# Patient Record
Sex: FEMALE | Race: WHITE | NOT HISPANIC OR LATINO | Employment: UNEMPLOYED | URBAN - NONMETROPOLITAN AREA
[De-identification: names, ages, dates, MRNs, and addresses within clinical notes are randomized per-mention and may not be internally consistent; named-entity substitution may affect disease eponyms.]

---
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
## 2019-07-19 ENCOUNTER — APPOINTMENT (OUTPATIENT)
Dept: CT IMAGING | Facility: HOSPITAL | Age: 19
End: 2019-07-19

## 2019-07-19 ENCOUNTER — HOSPITAL ENCOUNTER (EMERGENCY)
Facility: HOSPITAL | Age: 19
Discharge: HOME | End: 2019-07-19
Admitting: EMERGENCY MEDICINE

## 2019-07-19 VITALS
HEART RATE: 88 BPM | BODY MASS INDEX: 29.44 KG/M2 | OXYGEN SATURATION: 98 % | RESPIRATION RATE: 16 BRPM | SYSTOLIC BLOOD PRESSURE: 106 MMHG | WEIGHT: 160 LBS | DIASTOLIC BLOOD PRESSURE: 64 MMHG | HEIGHT: 62 IN | TEMPERATURE: 99 F

## 2019-07-19 DIAGNOSIS — R10.30 LOWER ABDOMINAL PAIN: Primary | ICD-10-CM

## 2019-07-19 DIAGNOSIS — N30.00 ACUTE CYSTITIS WITHOUT HEMATURIA: ICD-10-CM

## 2019-07-19 LAB
ALBUMIN SERPL-MCNC: 4 G/DL (ref 3.5–5)
ALBUMIN/GLOB SERPL: 1.3 G/DL (ref 1.1–2.5)
ALP SERPL-CCNC: 111 U/L (ref 24–120)
ALT SERPL W P-5'-P-CCNC: 26 U/L (ref 0–54)
ANION GAP SERPL CALCULATED.3IONS-SCNC: 6 MMOL/L (ref 4–13)
AST SERPL-CCNC: 27 U/L (ref 7–45)
B-HCG UR QL: NEGATIVE
BACTERIA UR QL AUTO: ABNORMAL /HPF
BASOPHILS # BLD AUTO: 0.04 10*3/MM3 (ref 0–0.2)
BASOPHILS NFR BLD AUTO: 0.4 % (ref 0–2)
BILIRUB SERPL-MCNC: 0.2 MG/DL (ref 0.1–1)
BILIRUB UR QL STRIP: NEGATIVE
BUN BLD-MCNC: 12 MG/DL (ref 5–21)
BUN/CREAT SERPL: 16.4 (ref 7–25)
CALCIUM SPEC-SCNC: 9.4 MG/DL (ref 8.4–10.4)
CHLORIDE SERPL-SCNC: 106 MMOL/L (ref 98–110)
CLARITY UR: ABNORMAL
CO2 SERPL-SCNC: 27 MMOL/L (ref 24–31)
COLOR UR: YELLOW
CREAT BLD-MCNC: 0.73 MG/DL (ref 0.5–1.4)
DEPRECATED RDW RBC AUTO: 43.4 FL (ref 40–54)
EOSINOPHIL # BLD AUTO: 0.04 10*3/MM3 (ref 0–0.7)
EOSINOPHIL NFR BLD AUTO: 0.4 % (ref 0–4)
ERYTHROCYTE [DISTWIDTH] IN BLOOD BY AUTOMATED COUNT: 14 % (ref 12–15)
GFR SERPL CREATININE-BSD FRML MDRD: 102 ML/MIN/1.73
GLOBULIN UR ELPH-MCNC: 3.1 GM/DL
GLUCOSE BLD-MCNC: 97 MG/DL (ref 70–100)
GLUCOSE UR STRIP-MCNC: ABNORMAL MG/DL
HCT VFR BLD AUTO: 38.6 % (ref 37–47)
HGB BLD-MCNC: 12.5 G/DL (ref 12–16)
HGB UR QL STRIP.AUTO: NEGATIVE
HYALINE CASTS UR QL AUTO: ABNORMAL /LPF
IMM GRANULOCYTES # BLD AUTO: 0.03 10*3/MM3 (ref 0–0.05)
IMM GRANULOCYTES NFR BLD AUTO: 0.3 % (ref 0–5)
INTERNAL NEGATIVE CONTROL: NEGATIVE
INTERNAL POSITIVE CONTROL: POSITIVE
KETONES UR QL STRIP: NEGATIVE
LEUKOCYTE ESTERASE UR QL STRIP.AUTO: ABNORMAL
LYMPHOCYTES # BLD AUTO: 2.73 10*3/MM3 (ref 0.72–4.86)
LYMPHOCYTES NFR BLD AUTO: 24.4 % (ref 15–45)
Lab: NORMAL
MCH RBC QN AUTO: 27.4 PG (ref 28–32)
MCHC RBC AUTO-ENTMCNC: 32.4 G/DL (ref 33–36)
MCV RBC AUTO: 84.6 FL (ref 82–98)
MONOCYTES # BLD AUTO: 0.59 10*3/MM3 (ref 0.19–1.3)
MONOCYTES NFR BLD AUTO: 5.3 % (ref 4–12)
NEUTROPHILS # BLD AUTO: 7.74 10*3/MM3 (ref 1.87–8.4)
NEUTROPHILS NFR BLD AUTO: 69.2 % (ref 39–78)
NITRITE UR QL STRIP: NEGATIVE
NRBC BLD AUTO-RTO: 0 /100 WBC (ref 0–0.2)
PH UR STRIP.AUTO: <=5 [PH] (ref 5–8)
PLATELET # BLD AUTO: 307 10*3/MM3 (ref 130–400)
PMV BLD AUTO: 11.6 FL (ref 6–12)
POTASSIUM BLD-SCNC: 4.3 MMOL/L (ref 3.5–5.3)
PROT SERPL-MCNC: 7.1 G/DL (ref 6.3–8.7)
PROT UR QL STRIP: NEGATIVE
RBC # BLD AUTO: 4.56 10*6/MM3 (ref 4.2–5.4)
RBC # UR: ABNORMAL /HPF
REF LAB TEST METHOD: ABNORMAL
SODIUM BLD-SCNC: 139 MMOL/L (ref 135–145)
SP GR UR STRIP: >1.03 (ref 1–1.03)
SQUAMOUS #/AREA URNS HPF: ABNORMAL /HPF
UROBILINOGEN UR QL STRIP: ABNORMAL
WBC NRBC COR # BLD: 11.17 10*3/MM3 (ref 4.8–10.8)
WBC UR QL AUTO: ABNORMAL /HPF

## 2019-07-19 PROCEDURE — 85025 COMPLETE CBC W/AUTO DIFF WBC: CPT | Performed by: NURSE PRACTITIONER

## 2019-07-19 PROCEDURE — 80053 COMPREHEN METABOLIC PANEL: CPT | Performed by: NURSE PRACTITIONER

## 2019-07-19 PROCEDURE — 81001 URINALYSIS AUTO W/SCOPE: CPT | Performed by: NURSE PRACTITIONER

## 2019-07-19 PROCEDURE — 81025 URINE PREGNANCY TEST: CPT | Performed by: NURSE PRACTITIONER

## 2019-07-19 PROCEDURE — 74177 CT ABD & PELVIS W/CONTRAST: CPT

## 2019-07-19 PROCEDURE — 25010000002 ONDANSETRON PER 1 MG: Performed by: NURSE PRACTITIONER

## 2019-07-19 PROCEDURE — 87086 URINE CULTURE/COLONY COUNT: CPT | Performed by: NURSE PRACTITIONER

## 2019-07-19 PROCEDURE — 99283 EMERGENCY DEPT VISIT LOW MDM: CPT

## 2019-07-19 PROCEDURE — 96374 THER/PROPH/DIAG INJ IV PUSH: CPT

## 2019-07-19 PROCEDURE — 25010000002 IOPAMIDOL 61 % SOLUTION: Performed by: NURSE PRACTITIONER

## 2019-07-19 RX ORDER — NITROFURANTOIN 25; 75 MG/1; MG/1
100 CAPSULE ORAL 2 TIMES DAILY
Qty: 10 CAPSULE | Refills: 0 | Status: SHIPPED | OUTPATIENT
Start: 2019-07-19 | End: 2019-07-24

## 2019-07-19 RX ORDER — ONDANSETRON 2 MG/ML
4 INJECTION INTRAMUSCULAR; INTRAVENOUS ONCE
Status: COMPLETED | OUTPATIENT
Start: 2019-07-19 | End: 2019-07-19

## 2019-07-19 RX ADMIN — IOPAMIDOL 92 ML: 612 INJECTION, SOLUTION INTRAVENOUS at 19:05

## 2019-07-19 RX ADMIN — ONDANSETRON HYDROCHLORIDE 4 MG: 2 SOLUTION INTRAMUSCULAR; INTRAVENOUS at 17:40

## 2019-07-19 RX ADMIN — SODIUM CHLORIDE 1000 ML: 9 INJECTION, SOLUTION INTRAVENOUS at 17:15

## 2019-07-21 LAB — BACTERIA SPEC AEROBE CULT: ABNORMAL

## 2020-08-30 ENCOUNTER — APPOINTMENT (OUTPATIENT)
Dept: CT IMAGING | Facility: HOSPITAL | Age: 20
End: 2020-08-30

## 2020-08-30 ENCOUNTER — HOSPITAL ENCOUNTER (EMERGENCY)
Facility: HOSPITAL | Age: 20
Discharge: HOME | End: 2020-08-30
Admitting: EMERGENCY MEDICINE

## 2020-08-30 VITALS
RESPIRATION RATE: 15 BRPM | DIASTOLIC BLOOD PRESSURE: 66 MMHG | WEIGHT: 172 LBS | HEIGHT: 64 IN | OXYGEN SATURATION: 97 % | TEMPERATURE: 98.9 F | HEART RATE: 95 BPM | SYSTOLIC BLOOD PRESSURE: 109 MMHG | BODY MASS INDEX: 29.37 KG/M2

## 2020-08-30 DIAGNOSIS — J02.9 PHARYNGITIS, UNSPECIFIED ETIOLOGY: Primary | ICD-10-CM

## 2020-08-30 LAB
ALBUMIN SERPL-MCNC: 4 G/DL (ref 3.5–5.2)
ALBUMIN/GLOB SERPL: 1.2 G/DL
ALP SERPL-CCNC: 89 U/L (ref 39–117)
ALT SERPL W P-5'-P-CCNC: 18 U/L (ref 1–33)
ANION GAP SERPL CALCULATED.3IONS-SCNC: 13 MMOL/L (ref 5–15)
AST SERPL-CCNC: 15 U/L (ref 1–32)
B-HCG UR QL: NEGATIVE
BACTERIA UR QL AUTO: ABNORMAL /HPF
BASOPHILS # BLD AUTO: 0.03 10*3/MM3 (ref 0–0.2)
BASOPHILS NFR BLD AUTO: 0.3 % (ref 0–1.5)
BILIRUB SERPL-MCNC: 0.2 MG/DL (ref 0–1.2)
BILIRUB UR QL STRIP: NEGATIVE
BUN SERPL-MCNC: 11 MG/DL (ref 6–20)
BUN/CREAT SERPL: 16.4 (ref 7–25)
CALCIUM SPEC-SCNC: 9.3 MG/DL (ref 8.6–10.5)
CHLORIDE SERPL-SCNC: 101 MMOL/L (ref 98–107)
CLARITY UR: CLEAR
CO2 SERPL-SCNC: 23 MMOL/L (ref 22–29)
COLOR UR: YELLOW
CREAT SERPL-MCNC: 0.67 MG/DL (ref 0.57–1)
D-LACTATE SERPL-SCNC: 1.1 MMOL/L (ref 0.5–2)
DEPRECATED RDW RBC AUTO: 43.9 FL (ref 37–54)
EOSINOPHIL # BLD AUTO: 0 10*3/MM3 (ref 0–0.4)
EOSINOPHIL NFR BLD AUTO: 0 % (ref 0.3–6.2)
ERYTHROCYTE [DISTWIDTH] IN BLOOD BY AUTOMATED COUNT: 14.2 % (ref 12.3–15.4)
GFR SERPL CREATININE-BSD FRML MDRD: 111 ML/MIN/1.73
GLOBULIN UR ELPH-MCNC: 3.3 GM/DL
GLUCOSE SERPL-MCNC: 96 MG/DL (ref 65–99)
GLUCOSE UR STRIP-MCNC: NEGATIVE MG/DL
HCT VFR BLD AUTO: 40.6 % (ref 34–46.6)
HGB BLD-MCNC: 13.1 G/DL (ref 12–15.9)
HGB UR QL STRIP.AUTO: NEGATIVE
HOLD SPECIMEN: NORMAL
IMM GRANULOCYTES # BLD AUTO: 0.02 10*3/MM3 (ref 0–0.05)
IMM GRANULOCYTES NFR BLD AUTO: 0.2 % (ref 0–0.5)
INTERNAL NEGATIVE CONTROL: NEGATIVE
INTERNAL POSITIVE CONTROL: POSITIVE
KETONES UR QL STRIP: NEGATIVE
LEUKOCYTE ESTERASE UR QL STRIP.AUTO: ABNORMAL
LYMPHOCYTES # BLD AUTO: 1.73 10*3/MM3 (ref 0.7–3.1)
LYMPHOCYTES NFR BLD AUTO: 19.6 % (ref 19.6–45.3)
Lab: NORMAL
MCH RBC QN AUTO: 27.3 PG (ref 26.6–33)
MCHC RBC AUTO-ENTMCNC: 32.3 G/DL (ref 31.5–35.7)
MCV RBC AUTO: 84.6 FL (ref 79–97)
MONOCYTES # BLD AUTO: 0.53 10*3/MM3 (ref 0.1–0.9)
MONOCYTES NFR BLD AUTO: 6 % (ref 5–12)
NEUTROPHILS NFR BLD AUTO: 6.51 10*3/MM3 (ref 1.7–7)
NEUTROPHILS NFR BLD AUTO: 73.9 % (ref 42.7–76)
NITRITE UR QL STRIP: NEGATIVE
NRBC BLD AUTO-RTO: 0 /100 WBC (ref 0–0.2)
PH UR STRIP.AUTO: 6.5 [PH] (ref 5–8)
PLATELET # BLD AUTO: 298 10*3/MM3 (ref 140–450)
PMV BLD AUTO: 11.5 FL (ref 6–12)
POTASSIUM SERPL-SCNC: 4.4 MMOL/L (ref 3.5–5.2)
PROT SERPL-MCNC: 7.3 G/DL (ref 6–8.5)
PROT UR QL STRIP: NEGATIVE
RBC # BLD AUTO: 4.8 10*6/MM3 (ref 3.77–5.28)
RBC # UR: ABNORMAL /HPF
REF LAB TEST METHOD: ABNORMAL
S PYO AG THROAT QL: NEGATIVE
SODIUM SERPL-SCNC: 137 MMOL/L (ref 136–145)
SP GR UR STRIP: 1.02 (ref 1–1.03)
SQUAMOUS #/AREA URNS HPF: ABNORMAL /HPF
UROBILINOGEN UR QL STRIP: ABNORMAL
WBC # BLD AUTO: 8.82 10*3/MM3 (ref 3.4–10.8)
WBC UR QL AUTO: ABNORMAL /HPF
YEAST URNS QL MICRO: ABNORMAL /HPF

## 2020-08-30 PROCEDURE — 81001 URINALYSIS AUTO W/SCOPE: CPT | Performed by: NURSE PRACTITIONER

## 2020-08-30 PROCEDURE — 99284 EMERGENCY DEPT VISIT MOD MDM: CPT

## 2020-08-30 PROCEDURE — 87081 CULTURE SCREEN ONLY: CPT | Performed by: NURSE PRACTITIONER

## 2020-08-30 PROCEDURE — 87040 BLOOD CULTURE FOR BACTERIA: CPT | Performed by: NURSE PRACTITIONER

## 2020-08-30 PROCEDURE — 99283 EMERGENCY DEPT VISIT LOW MDM: CPT

## 2020-08-30 PROCEDURE — 81025 URINE PREGNANCY TEST: CPT | Performed by: NURSE PRACTITIONER

## 2020-08-30 PROCEDURE — 83605 ASSAY OF LACTIC ACID: CPT | Performed by: NURSE PRACTITIONER

## 2020-08-30 PROCEDURE — 70491 CT SOFT TISSUE NECK W/DYE: CPT

## 2020-08-30 PROCEDURE — 87880 STREP A ASSAY W/OPTIC: CPT | Performed by: NURSE PRACTITIONER

## 2020-08-30 PROCEDURE — 25010000002 DEXAMETHASONE PER 1 MG: Performed by: NURSE PRACTITIONER

## 2020-08-30 PROCEDURE — 25010000002 IOPAMIDOL 61 % SOLUTION: Performed by: NURSE PRACTITIONER

## 2020-08-30 PROCEDURE — 96365 THER/PROPH/DIAG IV INF INIT: CPT

## 2020-08-30 PROCEDURE — 87086 URINE CULTURE/COLONY COUNT: CPT | Performed by: NURSE PRACTITIONER

## 2020-08-30 PROCEDURE — 85025 COMPLETE CBC W/AUTO DIFF WBC: CPT | Performed by: NURSE PRACTITIONER

## 2020-08-30 PROCEDURE — 80053 COMPREHEN METABOLIC PANEL: CPT | Performed by: NURSE PRACTITIONER

## 2020-08-30 PROCEDURE — 96375 TX/PRO/DX INJ NEW DRUG ADDON: CPT

## 2020-08-30 RX ORDER — CLINDAMYCIN HYDROCHLORIDE 300 MG/1
300 CAPSULE ORAL 3 TIMES DAILY
Qty: 30 CAPSULE | Refills: 0 | Status: SHIPPED | OUTPATIENT
Start: 2020-08-30 | End: 2020-09-09

## 2020-08-30 RX ORDER — SODIUM CHLORIDE 0.9 % (FLUSH) 0.9 %
10 SYRINGE (ML) INJECTION AS NEEDED
Status: DISCONTINUED | OUTPATIENT
Start: 2020-08-30 | End: 2020-08-30 | Stop reason: HOSPADM

## 2020-08-30 RX ORDER — METHYLPREDNISOLONE 4 MG/1
TABLET ORAL
Qty: 1 EACH | Refills: 0 | Status: SHIPPED | OUTPATIENT
Start: 2020-08-30

## 2020-08-30 RX ORDER — CLINDAMYCIN PHOSPHATE 900 MG/50ML
900 INJECTION INTRAVENOUS ONCE
Status: COMPLETED | OUTPATIENT
Start: 2020-08-30 | End: 2020-08-30

## 2020-08-30 RX ORDER — DEXAMETHASONE SODIUM PHOSPHATE 10 MG/ML
10 INJECTION INTRAMUSCULAR; INTRAVENOUS ONCE
Status: COMPLETED | OUTPATIENT
Start: 2020-08-30 | End: 2020-08-30

## 2020-08-30 RX ORDER — ONDANSETRON 4 MG/1
4 TABLET, ORALLY DISINTEGRATING ORAL EVERY 6 HOURS PRN
Qty: 8 TABLET | Refills: 0 | Status: SHIPPED | OUTPATIENT
Start: 2020-08-30 | End: 2020-09-01

## 2020-08-30 RX ADMIN — SODIUM CHLORIDE 1000 ML: 9 INJECTION, SOLUTION INTRAVENOUS at 12:03

## 2020-08-30 RX ADMIN — CLINDAMYCIN IN 5 PERCENT DEXTROSE 900 MG: 18 INJECTION, SOLUTION INTRAVENOUS at 12:03

## 2020-08-30 RX ADMIN — DEXAMETHASONE SODIUM PHOSPHATE 10 MG: 10 INJECTION INTRAMUSCULAR; INTRAVENOUS at 12:03

## 2020-08-30 RX ADMIN — IOPAMIDOL 100 ML: 612 INJECTION, SOLUTION INTRAVENOUS at 13:24

## 2020-08-31 LAB — BACTERIA SPEC AEROBE CULT: NORMAL

## 2020-09-01 LAB — BACTERIA SPEC AEROBE CULT: NORMAL

## 2020-09-04 LAB
BACTERIA SPEC AEROBE CULT: NORMAL
BACTERIA SPEC AEROBE CULT: NORMAL

## 2020-09-23 ENCOUNTER — OFFICE VISIT (OUTPATIENT)
Dept: OTOLARYNGOLOGY | Facility: CLINIC | Age: 20
End: 2020-09-23

## 2020-09-23 VITALS
HEART RATE: 100 BPM | BODY MASS INDEX: 30.04 KG/M2 | WEIGHT: 175 LBS | SYSTOLIC BLOOD PRESSURE: 114 MMHG | TEMPERATURE: 97.9 F | DIASTOLIC BLOOD PRESSURE: 68 MMHG

## 2020-09-23 DIAGNOSIS — Z78.9 ANTIBIOTIC DRUG INTOLERANCE: ICD-10-CM

## 2020-09-23 DIAGNOSIS — J35.01 CHRONIC TONSILLITIS: Primary | ICD-10-CM

## 2020-09-23 DIAGNOSIS — J35.1 TONSILLAR HYPERTROPHY: ICD-10-CM

## 2020-09-23 PROCEDURE — 99203 OFFICE O/P NEW LOW 30 MIN: CPT | Performed by: NURSE PRACTITIONER

## 2020-09-23 NOTE — PROGRESS NOTES
PRIMARY CARE PROVIDER: Juanita Dejesus APRN  REFERRING PROVIDER: No ref. provider found    Chief Complaint   Patient presents with   • Sore Throat     frequent tonsillitis       Subjective   History of Present Illness:  Sisi Gale is a  19 y.o. female who complains of sore throats and frequent tonsillitis. The symptoms are localized to the throat. The patient has had severe symptoms. The symptoms have been recurrent in nature, occurring 4 times a year for the last 4-5 years. There have been no identified factors that aggravate the symptoms.  The patient reports the episodes of tonsillitis are significantly worsening.  Her last episode lasted for approximately 3 to 4 weeks.  This resulted in 2 visits to the ER.  She was treated with IV antibiotics and oral antibiotics.  She does report difficulty tolerating antibiotics due to GI upset and yeast infections.    Review of Systems:  Review of Systems   Constitutional: Negative for chills and fever.   HENT: Negative for congestion, ear discharge, ear pain, rhinorrhea and sore throat.    Respiratory: Negative for cough and shortness of breath.    Cardiovascular: Negative for chest pain.   Gastrointestinal: Negative for diarrhea, nausea and vomiting.       Past History:  History reviewed. No pertinent past medical history.  Past Surgical History:   Procedure Laterality Date   • WISDOM TOOTH EXTRACTION       Family History   Problem Relation Age of Onset   • No Known Problems Father    • No Known Problems Mother      Social History     Tobacco Use   • Smoking status: Never Smoker   • Smokeless tobacco: Never Used   Substance Use Topics   • Alcohol use: No   • Drug use: Not on file     Allergies:  Bactrim [sulfamethoxazole-trimethoprim], Biaxin [clarithromycin], Cephalosporins, and Sulfa antibiotics    Current Outpatient Medications:   •  APRI 0.15-30 MG-MCG per tablet, TAKE 1 TABLET BY MOUTH EVERY DAY, Disp: , Rfl: 5  •  sertraline (ZOLOFT) 50 MG tablet, TAKE 1 TABLET  EVERY DAY, Disp: , Rfl: 3  •  HYDROcodone-acetaminophen (NORCO)  MG per tablet, TAKE 1 TABLET BY MOUTH 3 TIMES A DAY AS NEEDED FOR PAIN, Disp: , Rfl: 0  •  methylPREDNISolone (MEDROL) 4 MG dose pack, Take as directed on package instructions., Disp: 1 each, Rfl: 0  •  tamsulosin (FLOMAX) 0.4 MG capsule 24 hr capsule, Take 1 capsule by mouth Every Night., Disp: 14 capsule, Rfl: 0      Objective     Vital Signs:  Temp:  [97.9 °F (36.6 °C)] 97.9 °F (36.6 °C)  Heart Rate:  [100] 100  BP: (114)/(68) 114/68    Physical Exam:  Physical Exam  CONSTITUTIONAL: well nourished, well-developed, alert, oriented, in no acute distress   COMMUNICATION AND VOICE: able to communicate normally, normal voice quality  HEAD: normocephalic, no lesions, atraumatic, no tenderness, no masses   FACE: appearance normal, no lesions, no tenderness, no deformities, facial motion symmetric  SALIVARY GLANDS: parotid glands with no tenderness, no swelling, no masses, submandibular glands with normal size, nontender  EYES: ocular motility normal, eyelids normal, orbits normal, no proptosis, conjunctiva normal , pupils equal, round   EARS:  Hearing: response to conversational voice normal bilaterally   External Ears: auricles without lesions  Otoscopic: tympanic membrane appearance normal, no lesions, no perforation, normal mobility, no fluid  NOSE:  External Nose: structure normal, no tenderness on palpation, no nasal discharge, no lesions, no evidence of trauma, nostrils patent   Intranasal Exam: nasal mucosa normal, vestibule within normal limits, inferior turbinate normal, nasal septum midline   ORAL:  Lips: upper and lower lips without lesion   Teeth: dentition within normal limits for age   Gums: gingivae healthy   Oral Mucosa: oral mucosa normal, no mucosal lesions   Floor of Mouth: Warthin’s duct patent, mucosa normal  Tongue: lingual mucosa normal without lesions, normal tongue mobility   Palate: soft and hard palates with normal mucosa  and structure  Oropharynx: oropharyngeal mucosa normal, tonsils 2+ in size bilaterally and cryptic in appearance  NECK: neck appearance normal, no masses or tenderness  LYMPH NODES: no lymphadenopathy  CHEST/RESPIRATORY: respiratory effort normal, normal breath sounds   CARDIOVASCULAR: rate and rhythm normal, extremities without cyanosis or edema    NEUROLOGIC/PSYCHIATRIC: oriented to time, place and person, mood normal, affect appropriate, CN II-XII intact grossly      Assessment   Assessment:  1. Chronic tonsillitis    2. Tonsillar hypertrophy    3. Antibiotic drug intolerance        Plan   Plan:     Medical and surgical options were discussed including observation versus surgical management. Risks, benefits and alternatives were discussed and questions were answered. A tonsillectomy and adenoidectomy was felt to be indicated due to the patient's history of a history of adenotonsillitis with complicating antibiotic resistance or complications and chronic tonsillitis. After considering the options, it was decided that tonsillectomy and adenoidectomy was the best option.    INFORMED CONSENT DISCUSSION:  TONSILLECTOMY AND ADENOIDECTOMY: A tonsillectomy and adenoidectomy were recommended. The risks and benefits were explained including but not limited to early and late bleeding, infection, risks of the general anesthesia, dysphagia and poor PO intake, and voice change/VPI.  Alternatives were discussed. Understanding of the risks was demonstrated. Questions were asked appropriately answered.      PREOPERATIVE WORKUP:   Per anesthesia    Return for follow up postoperatively.     Yamile Rodriguez, APRN  09/23/20  16:52 CDT

## 2020-09-24 PROBLEM — J35.01 CHRONIC TONSILLITIS: Status: ACTIVE | Noted: 2020-09-24

## 2020-09-24 PROBLEM — Z78.9 ANTIBIOTIC DRUG INTOLERANCE: Status: ACTIVE | Noted: 2020-09-24

## 2020-11-23 ENCOUNTER — TELEPHONE (OUTPATIENT)
Dept: OTOLARYNGOLOGY | Facility: CLINIC | Age: 20
End: 2020-11-23

## 2020-11-24 ENCOUNTER — TRANSCRIBE ORDERS (OUTPATIENT)
Dept: ADMINISTRATIVE | Facility: HOSPITAL | Age: 20
End: 2020-11-24

## 2020-11-24 DIAGNOSIS — Z11.59 SCREENING FOR VIRAL DISEASE: Primary | ICD-10-CM

## 2021-12-13 ENCOUNTER — OFFICE VISIT (OUTPATIENT)
Dept: FAMILY MEDICINE CLINIC | Age: 21
End: 2021-12-13
Payer: COMMERCIAL

## 2021-12-13 ENCOUNTER — HOSPITAL ENCOUNTER (OUTPATIENT)
Dept: GENERAL RADIOLOGY | Age: 21
Discharge: HOME | End: 2021-12-13
Payer: COMMERCIAL

## 2021-12-13 VITALS
TEMPERATURE: 97.3 F | OXYGEN SATURATION: 97 % | SYSTOLIC BLOOD PRESSURE: 92 MMHG | WEIGHT: 173.6 LBS | RESPIRATION RATE: 16 BRPM | HEART RATE: 99 BPM | HEIGHT: 63 IN | BODY MASS INDEX: 30.76 KG/M2 | DIASTOLIC BLOOD PRESSURE: 60 MMHG

## 2021-12-13 DIAGNOSIS — M54.2 NECK PAIN: ICD-10-CM

## 2021-12-13 DIAGNOSIS — G44.039 NONINTRACTABLE PAROXYSMAL HEMICRANIA, UNSPECIFIED CHRONICITY PATTERN: ICD-10-CM

## 2021-12-13 DIAGNOSIS — K21.9 GASTROESOPHAGEAL REFLUX DISEASE WITHOUT ESOPHAGITIS: ICD-10-CM

## 2021-12-13 DIAGNOSIS — R35.0 URINARY FREQUENCY: ICD-10-CM

## 2021-12-13 LAB — URINE TYPE: NORMAL

## 2021-12-13 PROCEDURE — 99204 OFFICE O/P NEW MOD 45 MIN: CPT | Performed by: FAMILY MEDICINE

## 2021-12-13 PROCEDURE — 72040 X-RAY EXAM NECK SPINE 2-3 VW: CPT

## 2021-12-13 RX ORDER — SERTRALINE HYDROCHLORIDE 100 MG/1
100 TABLET, FILM COATED ORAL DAILY
COMMUNITY
End: 2022-01-04 | Stop reason: SDUPTHER

## 2021-12-13 RX ORDER — BUSPIRONE HYDROCHLORIDE 10 MG/1
10 TABLET ORAL 2 TIMES DAILY
COMMUNITY
End: 2022-10-18

## 2021-12-13 SDOH — ECONOMIC STABILITY: FOOD INSECURITY: WITHIN THE PAST 12 MONTHS, THE FOOD YOU BOUGHT JUST DIDN'T LAST AND YOU DIDN'T HAVE MONEY TO GET MORE.: NEVER TRUE

## 2021-12-13 SDOH — ECONOMIC STABILITY: FOOD INSECURITY: WITHIN THE PAST 12 MONTHS, YOU WORRIED THAT YOUR FOOD WOULD RUN OUT BEFORE YOU GOT MONEY TO BUY MORE.: NEVER TRUE

## 2021-12-13 ASSESSMENT — PATIENT HEALTH QUESTIONNAIRE - PHQ9
SUM OF ALL RESPONSES TO PHQ QUESTIONS 1-9: 0
2. FEELING DOWN, DEPRESSED OR HOPELESS: 0
SUM OF ALL RESPONSES TO PHQ9 QUESTIONS 1 & 2: 0
1. LITTLE INTEREST OR PLEASURE IN DOING THINGS: 0
SUM OF ALL RESPONSES TO PHQ QUESTIONS 1-9: 0
SUM OF ALL RESPONSES TO PHQ QUESTIONS 1-9: 0

## 2021-12-13 ASSESSMENT — ENCOUNTER SYMPTOMS
BLOOD IN STOOL: 0
SHORTNESS OF BREATH: 0
VOMITING: 0
EYES NEGATIVE: 1
NAUSEA: 0
WHEEZING: 0
CONSTIPATION: 0
COUGH: 0
DIARRHEA: 0
CHEST TIGHTNESS: 0

## 2021-12-13 ASSESSMENT — SOCIAL DETERMINANTS OF HEALTH (SDOH): HOW HARD IS IT FOR YOU TO PAY FOR THE VERY BASICS LIKE FOOD, HOUSING, MEDICAL CARE, AND HEATING?: NOT HARD AT ALL

## 2021-12-13 NOTE — PROGRESS NOTES
Subjective:      Patient ID: Tania Luo is a 19 y.o. female. HPI  This patient is being seen here by the undersigned for the first time. He previously, she has gone to the health department for issues and also seen Ms Judge Cem adames with [facility].  She is the daughter of another patient of ours, Giuseppe Melgar, that we have been seeing for quite some time. She tells me today that about a month ago she had gone to the health department to have refills on her oral contraceptive medication. She complained there of some increased frequency. Urinalysis was done but she was told that the sampling was negative. No medications were prescribed. Apparently sometime later a culture and sensitivity was done and she was told that she had a \"slight UTI. \"  Once again, the patient had no medication prescribed. She tells me today that last Monday her urine was rechecked and still had no bacteria so she was told once again that she did not need medication. The patient is a teacher at middle school. She did tell me that she did have to leave school 1 day last week when she had severe lower abdominal pain. This was described in the suprapubic area. The patient went to the \" [facility]\" clinic in [place] and was told that she had a diagnosis of urinary tract infection. She was prescribed cefdinir. She is still having some problems at this time. Urinalysis today shows specific gravity greater than 1.030. Leukocytes, nitrite, and blood were all negative. I told the patient that it is imperative that she increase her water intake and I suggested that she drink 6 ounces of cranberry juice on a daily basis. She did voice understanding. Additionally, I am going to order a renal ultrasound on her to make sure that she is not having some issues such as reflux. I would suggest that she continue the course of the cefdinir that was prescribed at the urgent care clinic in [place].   She previously was told that the hospital by the nurse practitioner that they were going to get her an appointment with urology but she has yet not been notified of such. Patient also complains today of some neck pain. She has some tightness in her neck on the right side. She has seen chiropractor on a fairly regular basis. She tells me that she has had ongoing issues with her neck for some time. She has seen provider at the [address] [facility] and tells me that she did have MRI done in December 2020. She tells me that she did have a report of bulging disc to the right of midline. She was given a trial of muscle relaxers that did not help except make her drowsy. She complains of fairly constant headache that starts in the occiput and then radiates cephaladly to retrobulbar area, only on the right side. It certainly seems quite possible that she may have a bulging disc with impingement to the right of midline. She has been utilizing Excedrin Migraine and this has helped some but not total relief. I am going to give her a trial of some Fioricet while we perform some additional testing. We will do a plain C-spine with obliques. She may actually benefit from an NSAID at some point in time along with a trial of perhaps another muscle relaxer. She does have a history of depression and anxiety. She is on Zoloft 100 and takes this at once daily. BuSpar 10 mg is taken at twice daily for anxiety as well. For issues with GERD, she does take omeprazole 20 mg p.o. daily on an as-needed basis. Review of Systems   Constitutional: Negative. HENT: Negative. Eyes: Negative. Respiratory: Negative for cough, chest tightness, shortness of breath and wheezing. Cardiovascular: Negative for chest pain, palpitations and leg swelling. Gastrointestinal: Negative for blood in stool, constipation, diarrhea, nausea and vomiting. Genitourinary: Positive for frequency. Negative for hematuria.    Musculoskeletal: Positive for neck pain. Skin: Negative. Neurological: Positive for headaches. Psychiatric/Behavioral: Negative. Objective:   Physical Exam  Vitals and nursing note reviewed. Constitutional:       General: She is not in acute distress. Appearance: Normal appearance. She is well-developed and well-groomed. She is not ill-appearing, toxic-appearing or diaphoretic. HENT:      Head: Normocephalic and atraumatic. Right Ear: Tympanic membrane, ear canal and external ear normal. There is no impacted cerumen. Left Ear: Tympanic membrane, ear canal and external ear normal. There is no impacted cerumen. Nose: Nose normal.      Mouth/Throat:      Lips: Pink. Mouth: Mucous membranes are moist.      Dentition: Normal dentition. Pharynx: Oropharynx is clear. Uvula midline. Eyes:      General: Lids are normal. No scleral icterus. Right eye: No discharge. Left eye: No discharge. Extraocular Movements: Extraocular movements intact. Conjunctiva/sclera: Conjunctivae normal.      Right eye: Right conjunctiva is not injected. Left eye: Left conjunctiva is not injected. Pupils: Pupils are equal, round, and reactive to light. Neck:      Thyroid: No thyromegaly. Vascular: No carotid bruit or JVD. Cardiovascular:      Rate and Rhythm: Normal rate and regular rhythm. Pulses: Normal pulses. Carotid pulses are 2+ on the right side and 2+ on the left side. Radial pulses are 2+ on the right side and 2+ on the left side. Heart sounds: Normal heart sounds, S1 normal and S2 normal. No murmur heard. No friction rub. No gallop. Pulmonary:      Effort: Pulmonary effort is normal. No respiratory distress. Breath sounds: Normal breath sounds. No stridor. No wheezing, rhonchi or rales. Chest:      Chest wall: No tenderness. Breasts:      Right: No supraclavicular adenopathy. Left: No supraclavicular adenopathy. Abdominal:      General: Bowel sounds are normal. There is no distension or abdominal bruit. Palpations: Abdomen is soft. There is no mass. Tenderness: There is no abdominal tenderness. There is no right CVA tenderness, left CVA tenderness, guarding or rebound. Hernia: No hernia is present. Musculoskeletal:         General: No swelling, tenderness, deformity or signs of injury. Normal range of motion. Cervical back: Full passive range of motion without pain, normal range of motion and neck supple. No rigidity or tenderness. Right lower leg: No edema. Left lower leg: No edema. Lymphadenopathy:      Cervical: No cervical adenopathy. Right cervical: No superficial cervical adenopathy. Left cervical: No superficial cervical adenopathy. Upper Body:      Right upper body: No supraclavicular adenopathy. Left upper body: No supraclavicular adenopathy. Skin:     General: Skin is warm and dry. Nails: There is no clubbing. Neurological:      General: No focal deficit present. Mental Status: She is alert and oriented to person, place, and time. Mental status is at baseline. Motor: No weakness or tremor. Coordination: Coordination normal.      Gait: Gait normal.      Deep Tendon Reflexes: Reflexes are normal and symmetric. Psychiatric:         Attention and Perception: Attention normal.         Mood and Affect: Mood normal.         Speech: Speech normal.         Behavior: Behavior normal. Behavior is cooperative. Thought Content: Thought content normal.         Cognition and Memory: Cognition and memory normal.         Judgment: Judgment normal.         BP 92/60 (Site: Left Upper Arm, Position: Sitting, Cuff Size: Large Adult)   Pulse 99   Temp 97.3 °F (36.3 °C) (Infrared)   Resp 16   Ht 5' 3\" (1.6 m)   Wt 173 lb 9.6 oz (78.7 kg)   LMP 11/17/2021   SpO2 97%   BMI 30.75 kg/m²   Assessment:        Diagnosis Orders   1.  Neck pain 2. Urinary frequency  Urinalysis    Urinalysis    US RENAL COMPLETE    No current evidence of infection but specific gravity is quite high at greater than 1.030.   3. Nonintractable paroxysmal hemicrania, unspecified chronicity pattern     4. Gastroesophageal reflux disease without esophagitis             Plan:       I am having Ms. Cherelle Henderson maintain her :   Outpatient Medications Marked as Taking for the 12/13/21 encounter (Office Visit) with Amanda Wen MD   Medication Sig Dispense Refill    sertraline (ZOLOFT) 100 MG tablet Take 100 mg by mouth daily      busPIRone (BUSPAR) 10 MG tablet Take 10 mg by mouth 2 times daily      Norgestim-Eth Estrad Triphasic (TRI-LO-DYLLAN PO) Take 1 each by mouth daily      OMEPRAZOLE PO Take 20 mg by mouth daily     ,Patient states they are no longer utilizing these medication: There are no discontinued medications. I have refilled the following medication today:   Requested Prescriptions     Pending Prescriptions Disp Refills    butalbital-APAP-caffeine (FIORICET) -40 MG CAPS per capsule 90 capsule 0     Sig: Take 1 capsule by mouth every 8 hours as needed for Headaches   .              Amanda Wen MD

## 2021-12-14 RX ORDER — BUTALBITAL, ACETAMINOPHEN AND CAFFEINE 300; 40; 50 MG/1; MG/1; MG/1
1 CAPSULE ORAL EVERY 8 HOURS PRN
Qty: 90 CAPSULE | Refills: 0 | Status: SHIPPED | OUTPATIENT
Start: 2021-12-14 | End: 2022-06-13

## 2021-12-15 ENCOUNTER — HOSPITAL ENCOUNTER (OUTPATIENT)
Dept: GENERAL RADIOLOGY | Age: 21
Discharge: HOME | End: 2021-12-15
Payer: COMMERCIAL

## 2021-12-15 DIAGNOSIS — R35.0 URINARY FREQUENCY: ICD-10-CM

## 2021-12-15 PROCEDURE — 76770 US EXAM ABDO BACK WALL COMP: CPT

## 2021-12-15 RX ORDER — TIZANIDINE 4 MG/1
4 TABLET ORAL EVERY 6 HOURS PRN
COMMUNITY
End: 2022-02-17 | Stop reason: SDUPTHER

## 2021-12-22 ENCOUNTER — TELEPHONE (OUTPATIENT)
Dept: FAMILY MEDICINE CLINIC | Age: 21
End: 2021-12-22

## 2021-12-22 DIAGNOSIS — M54.2 NECK PAIN: Primary | ICD-10-CM

## 2022-01-04 RX ORDER — SERTRALINE HYDROCHLORIDE 100 MG/1
100 TABLET, FILM COATED ORAL DAILY
Qty: 30 TABLET | Refills: 5 | Status: SHIPPED | OUTPATIENT
Start: 2022-01-04 | End: 2022-05-06

## 2022-01-11 ENCOUNTER — OFFICE VISIT (OUTPATIENT)
Dept: PRIMARY CARE CLINIC | Age: 22
End: 2022-01-11

## 2022-01-11 ENCOUNTER — TELEPHONE (OUTPATIENT)
Dept: FAMILY MEDICINE CLINIC | Age: 22
End: 2022-01-11

## 2022-01-11 DIAGNOSIS — Z11.52 ENCOUNTER FOR SCREENING FOR COVID-19: Primary | ICD-10-CM

## 2022-01-11 DIAGNOSIS — M50.90 CERVICAL DISC DISORDER: Primary | ICD-10-CM

## 2022-01-11 PROCEDURE — 99999 PR OFFICE/OUTPT VISIT,PROCEDURE ONLY: CPT | Performed by: NURSE PRACTITIONER

## 2022-01-11 RX ORDER — BENZONATATE 200 MG/1
200 CAPSULE ORAL 3 TIMES DAILY PRN
Qty: 21 CAPSULE | Refills: 0 | Status: SHIPPED | OUTPATIENT
Start: 2022-01-11 | End: 2022-01-18

## 2022-01-12 LAB — SARS-COV-2, PCR: DETECTED

## 2022-01-20 ENCOUNTER — VIRTUAL VISIT (OUTPATIENT)
Dept: FAMILY MEDICINE CLINIC | Age: 22
End: 2022-01-20
Payer: COMMERCIAL

## 2022-01-20 DIAGNOSIS — G44.039 EPISODIC PAROXYSMAL HEMICRANIA, NOT INTRACTABLE: ICD-10-CM

## 2022-01-20 DIAGNOSIS — R20.0 RIGHT UPPER EXTREMITY NUMBNESS: ICD-10-CM

## 2022-01-20 DIAGNOSIS — M54.2 NECK PAIN: ICD-10-CM

## 2022-01-20 DIAGNOSIS — F41.9 ANXIETY AND DEPRESSION: ICD-10-CM

## 2022-01-20 DIAGNOSIS — M54.12 RADICULITIS, CERVICAL: ICD-10-CM

## 2022-01-20 DIAGNOSIS — F32.A ANXIETY AND DEPRESSION: ICD-10-CM

## 2022-01-20 PROCEDURE — 99214 OFFICE O/P EST MOD 30 MIN: CPT | Performed by: FAMILY MEDICINE

## 2022-01-20 RX ORDER — BUTALBITAL, ACETAMINOPHEN, CAFFEINE AND CODEINE PHOSPHATE 50; 325; 40; 30 MG/1; MG/1; MG/1; MG/1
1 CAPSULE ORAL EVERY 8 HOURS PRN
Qty: 90 CAPSULE | Refills: 0 | Status: SHIPPED | OUTPATIENT
Start: 2022-01-20 | End: 2022-06-06 | Stop reason: SDUPTHER

## 2022-01-20 RX ORDER — GABAPENTIN 300 MG/1
300 CAPSULE ORAL 3 TIMES DAILY
Qty: 90 CAPSULE | Refills: 0 | Status: SHIPPED | OUTPATIENT
Start: 2022-01-20 | End: 2022-05-06

## 2022-01-20 RX ORDER — NAPROXEN 375 MG/1
375 TABLET ORAL 2 TIMES DAILY WITH MEALS
Qty: 60 TABLET | Refills: 5 | Status: SHIPPED | OUTPATIENT
Start: 2022-01-20 | End: 2022-02-07 | Stop reason: ALTCHOICE

## 2022-01-20 ASSESSMENT — ENCOUNTER SYMPTOMS
COUGH: 0
CHEST TIGHTNESS: 0
EYES NEGATIVE: 1
CONSTIPATION: 0
WHEEZING: 0
VOMITING: 0
SHORTNESS OF BREATH: 0
BLOOD IN STOOL: 0
NAUSEA: 0
DIARRHEA: 0

## 2022-01-20 NOTE — PROGRESS NOTES
1/20/2022    TELEHEALTH EVALUATION -- Audio/Visual (During EBRKD-50 public health emergency)  This patient is being consulted today by way of telehealth using [employer]. May video. Telehealth is implemented due to the current pandemic in [place] caused by coronavirus. Previously, the patient has been advised the services billed to her insurance by [facility] ([facility]). HPI:  This patient was seen here as a new patient by the undersigned on 12/13/2021. She was complaining about neck pain and also some headaches. She described that she had some tightness in the neck that was worse on the right side. In addition to having pain in the head and neck area worse to the right, she does have some radiation to her right upper extremity with pain and numbness. She had been seen by chiropractor on a fairly regular basis but it had not alleviated the symptoms to her satisfaction. She had been seen previously at the [facility] in [place] in December 2020 with similar issues. She did have MRI of her neck done. This did show bulging disks reportedly to the right side of the midline. She was given a trial of muscle relaxers at that time and those reportedly did not help except making her drowsy. It was felt that perhaps she had bulging disc with impingement likely to the right. She was sent over to our x-ray department for plain x-rays of the cervical spine. X-rays there did show no acute osseous abnormality. Additionally, we did give her a trial of Fioricet. I am told the Fioricet worked the first pill that she took but has not been helpful thereafter. She on one occasion did use one of her dad's Tylenol 3 and that was helpful so I am changing her regimen to having Fioricet No. 3 available to use it 1 up to 8-hour intervals as needed for severe pain in her head and neck. Patient then did have MRI of the cervical spine done at [facility].  This was done on 12/27/2021.   MRI did show mild narrowing of the capsule Take 1 capsule by mouth every 8 hours as needed for Headaches for up to 30 days.  Yes Michael Antoine MD   sertraline (ZOLOFT) 100 MG tablet Take 1 tablet by mouth daily  Michael Antoine MD   tiZANidine (ZANAFLEX) 4 MG tablet Take 4 mg by mouth every 6 hours as needed  Historical Provider, MD   butalbital-APAP-caffeine (FIORICET) -40 MG CAPS per capsule Take 1 capsule by mouth every 8 hours as needed for Headaches  Michael Antoine MD   busPIRone (BUSPAR) 10 MG tablet Take 10 mg by mouth 2 times daily  Historical Provider, MD   Norgestim-Eth Estrad Triphasic (TRI-LO-DYLLAN PO) Take 1 each by mouth daily  Historical Provider, MD   OMEPRAZOLE PO Take 20 mg by mouth daily  Historical Provider, MD       Social History     Tobacco Use    Smoking status: Never Smoker    Smokeless tobacco: Never Used   Substance Use Topics    Alcohol use: Never    Drug use: Never        Allergies   Allergen Reactions    Biaxin [Clarithromycin] Rash    Sulfa Antibiotics Rash   ,   Past Medical History:   Diagnosis Date    Anxiety    ,   Past Surgical History:   Procedure Laterality Date    WISDOM TOOTH EXTRACTION  2016   ,   Social History     Tobacco Use    Smoking status: Never Smoker    Smokeless tobacco: Never Used   Substance Use Topics    Alcohol use: Never    Drug use: Never   ,   Family History   Problem Relation Age of Onset    Diabetes Father     High Blood Pressure Father    ,   Immunization History   Administered Date(s) Administered    COVID-19, Pfizer Purple top, DILUTE for use, 12+ yrs, 30mcg/0.3mL dose 03/20/2021, 04/15/2021       PHYSICAL EXAMINATION:  [ INSTRUCTIONS:  \"[x]\" Indicates a positive item  \"[]\" Indicates a negative item  -- DELETE ALL ITEMS NOT EXAMINED]  Vital Signs: (As obtained by patient/caregiver or practitioner observation)    Blood pressure-  Heart rate-    Respiratory rate-    Temperature-  Pulse oximetry-     Constitutional: [x] Appears well-developed and well-nourished [x] No apparent distress      [] Abnormal-   Mental status  [x] Alert and awake  [x] Oriented to person/place/time [x]Able to follow commands      Eyes:  EOM    [x]  Normal  [] Abnormal-  Sclera  [x]  Normal  [] Abnormal -         Discharge [x]  None visible  [] Abnormal -    HENT:   [x] Normocephalic, atraumatic. [] Abnormal   [x] Mouth/Throat: Mucous membranes are moist.     External Ears [x] Normal  [] Abnormal-     Neck: [x] No visualized mass     Pulmonary/Chest: [x] Respiratory effort normal.  [x] No visualized signs of difficulty breathing or respiratory distress        [] Abnormal-      Musculoskeletal:   [] Normal gait with no signs of ataxia         [] Normal range of motion of neck        [] Abnormal-       Neurological:        [] No Facial Asymmetry (Cranial nerve 7 motor function) (limited exam to video visit)          [] No gaze palsy        [] Abnormal-         Skin:        [x] No significant exanthematous lesions or discoloration noted on facial skin         [] Abnormal-            Psychiatric:       [x] Normal Affect [x] No Hallucinations        [] Abnormal-     Other pertinent observable physical exam findings-     ASSESSMENT/PLAN:   Diagnosis Orders   1. Episodic paroxysmal hemicrania, not intractable  butalbital-acetaminophen-caffeine-codeine (FIORICET WITH CODEINE) -91-30 MG per capsule    External Referral To Neurosurgery   2. Neck pain  butalbital-acetaminophen-caffeine-codeine (FIORICET WITH CODEINE) -51-30 MG per capsule    External Referral To Neurosurgery   3. Radiculitis, cervical  butalbital-acetaminophen-caffeine-codeine (FIORICET WITH CODEINE) -14-30 MG per capsule    External Referral To Neurosurgery   4. Right upper extremity numbness     5.  Anxiety and depression       I am having Ms. Eva Menezes maintain her :   Outpatient Medications Marked as Taking for the 1/20/22 encounter (Virtual Visit) with Bill Holstein, MD   Medication Sig Dispense Refill    butalbital-acetaminophen-caffeine-codeine (FIORICET WITH CODEINE) -66-30 MG per capsule Take 1 capsule by mouth every 8 hours as needed for Headaches for up to 30 days. 90 capsule 0   ,Patient states they are no longer utilizing these medication: There are no discontinued medications. I have refilled the following medication today:   Requested Prescriptions     Signed Prescriptions Disp Refills    butalbital-acetaminophen-caffeine-codeine (FIORICET WITH CODEINE) -06-30 MG per capsule 90 capsule 0     Sig: Take 1 capsule by mouth every 8 hours as needed for Headaches for up to 30 days. Segundo Natarajan, was evaluated through a synchronous (real-time) audio-video encounter. The patient (or guardian if applicable) is aware that this is a billable service, which includes applicable co-pays. This Virtual Visit was conducted with patient's (and/or legal guardian's) consent. The visit was conducted pursuant to the emergency declaration under the [address] [place] [address] authority and the [employer] General Act. Patient identification was verified, and a caregiver was present when appropriate. The patient was located at home in a state where the provider was licensed to provide care. Total time spent on this encounter: Not billed by time    --Yoseph Olsen MD on 1/20/2022 at 1:57 PM    An electronic signature was used to authenticate this note.

## 2022-01-31 PROCEDURE — 88305 TISSUE EXAM BY PATHOLOGIST: CPT | Performed by: OBSTETRICS & GYNECOLOGY

## 2022-02-01 ENCOUNTER — LAB REQUISITION (OUTPATIENT)
Dept: LAB | Facility: HOSPITAL | Age: 22
End: 2022-02-01

## 2022-02-01 DIAGNOSIS — Z00.00 ENCOUNTER FOR GENERAL ADULT MEDICAL EXAMINATION WITHOUT ABNORMAL FINDINGS: ICD-10-CM

## 2022-02-04 LAB
CYTO UR: NORMAL
LAB AP CASE REPORT: NORMAL
LAB AP CLINICAL INFORMATION: NORMAL
PATH REPORT.FINAL DX SPEC: NORMAL
PATH REPORT.GROSS SPEC: NORMAL

## 2022-02-07 ENCOUNTER — VIRTUAL VISIT (OUTPATIENT)
Dept: FAMILY MEDICINE CLINIC | Age: 22
End: 2022-02-07
Payer: COMMERCIAL

## 2022-02-07 DIAGNOSIS — M54.2 NECK PAIN: ICD-10-CM

## 2022-02-07 DIAGNOSIS — R20.0 RIGHT UPPER EXTREMITY NUMBNESS: ICD-10-CM

## 2022-02-07 DIAGNOSIS — F32.A DEPRESSION, UNSPECIFIED DEPRESSION TYPE: ICD-10-CM

## 2022-02-07 DIAGNOSIS — G62.9 NEUROPATHY: ICD-10-CM

## 2022-02-07 PROCEDURE — 99214 OFFICE O/P EST MOD 30 MIN: CPT | Performed by: FAMILY MEDICINE

## 2022-02-07 RX ORDER — HYDROCODONE BITARTRATE AND ACETAMINOPHEN 7.5; 325 MG/1; MG/1
1 TABLET ORAL EVERY 6 HOURS PRN
Qty: 12 TABLET | Refills: 0 | Status: SHIPPED | OUTPATIENT
Start: 2022-02-07 | End: 2022-02-17 | Stop reason: SDUPTHER

## 2022-02-07 ASSESSMENT — ENCOUNTER SYMPTOMS
NAUSEA: 0
COUGH: 0
CHEST TIGHTNESS: 0
EYES NEGATIVE: 1
SHORTNESS OF BREATH: 0
CONSTIPATION: 0
VOMITING: 0
WHEEZING: 0
DIARRHEA: 0

## 2022-02-07 NOTE — PROGRESS NOTES
2/7/2022    TELEHEALTH EVALUATION -- Audio/Visual (During QQVFF-75 public health emergency)   She is seen in follow-up virtually using doxy. made today. Telehealth is implemented due to the current pandemic in [place] caused by coronavirus. Previously, the patient has been advised the services billed to her insurance by [facility] ([facility]). HPI:  This patient was seen initially by the undersigned on her visit of 12/13/2021 for a new issue complaining of neck pain She previously has gone to the health department for issues and also seen Ms Ke García affiliated with [facility].  She is the daughter of another patient of ours, Jayna Borden, that we have been seeing for quite some time. She tells me today that about a month ago she had gone to the health department to have refills on her oral contraceptive medication. One of the main problems that she was having at her last visit with us was a complaint of having neck pain. We did do plain x-ray on her neck which showed no acute osseous abnormality. She was having lots of spasm in her neck and she was given a muscle relaxer at her visit here then. Additionally, because of her headache, she was given Fioricet No. 3, gabapentin 300 to use up to 3 times daily She has some tightness in her neck on the right side. She has seen chiropractor on a fairly regular basis. She tells me that she has had ongoing issues with her neck for some time. She has seen provider at the [address] [place] [address] and tells me that she did have MRI done in December 2020. She tells me that she did have a report of bulging disc to the right of midline. We did perform plain x-rays which showed She was given a trial of muscle relaxers that did not help except make her drowsy. She complains of fairly constant headache that starts in the occiput and then radiates cephaladly to retrobulbar area, only on the right side.   Because of the initial report that she gave me of having MRI in December of disc bulging to the right, I did decide to order a follow-up MRI. This MRI was done at [facility] on 12/27/21. It did show minimal disc dehydration and disc space narrowing at C4/5 and C7/T1. There was no evidence of disc herniation or impingement upon the cord or thecal sac noted. Today she tells me that she is having significant issues. She thought medication was helping but then last Monday she started to get up to get ready for school and began hurting quite severely. The pain was located primarily in the neck and she did have some radiation of numbness to the right hand and right upper extremity. This lasted approximately 5 to 10 minutes. She did go to the chiropractor and the chiropractor had a return every day last week but she states that this did not help. On Friday and Saturday of this past weekend, she did stay in bed all day. She states that she has had some nausea and actually did throw up on Saturday. I did tell her to stop the Naprosyn as I thought that that might be causing irritation in the lining of her stomach. She feels as though that the gabapentin that we put her on has been helping more effectively than anything basically. It does not seem as though the Fioricet No. 3 is helpful at this time so I am going to give her a trial of Lortab 7.5 and have her use 1 up to 6-hour intervals. Patient is advised this is likely to make her drowsy. She should not take while operating machinery or driving a car etc.  She will be dispensed 12 pills. She is to stop the Naprosyn and stop the Fioricet. We did call for referral to neurosurgical group. The patient was given an appointment to see neurosurgery group at [facility] but it was not until 3/31/22.   I did asked the patient today since she is having such severe issues if she was receptive to seeing [facility] neurosurgical group and she said that she was so we will try to interceded make referral to [facility] neurosurgical group and hopefully will be in a more timely manner. She states that she has very little energy and that she hurts all of the time now mostly in her neck. She is stated to have some radiculopathy to the right upper extremity as stated above as well. She does have a history of depression and anxiety. She is on Zoloft 100 and takes this at once daily. BuSpar 10 mg is taken at twice daily for anxiety as well.     For issues with GERD, she does take omeprazole 20 mg p.o. daily on an as-needed basis. Dianne Benjamin (:  2000) has requested an audio/video evaluation for the following concern(s):        Review of Systems   Constitutional: Negative. HENT: Negative. Eyes: Negative. Respiratory: Negative for cough, chest tightness, shortness of breath and wheezing. Cardiovascular: Negative for chest pain, palpitations and leg swelling. Gastrointestinal: Negative for constipation, diarrhea, nausea and vomiting. Genitourinary: Negative for hematuria. Musculoskeletal: Positive for arthralgias, neck pain and neck stiffness. Skin: Negative. Neurological: Negative. Psychiatric/Behavioral: Negative. Prior to Visit Medications    Medication Sig Taking? Authorizing Provider   butalbital-acetaminophen-caffeine-codeine (FIORICET WITH CODEINE) -80-30 MG per capsule Take 1 capsule by mouth every 8 hours as needed for Headaches for up to 30 days. Geoffrey Rojas MD   naproxen (NAPROSYN) 375 MG tablet Take 1 tablet by mouth 2 times daily (with meals)  Geoffrey Rojas MD   gabapentin (NEURONTIN) 300 MG capsule Take 1 capsule by mouth 3 times daily for 30 days.   Geoffrey Rojas MD   sertraline (ZOLOFT) 100 MG tablet Take 1 tablet by mouth daily  Geoffrey Rojas MD   tiZANidine (ZANAFLEX) 4 MG tablet Take 4 mg by mouth every 6 hours as needed  Historical Provider, MD   butalbital-APAP-caffeine (FIORICET) -40 MG CAPS per capsule Take 1 capsule by mouth every 8 hours as needed for Headaches  William Lopez MD   busPIRone (BUSPAR) 10 MG tablet Take 10 mg by mouth 2 times daily  Historical Provider, MD   Norgestim-Eth [place] Triphasic (TRI-LO-DYLLAN PO) Take 1 each by mouth daily  Historical Provider, MD   OMEPRAZOLE PO Take 20 mg by mouth daily  Historical Provider, MD       Social History     Tobacco Use    Smoking status: Never Smoker    Smokeless tobacco: Never Used   Substance Use Topics    Alcohol use: Never    Drug use: Never        Allergies   Allergen Reactions    Biaxin [Clarithromycin] Rash    Sulfa Antibiotics Rash   ,   Past Medical History:   Diagnosis Date    Anxiety    ,   Past Surgical History:   Procedure Laterality Date    WISDOM TOOTH EXTRACTION  2016   ,   Social History     Tobacco Use    Smoking status: Never Smoker    Smokeless tobacco: Never Used   Substance Use Topics    Alcohol use: Never    Drug use: Never   ,   Family History   Problem Relation Age of Onset    Diabetes Father     High Blood Pressure Father    ,   Immunization History   Administered Date(s) Administered    COVID-19, Pfizer Purple top, DILUTE for use, 12+ yrs, 30mcg/0.3mL dose 03/20/2021, 04/15/2021       PHYSICAL EXAMINATION:  [ INSTRUCTIONS:  \"[x]\" Indicates a positive item  \"[]\" Indicates a negative item  -- DELETE ALL ITEMS NOT EXAMINED]  Vital Signs: (As obtained by patient/caregiver or practitioner observation)    Blood pressure-  Heart rate-    Respiratory rate-    Temperature-  Pulse oximetry-     Constitutional: [x] Appears well-developed and well-nourished [x] No apparent distress      [] Abnormal-   Mental status  [x] Alert and awake  [x] Oriented to person/place/time [x]Able to follow commands      Eyes:  EOM    [x]  Normal  [] Abnormal-  Sclera  [x]  Normal  [] Abnormal -         Discharge [x]  None visible  [] Abnormal -    HENT:   [x] Normocephalic, atraumatic.   [] Abnormal   [x] Mouth/Throat: Mucous membranes are moist.     External Ears [x] Normal [] Abnormal-     Neck: [x] No visualized mass     Pulmonary/Chest: [x] Respiratory effort normal.  [x] No visualized signs of difficulty breathing or respiratory distress        [] Abnormal-      Musculoskeletal:   [] Normal gait with no signs of ataxia         [] Normal range of motion of neck        [] Abnormal-       Neurological:        [x] No Facial Asymmetry (Cranial nerve 7 motor function) (limited exam to video visit)          [x] No gaze palsy        [] Abnormal-         Skin:        [x] No significant exanthematous lesions or discoloration noted on facial skin         [] Abnormal-            Psychiatric:       [x] Normal Affect [x] No Hallucinations        [] Abnormal-     Other pertinent observable physical exam findings-     ASSESSMENT/PLAN:   Diagnosis Orders   1. Neck pain  HYDROcodone-acetaminophen (NORCO) 7.5-325 MG per tablet    [facility]   2. Right upper extremity numbness  HYDROcodone-acetaminophen (NORCO) 7.5-325 MG per tablet    [facility]   3. Depression, unspecified depression type     4. Neuropathy       I am having Ms. Benavides Lasso maintain her :   Outpatient Medications Marked as Taking for the 2/7/22 encounter (Virtual Visit) with Shivani Montana MD   Medication Sig Dispense Refill    HYDROcodone-acetaminophen (NORCO) 7.5-325 MG per tablet Take 1 tablet by mouth every 6 hours as needed for Pain for up to 5 days. 12 tablet 0   ,Patient states they are no longer utilizing these medication:   Medications Discontinued During This Encounter   Medication Reason    naproxen (NAPROSYN) 375 MG tablet Therapy completed    I have refilled the following medication today:   Requested Prescriptions     Signed Prescriptions Disp Refills    HYDROcodone-acetaminophen (NORCO) 7.5-325 MG per tablet 12 tablet 0     Sig: Take 1 tablet by mouth every 6 hours as needed for Pain for up to 5 days.    Areta Dose, was evaluated through a synchronous (real-time) audio-video encounter. The patient (or guardian if applicable) is aware that this is a billable service, which includes applicable co-pays. This Virtual Visit was conducted with patient's (and/or legal guardian's) consent. The visit was conducted pursuant to the emergency declaration under the [address] [place] [address], [address] [place] [address] authority and the [employer] [place] [employer] Act. Patient identification was verified, and a caregiver was present when appropriate. The patient was located at home in a state where the provider was licensed to provide care. Total time spent on this encounter: Not billed by time    --Irish Azul MD on 2/7/2022 at 3:09 PM    An electronic signature was used to authenticate this note.

## 2022-02-08 ENCOUNTER — TELEPHONE (OUTPATIENT)
Dept: NEUROSURGERY | Age: 22
End: 2022-02-08

## 2022-02-08 NOTE — TELEPHONE ENCOUNTER
[facility] New Patient Questionnaire    1. Diagnosis/Reason for Referral?    Neck  Pain     2. Who is completing questionnaire? Patient  Caregiver Family      3. Has the patient had any previous spinal/brain surgeries? No        A. If yes, what is the name of the facility in which the surgery was performed? B. Procedure/Surgery performed? C. Who was the surgeon? D. When was the surgery? MM/YY       E. Did the patient improve after the surgery? 4. Is this a second opinion? If yes, Dr. Jeffery Everett would like to review patient first before making the appointment. 5. Have MRI Images been obtain within the last year? Yes  No      XR  CT     If yes, where was the imaging performed? [facility]      If yes, what part of the body? Lumbar  Cervical  Thoracic  Brain     If yes, when was it obtained? 12/2021    Note: if the scan was performed at a facility other than [facility], the disc will need to be brought to the appointment or we need to reach out to obtain the disc. A. Was the patient instructed to provide the disc? Yes   No      8. Has the patient had a NCV/EMG within the last year? Yes  No     If yes, where was it performed and date? MM/YY  Location:      9. Has the patient been to Physical Therapy? Yes  No     If yes, what location, how long attended, and last visit? Location: [facility]        Therapy Lasted:    Date of Last Visit: 2020      10. Has the patient been to Pain Management? Yes  No     If yes, what location and last visit     Location:   Last Visit:   Is it helping?

## 2022-02-08 NOTE — TELEPHONE ENCOUNTER
1st attempt to call patient to schedule appointment, left voicemail with call back number [phone]

## 2022-02-15 ENCOUNTER — TELEPHONE (OUTPATIENT)
Dept: NEUROSURGERY | Age: 22
End: 2022-02-15

## 2022-02-15 NOTE — TELEPHONE ENCOUNTER
[facility] and spoke with Carmelita in medical records. She voiced that she would get this patients MRI report faxed to our office today.

## 2022-02-16 ENCOUNTER — OFFICE VISIT (OUTPATIENT)
Dept: NEUROSURGERY | Age: 22
End: 2022-02-16
Payer: COMMERCIAL

## 2022-02-16 VITALS
BODY MASS INDEX: 31.01 KG/M2 | OXYGEN SATURATION: 98 % | DIASTOLIC BLOOD PRESSURE: 85 MMHG | HEART RATE: 96 BPM | HEIGHT: 63 IN | WEIGHT: 175 LBS | SYSTOLIC BLOOD PRESSURE: 129 MMHG

## 2022-02-16 DIAGNOSIS — R11.2 NON-INTRACTABLE VOMITING WITH NAUSEA, UNSPECIFIED VOMITING TYPE: ICD-10-CM

## 2022-02-16 DIAGNOSIS — R51.9 FREQUENT HEADACHES: Primary | ICD-10-CM

## 2022-02-16 DIAGNOSIS — R20.0 RIGHT ARM NUMBNESS: ICD-10-CM

## 2022-02-16 PROCEDURE — 99204 OFFICE O/P NEW MOD 45 MIN: CPT | Performed by: NURSE PRACTITIONER

## 2022-02-16 RX ORDER — LEVOTHYROXINE SODIUM 0.07 MG/1
75 TABLET ORAL DAILY
COMMUNITY
End: 2022-06-06

## 2022-02-16 ASSESSMENT — ENCOUNTER SYMPTOMS
RESPIRATORY NEGATIVE: 1
GASTROINTESTINAL NEGATIVE: 1
BLURRED VISION: 1

## 2022-02-16 NOTE — PROGRESS NOTES
Review of Systems   Constitutional: Negative. Eyes: Positive for blurred vision. Respiratory: Negative. Cardiovascular: Negative. Gastrointestinal: Negative. Genitourinary: Negative. Musculoskeletal: Positive for myalgias and neck pain. Skin: Negative. Neurological: Positive for dizziness, tingling and headaches. Endo/Heme/Allergies: Negative. Psychiatric/Behavioral: Negative.

## 2022-02-16 NOTE — PROGRESS NOTES
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 She states that she started developing neck pain about 2 years ago. She does not recall and specific injury. The pain does radiate into the biceps, forearm, and entire hand. Her pain is mostly located in the neck. The patient complains of numbness. She does not have numbness in the fingertips, trouble using hands to perform fine motor tasks or ataxia. She is having daily headaches where she vomits prior to the headache and has to take nausea medicine daily. She complains of dizzy spells to where she will have to sit down or she will fall. She has had some double vision about a month ago but this is not a daily thing. The patient has underwent a non-operative treatment course that has included:  NSAIDs   Muscle Relaxers (tizanidine)  Gabapentin  Opiates (Norco)  Physical Therapy  ([facility] in 2020)  Chiropractic Manipulation (no help)  Dry needling  Fioricet for headaches which did not help    Of note she does not use tobacco and does not take blood thinning medications.                Past Medical History:   Diagnosis Date    Anxiety        Past Surgical History:   Procedure Laterality Date    WISDOM TOOTH EXTRACTION  2016       Current Outpatient Medications   Medication Sig Dispense Refill    levothyroxine (SYNTHROID) 75 MCG tablet Take 75 mcg by mouth Daily      sertraline (ZOLOFT) 100 MG tablet Take 1 tablet by mouth daily 30 tablet 5    tiZANidine (ZANAFLEX) 4 MG tablet Take 4 mg by mouth every 6 hours as needed      busPIRone (BUSPAR) 10 MG tablet Take 10 mg by mouth 2 times daily      Norgestim-Eth [place] Triphasic (TRI-LO-DYLLAN PO) Take 1 each by mouth daily      butalbital-acetaminophen-caffeine-codeine (FIORICET WITH CODEINE) -32-30 MG per capsule Take 1 capsule by mouth every 8 hours as needed for Headaches for up to 30 days. (Patient not taking: Reported on 2/16/2022) 90 capsule 0    gabapentin (NEURONTIN) 300 MG capsule Take 1 capsule by mouth 3 times daily for 30 days. (Patient not taking: Reported on 2/16/2022) 90 capsule 0    butalbital-APAP-caffeine (FIORICET) -40 MG CAPS per capsule Take 1 capsule by mouth every 8 hours as needed for Headaches (Patient not taking: Reported on 2/16/2022) 90 capsule 0    OMEPRAZOLE PO Take 20 mg by mouth daily       No current facility-administered medications for this visit. Allergies:  Biaxin [clarithromycin] and Sulfa antibiotics    Social History:   Social History     Tobacco Use   Smoking Status Never Smoker   Smokeless Tobacco Never Used     Social History     Substance and Sexual Activity   Alcohol Use Never         Family History:   Family History   Problem Relation Age of Onset    Diabetes Father     High Blood Pressure Father        REVIEW OF SYSTEMS:  Constitutional: Negative. Eyes: Positive for blurred vision. Respiratory: Negative. Cardiovascular: Negative. Gastrointestinal: Negative. Genitourinary: Negative. Musculoskeletal: Positive for myalgias and neck pain. Skin: Negative. Neurological: Positive for dizziness, tingling and headaches. Endo/Heme/Allergies: Negative. Psychiatric/Behavioral: Negative. PHYSICAL EXAM:  Vitals:    02/16/22 1504   BP: 129/85   Pulse: 96   SpO2: 98%     Constitutional: appears well-developed and well-nourished.    Eyes - conjunctiva normal.  Pupils react to light  Ear, nose, throat - hearing intact to finger rub, No scars, masses, or lesions over external nose or ears, no atrophy oftongue  Neck- symmetric, no masses noted, no jugular vein distension  Respiration- chest wall appears symmetric, good expansion, normal effort without use of accessory muscles  Musculoskeletal - no significant wasting of muscles noted, no bony deformities, gait no gross ataxia  Extremities- no clubbing, cyanosis oredema  Skin - warm, dry, and intact. No rash, erythema, or pallor. Psychiatric - mood, affect, and behavior appear normal.     Neurologic Examination  Awake, Alert and oriented x 4  Normal speech pattern, following commands    Motor:  RIGHT: hand grasp 5/5    finger extension 5/5    bicep 5/5    triceps 5/5    deltoid 5/5      LEFT:   hand grasp 5/5    finger extension 5/5    bicep 5/5    triceps 5/5    deltoid 5/5      No deficits to light touch or pinprick sensation; except decrease to RIGHT thumb  Reflexes are 2+ and symmetric  No clonus or Hoffmans sign  No myofacial tenderness to palpation  Normal Gait pattern        DATA and IMAGING:    Nursing/pcp notes, imaging, labs, and vitals reviewed. PT,OT and/or speech notes reviewed    No results found for: WBC, HGB, HCT, MCV, PLTNo results found for: NA, K, CL, CO2, BUN, CREATININE, GLUCOSE, CALCIUM, PROT, LABALBU, BILITOT, ALKPHOS, AST, ALT, LABGLOM, GFRAA, AGRATIO, GLOBNo results found for: INR, PROTIME    Narrative   EXAMINATION:  XR CERVICAL SPINE (2-3 VIEWS)  12/13/2021 11:10 AM   HISTORY: Neck pain   COMPARISON: No comparison study. TECHNIQUE: 3 views: AP lateral and odontoid projection imaging   FINDINGS:    Vertebral body heights and disc space heights are maintained. The alignment is anatomical.   The prevertebral soft tissues are normal.   There is no fracture or subluxation. There is no radiographic evidence of significant degenerative changes.       Impression   1. No acute osseous abnormality.    Signed by Dr Allyson Stoddard   I have personally reviewed these images and my interpretation is:  Very mild straightening of the cervical spine  No apparent DDD or osseous abnormality       MRI Cervical Spine (12/27/2021) [facility]  I have personally reviewed the images and my interpretation is:  Very mild DDD at C4-5 and C7-T1  No neural element compression noted         ASSESSMENT:    Clive Duff is a 19 y.o. female with complaints of neck pain, right arm paresthesias, frequent headaches with associated N/V. ICD-10-CM    1. Frequent headaches  R51.9 External Referral To Physical Therapy     MRI BRAIN W WO CONTRAST   2. Non-intractable vomiting with nausea, unspecified vomiting type  R11.2 External Referral To Physical Therapy     MRI BRAIN W WO CONTRAST   3. Right arm numbness  R20.0 Nerve Conduction Test with EMG     External Referral To Physical Therapy     MRI BRAIN W WO CONTRAST       PLAN:  I have discussed and reviewed the results of the MRI/XR cervical spine with Ms. Hodge at length. I explained that there is very very mild DDD at just 2 levels, there is no disc herniation or any major neural element compression to explain RUE paresthesias or the severity of her neck pain. Some of her neck pain could be musculoskeletal in nature due to stress, anxiety, posture, looking down at a phone, sitting at a computer, etc. Definitely no neurosurgical intervention warranted in this setting. I am going to recommend she attempt more PT to target cervical, trapezial, and occipital areas.   I will obtain additional imaging of her brain due to the dizziness and severity of headaches.    -Obtain MRI brain (to be done at [facility]) was told to bring CD  -NCS/EMG  -Follow up after testing       PATRICIA Murphy

## 2022-02-17 ENCOUNTER — PATIENT MESSAGE (OUTPATIENT)
Dept: FAMILY MEDICINE CLINIC | Age: 22
End: 2022-02-17

## 2022-02-17 ENCOUNTER — TELEMEDICINE (OUTPATIENT)
Dept: FAMILY MEDICINE CLINIC | Age: 22
End: 2022-02-17
Payer: COMMERCIAL

## 2022-02-17 DIAGNOSIS — F41.9 ANXIETY: ICD-10-CM

## 2022-02-17 DIAGNOSIS — M54.2 NECK PAIN: ICD-10-CM

## 2022-02-17 DIAGNOSIS — G44.039 EPISODIC PAROXYSMAL HEMICRANIA, NOT INTRACTABLE: ICD-10-CM

## 2022-02-17 DIAGNOSIS — R20.0 RIGHT UPPER EXTREMITY NUMBNESS: ICD-10-CM

## 2022-02-17 DIAGNOSIS — F32.A DEPRESSION, UNSPECIFIED DEPRESSION TYPE: ICD-10-CM

## 2022-02-17 PROCEDURE — 99214 OFFICE O/P EST MOD 30 MIN: CPT | Performed by: FAMILY MEDICINE

## 2022-02-17 RX ORDER — HYDROCODONE BITARTRATE AND ACETAMINOPHEN 7.5; 325 MG/1; MG/1
1 TABLET ORAL EVERY 8 HOURS PRN
Qty: 60 TABLET | Refills: 0 | Status: SHIPPED | OUTPATIENT
Start: 2022-02-17 | End: 2022-02-18 | Stop reason: SDUPTHER

## 2022-02-17 RX ORDER — TIZANIDINE 4 MG/1
4 TABLET ORAL EVERY 8 HOURS PRN
Qty: 60 TABLET | Refills: 1 | Status: SHIPPED | OUTPATIENT
Start: 2022-02-17 | End: 2022-08-23 | Stop reason: SDUPTHER

## 2022-02-17 ASSESSMENT — ENCOUNTER SYMPTOMS
EYES NEGATIVE: 1
DIARRHEA: 0
VOMITING: 0
CONSTIPATION: 0
WHEEZING: 0
BLOOD IN STOOL: 0
COUGH: 0
CHEST TIGHTNESS: 0
NAUSEA: 0
SHORTNESS OF BREATH: 0

## 2022-02-17 NOTE — PROGRESS NOTES
2/17/2022    TELEHEALTH EVALUATION -- Audio/Visual (During ADSBI-54 public health emergency)  She is seen in follow-up virtually using doxy. made today. Telehealth is implemented due to the current pandemic in [place] caused by coronavirus. Previously, the patient has been advised the services [facility] ([facility]). HPI:  This patient was seen initially by the undersigned on her visit of 12/13/2021 for a new issue complaining of neck pain She previously has gone to the health department for issues and also has  seen Ms Claudia Rodney with [facility]. Quincy Alexis is the daughter of another patient of ours, Amy Jimenez, that we have been seeing for quite some time. Quincy Alexis tells me today that about a month ago she had gone to the health department to have refills on her oral contraceptive medication. One of the main problems that she was having at her last visit with us was a complaint of having neck pain. We did do plain x-ray on her neck which showed no acute osseous abnormality. She was having lots of spasm in her neck and she was given a muscle relaxer at her visit here then. Additionally, because of her headache, she was given Fioricet No. 3, gabapentin 300 to use up to 3 times daily She has some tightness in her neck on the right side.  She has seen chiropractor on a fairly regular basis. Quincy Alexis tells me that she has had ongoing issues with her neck for some time. Quincy Alexis has seen provider at the [address] [facility] and tells me that she did have MRI done in December 2020.  She tells me that she did have a report of bulging disc to the right of midline. We did perform plain x-rays which showed She was given a trial of muscle relaxers that did not help except make her drowsy.  She complains of fairly constant headache that starts in the occiput and then radiates cephaladly to retrobulbar area, only on the right side.   Because of the initial report that she gave me of having MRI in December of disc bulging to the right, I did decide to order a follow-up MRI. This MRI was done at [facility] on 12/27/21. It did show minimal disc dehydration and disc space narrowing at C4/5 and C7/T1. There was no evidence of disc herniation or impingement upon the cord or thecal sac noted. Today she tells me that she is having significant issues. She thought medication was helping but then last Monday she started to get up to get ready for school and began hurting quite severely. The pain was located primarily in the neck and she did have some radiation of numbness to the right hand and right upper extremity. This lasted approximately 5 to 10 minutes. She did go to the chiropractor and the chiropractor had a return every day last week but she states that this did not help. On Friday and Saturday of this past weekend, she did stay in bed all day. She states that she has had some nausea and actually did throw up on Saturday. I did tell her to stop the Naprosyn as I thought that that might be causing irritation in the lining of her stomach. She feels as though that the gabapentin that we put her on has been helping more effectively than anything basically. It does not seem as though the Fioricet No. 3 is helpful at this time so I am going to give her a trial of Lortab 7.5 and have her use 1 up to 6-hour intervals. Patient is advised this is likely to make her drowsy. She should not take while operating machinery or driving a car etc.  She will be dispensed 12 pills. She is to stop the Naprosyn and stop the Fioricet. We did call for referral to neurosurgical group. The patient was given an appointment to see neurosurgery group at [facility] but it was not until 3/31/22.   I did asked the patient today since she is having such severe issues if she was receptive to seeing [facility] neurosurgical group and she said that she was so we will try to interceded make referral to [address] [place] [address] neurosurgical group and hopefully will be in a more timely manner. Dayan Morales tells me today that she has now seen PATRICIA Ceballos who works with Andra Duff of neurosurgery department at [address] [place] [address]. She is having some nerve conduction studies done on 3/3/2022. She also is scheduled to have MRI of her brain. We are doing this to make sure that she is not having something pressing on the nerve roots. She then has follow-up with the neurosurgical office on 3/10/2022. Today, she did ask us to refill her Zanaflex which she previously was given many years ago. She utilizes it up to 3 times daily but usually only at night. Sometimes she gets by with just taking 1/2 pill at night. We are refilling that for her at [address] in [facility]. Additionally, I am giving her some more of the Lortab 7.5 to use up to 3 times daily for what is appearing now to be a chronic issue. She is stated to have some radiculopathy to the right upper extremity as stated above as well. She does have a history of depression and anxiety.  She is on Zoloft 100 and takes this at once daily.  BuSpar 10 mg is taken at twice daily for anxiety as well.     For issues with GERD, she does take omeprazole 20 mg p.o. daily on an as-needed basis. Tin Baron (:  2000) has requested an audio/video evaluation for the following concern(s):        Review of Systems   Constitutional: Negative. HENT: Negative. Eyes: Negative. Respiratory: Negative for cough, chest tightness, shortness of breath and wheezing. Cardiovascular: Negative for chest pain, palpitations and leg swelling. Gastrointestinal: Negative for blood in stool, constipation, diarrhea, nausea and vomiting. Genitourinary: Negative for hematuria. Musculoskeletal: Negative. Skin: Negative. Neurological: Negative. Psychiatric/Behavioral: Negative. Prior to Visit Medications    Medication Sig Taking?  Authorizing Provider   levothyroxine (SYNTHROID) 75 MCG tablet Take 75 mcg by mouth Daily  Historical Provider, MD   butalbital-acetaminophen-caffeine-codeine (FIORICET WITH CODEINE) -30-30 MG per capsule Take 1 capsule by mouth every 8 hours as needed for Headaches for up to 30 days. Patient not taking: Reported on 2/16/2022  Liliane Chen MD   gabapentin (NEURONTIN) 300 MG capsule Take 1 capsule by mouth 3 times daily for 30 days.   Patient not taking: Reported on 2/16/2022  Liliane Chen MD   sertraline (ZOLOFT) 100 MG tablet Take 1 tablet by mouth daily  Liliane Chen MD   tiZANidine (ZANAFLEX) 4 MG tablet Take 4 mg by mouth every 6 hours as needed  Historical Provider, MD   butalbital-APAP-caffeine (FIORICET) -40 MG CAPS per capsule Take 1 capsule by mouth every 8 hours as needed for Headaches  Patient not taking: Reported on 2/16/2022  Liliane Chen MD   busPIRone (BUSPAR) 10 MG tablet Take 10 mg by mouth 2 times daily  Historical Provider, MD   Norgestim-Eth Estrad Triphasic (TRI-LO-DYLLAN PO) Take 1 each by mouth daily  Historical Provider, MD   OMEPRAZOLE PO Take 20 mg by mouth daily  Historical Provider, MD       Social History     Tobacco Use    Smoking status: Never Smoker    Smokeless tobacco: Never Used   Substance Use Topics    Alcohol use: Never    Drug use: Never        Allergies   Allergen Reactions    Biaxin [Clarithromycin] Rash    Sulfa Antibiotics Rash   ,   Past Medical History:   Diagnosis Date    Anxiety    ,   Past Surgical History:   Procedure Laterality Date    WISDOM TOOTH EXTRACTION  2016   ,   Social History     Tobacco Use    Smoking status: Never Smoker    Smokeless tobacco: Never Used   Substance Use Topics    Alcohol use: Never    Drug use: Never   ,   Family History   Problem Relation Age of Onset    Diabetes Father     High Blood Pressure Father    ,   Immunization History   Administered Date(s) Administered    COVID-19, Pfizer Purple top, DILUTE for use, 12+ yrs, 30mcg/0.3mL dose 03/20/2021, 04/15/2021       PHYSICAL EXAMINATION:  [ INSTRUCTIONS:  \"[x]\" Indicates a positive item  \"[]\" Indicates a negative item  -- DELETE ALL ITEMS NOT EXAMINED]  Vital Signs: (As obtained by patient/caregiver or practitioner observation)    Blood pressure-  Heart rate-    Respiratory rate-    Temperature-  Pulse oximetry-     Constitutional: [x] Appears well-developed and well-nourished [x] No apparent distress      [] Abnormal-   Mental status  [x] Alert and awake  [x] Oriented to person/place/time [x]Able to follow commands      Eyes:  EOM    [x]  Normal  [] Abnormal-  Sclera  [x]  Normal  [] Abnormal -         Discharge [x]  None visible  [] Abnormal -    HENT:   [x] Normocephalic, atraumatic. [] Abnormal   [x] Mouth/Throat: Mucous membranes are moist.     External Ears [x] Normal  [] Abnormal-     Neck: [x] No visualized mass     Pulmonary/Chest: [x] Respiratory effort normal.  [x] No visualized signs of difficulty breathing or respiratory distress        [] Abnormal-      Musculoskeletal:   [] Normal gait with no signs of ataxia         [] Normal range of motion of neck        [] Abnormal-       Neurological:        [x] No Facial Asymmetry (Cranial nerve 7 motor function) (limited exam to video visit)          [x] No gaze palsy        [] Abnormal-         Skin:        [x] No significant exanthematous lesions or discoloration noted on facial skin         [] Abnormal-            Psychiatric:       [x] Normal Affect [x] No Hallucinations        [] Abnormal-     Other pertinent observable physical exam findings-     ASSESSMENT/PLAN:   Diagnosis Orders   1. Neck pain  HYDROcodone-acetaminophen (NORCO) 7.5-325 MG per tablet   2. Right upper extremity numbness  HYDROcodone-acetaminophen (NORCO) 7.5-325 MG per tablet   3. Episodic paroxysmal hemicrania, not intractable     4. Anxiety     5.  Depression, unspecified depression type       I am having Ms. Galan Amen maintain her :

## 2022-02-18 DIAGNOSIS — R20.0 RIGHT UPPER EXTREMITY NUMBNESS: ICD-10-CM

## 2022-02-18 DIAGNOSIS — M54.2 NECK PAIN: ICD-10-CM

## 2022-02-18 RX ORDER — HYDROCODONE BITARTRATE AND ACETAMINOPHEN 7.5; 325 MG/1; MG/1
1 TABLET ORAL EVERY 8 HOURS PRN
Qty: 48 TABLET | Refills: 0 | Status: SHIPPED | OUTPATIENT
Start: 2022-02-18 | End: 2022-03-20

## 2022-03-02 ENCOUNTER — HOSPITAL ENCOUNTER (OUTPATIENT)
Dept: MRI IMAGING | Age: 22
Discharge: HOME | End: 2022-03-02
Payer: COMMERCIAL

## 2022-03-02 DIAGNOSIS — R20.0 RIGHT ARM NUMBNESS: ICD-10-CM

## 2022-03-02 DIAGNOSIS — R11.2 NON-INTRACTABLE VOMITING WITH NAUSEA, UNSPECIFIED VOMITING TYPE: ICD-10-CM

## 2022-03-02 DIAGNOSIS — R51.9 FREQUENT HEADACHES: ICD-10-CM

## 2022-03-02 PROCEDURE — 70553 MRI BRAIN STEM W/O & W/DYE: CPT

## 2022-03-02 PROCEDURE — 6360000004 HC RX CONTRAST MEDICATION: Performed by: NURSE PRACTITIONER

## 2022-03-02 PROCEDURE — A9577 INJ MULTIHANCE: HCPCS | Performed by: NURSE PRACTITIONER

## 2022-03-02 RX ADMIN — GADOBENATE DIMEGLUMINE 15 ML: 529 INJECTION, SOLUTION INTRAVENOUS at 16:51

## 2022-03-03 ENCOUNTER — TELEPHONE (OUTPATIENT)
Dept: NEUROSURGERY | Age: 22
End: 2022-03-03

## 2022-03-03 NOTE — TELEPHONE ENCOUNTER
Patient had a follow up appointment on 03/10/2022 with PATRICIA Castellanos but patient called today and rescheduled her EEG and was suppose to review on 3/10. EEG was rescheduled until 3/17/22 and appointment reschedule to 04/05/2022 @1115am.   Called patient and no answer - message left to return call to [phone].  sh

## 2022-03-14 DIAGNOSIS — R20.0 RIGHT UPPER EXTREMITY NUMBNESS: ICD-10-CM

## 2022-03-14 DIAGNOSIS — M54.2 NECK PAIN: ICD-10-CM

## 2022-03-14 RX ORDER — HYDROCODONE BITARTRATE AND ACETAMINOPHEN 7.5; 325 MG/1; MG/1
1 TABLET ORAL EVERY 8 HOURS PRN
Qty: 48 TABLET | Refills: 0 | OUTPATIENT
Start: 2022-03-14 | End: 2022-04-13

## 2022-03-14 NOTE — TELEPHONE ENCOUNTER
Dr. Jovanny Bai had sent in a rx for patient on 2/18/22 that was not filled at that time. She is needing it now. Pharmacy is asking if you will fill one time. Please advise.

## 2022-05-06 ENCOUNTER — OFFICE VISIT (OUTPATIENT)
Dept: PRIMARY CARE CLINIC | Age: 22
End: 2022-05-06
Payer: COMMERCIAL

## 2022-05-06 VITALS
WEIGHT: 170 LBS | HEIGHT: 63 IN | DIASTOLIC BLOOD PRESSURE: 82 MMHG | BODY MASS INDEX: 30.12 KG/M2 | SYSTOLIC BLOOD PRESSURE: 124 MMHG | TEMPERATURE: 97.1 F | OXYGEN SATURATION: 98 % | HEART RATE: 86 BPM

## 2022-05-06 DIAGNOSIS — R20.2 PARESTHESIA OF BOTH HANDS: ICD-10-CM

## 2022-05-06 DIAGNOSIS — F41.1 GAD (GENERALIZED ANXIETY DISORDER): ICD-10-CM

## 2022-05-06 DIAGNOSIS — F33.0 MILD EPISODE OF RECURRENT MAJOR DEPRESSIVE DISORDER (HCC): ICD-10-CM

## 2022-05-06 DIAGNOSIS — F33.0 MILD EPISODE OF RECURRENT MAJOR DEPRESSIVE DISORDER (HCC): Primary | ICD-10-CM

## 2022-05-06 LAB
ALBUMIN SERPL-MCNC: 4.5 G/DL (ref 3.5–5.2)
ALP BLD-CCNC: 97 U/L (ref 35–104)
ALT SERPL-CCNC: 21 U/L (ref 5–33)
ANION GAP SERPL CALCULATED.3IONS-SCNC: 18 MMOL/L (ref 7–19)
AST SERPL-CCNC: 20 U/L (ref 5–32)
BASOPHILS ABSOLUTE: 0 K/UL (ref 0–0.2)
BASOPHILS RELATIVE PERCENT: 0.3 % (ref 0–1)
BILIRUB SERPL-MCNC: <0.2 MG/DL (ref 0.2–1.2)
BUN BLDV-MCNC: 9 MG/DL (ref 6–20)
CALCIUM SERPL-MCNC: 10 MG/DL (ref 8.6–10)
CHLORIDE BLD-SCNC: 103 MMOL/L (ref 98–111)
CO2: 21 MMOL/L (ref 22–29)
CREAT SERPL-MCNC: 0.6 MG/DL (ref 0.5–0.9)
EOSINOPHILS ABSOLUTE: 0.1 K/UL (ref 0–0.6)
EOSINOPHILS RELATIVE PERCENT: 0.6 % (ref 0–5)
GFR AFRICAN AMERICAN: >59
GFR NON-AFRICAN AMERICAN: >60
GLUCOSE BLD-MCNC: 83 MG/DL (ref 74–109)
HBA1C MFR BLD: 5.4 % (ref 4–6)
HCT VFR BLD CALC: 42 % (ref 37–47)
HEMOGLOBIN: 13 G/DL (ref 12–16)
IMMATURE GRANULOCYTES #: 0 K/UL
LYMPHOCYTES ABSOLUTE: 3.5 K/UL (ref 1.1–4.5)
LYMPHOCYTES RELATIVE PERCENT: 33.9 % (ref 20–40)
MCH RBC QN AUTO: 28.3 PG (ref 27–31)
MCHC RBC AUTO-ENTMCNC: 31 G/DL (ref 33–37)
MCV RBC AUTO: 91.5 FL (ref 81–99)
MONOCYTES ABSOLUTE: 0.5 K/UL (ref 0–0.9)
MONOCYTES RELATIVE PERCENT: 5.1 % (ref 0–10)
NEUTROPHILS ABSOLUTE: 6.3 K/UL (ref 1.5–7.5)
NEUTROPHILS RELATIVE PERCENT: 59.7 % (ref 50–65)
PDW BLD-RTO: 13.8 % (ref 11.5–14.5)
PLATELET # BLD: 353 K/UL (ref 130–400)
PMV BLD AUTO: 12.4 FL (ref 9.4–12.3)
POTASSIUM SERPL-SCNC: 4.2 MMOL/L (ref 3.5–5)
RBC # BLD: 4.59 M/UL (ref 4.2–5.4)
SODIUM BLD-SCNC: 142 MMOL/L (ref 136–145)
T4 FREE: 1.47 NG/DL (ref 0.93–1.7)
TOTAL PROTEIN: 7.1 G/DL (ref 6.6–8.7)
TSH SERPL DL<=0.05 MIU/L-ACNC: 1.01 UIU/ML (ref 0.27–4.2)
VITAMIN B-12: 655 PG/ML (ref 211–946)
VITAMIN D 25-HYDROXY: 55.7 NG/ML
WBC # BLD: 10.5 K/UL (ref 4.8–10.8)

## 2022-05-06 PROCEDURE — 99214 OFFICE O/P EST MOD 30 MIN: CPT | Performed by: NURSE PRACTITIONER

## 2022-05-06 RX ORDER — VENLAFAXINE HYDROCHLORIDE 75 MG/1
75 CAPSULE, EXTENDED RELEASE ORAL DAILY
Qty: 30 CAPSULE | Refills: 11 | Status: SHIPPED | OUTPATIENT
Start: 2022-05-06 | End: 2022-07-19 | Stop reason: SDUPTHER

## 2022-05-06 SDOH — HEALTH STABILITY: PHYSICAL HEALTH: ON AVERAGE, HOW MANY DAYS PER WEEK DO YOU ENGAGE IN MODERATE TO STRENUOUS EXERCISE (LIKE A BRISK WALK)?: 5 DAYS

## 2022-05-06 SDOH — HEALTH STABILITY: PHYSICAL HEALTH: ON AVERAGE, HOW MANY MINUTES DO YOU ENGAGE IN EXERCISE AT THIS LEVEL?: 50 MIN

## 2022-05-06 ASSESSMENT — ENCOUNTER SYMPTOMS
COUGH: 0
VOMITING: 0
ABDOMINAL PAIN: 0
BACK PAIN: 0
CONSTIPATION: 0
WHEEZING: 0
SHORTNESS OF BREATH: 0
NAUSEA: 0

## 2022-05-06 ASSESSMENT — SOCIAL DETERMINANTS OF HEALTH (SDOH)

## 2022-05-06 NOTE — PROGRESS NOTES
Barbara Baires (:  2000) is a 19 y.o. female,New patient, here for evaluation of the following chief complaint(s):  New Patient (former PCP Dr. Wiliam Amin) and Anxiety (wanting to change anxiety medications)      ASSESSMENT/PLAN:    ICD-10-CM    1. Mild episode of recurrent major depressive disorder (HCC)  F33.0 venlafaxine (EFFEXOR XR) 75 MG extended release capsule  Stop zoloft   Change and monitor       CBC with Auto Differential   2. WILLIAM (generalized anxiety disorder)  F41.1 venlafaxine (EFFEXOR XR) 75 MG extended release capsule  Cont present   Doing well       CBC with Auto Differential   3. Paresthesia of both hands  R20.2 TSH     T4, Free     Hemoglobin A1C     Vitamin D 25 Hydroxy     Comprehensive Metabolic Panel     Vitamin B12  Monitor   Following with ortho for pain         Return in about 1 month (around 2022) for 1 month follow up parathesia fatigue and anxiety.     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   Doing well at [employer]     Patient has been following with [facility]  For neck issues  Was bothersome and trying to figure out what exactly is causing it  Is going end of may for nerve conduction for arm issues  And monitors for this   In process of having issues    The pain gets intense and causing her to throw up and get extreme migraine and want to throw up and pass out      Reports is having some anxiety and stress  She has been zoloft daily   Twice a day buspar   Reports she has taking this for around 6 years  She feels like not herself anymore   And more depressed than normal  She has lack of motivation to make herself do anything    She reports super stress will have panic attacks   But not crazy  But in turn she feels depressed  But knows lots of difference but still feels this well    Reports not sleeping at night   She typically will lay down at 930-10  But 1230-1 before falls asleep  She will at times takes half -1/2 to fall asleep   Less than 6 hours  She never feels energy   And feels exhausted    /82 (Site: Right Upper Arm, Position: Sitting, Cuff Size: Large Adult)   Pulse 86   Temp 97.1 °F (36.2 °C) (Temporal)   Ht 5' 3\" (1.6 m)   Wt 170 lb (77.1 kg)   LMP 04/27/2022   SpO2 98%   BMI 30.11 kg/m²   Review of Systems   Constitutional: Positive for activity change, appetite change and fatigue. Negative for fever. HENT: Negative for congestion, postnasal drip and tinnitus. Respiratory: Negative for cough, shortness of breath and wheezing. Cardiovascular: Negative for chest pain, palpitations and leg swelling. Gastrointestinal: Negative for abdominal pain, constipation, nausea and vomiting. Genitourinary: Negative for difficulty urinating and menstrual problem. Musculoskeletal: Negative for arthralgias and back pain. Skin: Negative for rash. Neurological: Negative for seizures and numbness. Hematological: Negative for adenopathy. Psychiatric/Behavioral: Negative for behavioral problems, self-injury and sleep disturbance. The patient is not nervous/anxious and is not hyperactive. Physical Exam  Vitals reviewed. Constitutional:       General: She is not in acute distress. Appearance: Normal appearance. She is not ill-appearing. HENT:      Head: Normocephalic. Right Ear: There is no impacted cerumen. Left Ear: There is no impacted cerumen. Nose: No rhinorrhea. Eyes:      General:         Right eye: No discharge. Left eye: No discharge. Cardiovascular:      Rate and Rhythm: Normal rate and regular rhythm. Pulses: Normal pulses. Heart sounds: No murmur heard. Pulmonary:      Effort: Pulmonary effort is normal.      Breath sounds: Normal breath sounds. No wheezing or rhonchi. Skin:     Capillary Refill: Capillary refill takes less than 2 seconds. Findings: No rash. Neurological:      General: No focal deficit present.       Mental Status: She is alert and oriented to person, place, and time. Psychiatric:         Mood and Affect: Mood normal.         Behavior: Behavior normal.                   An electronic signature was used to authenticate this note.     --PATRICIA Edward

## 2022-05-06 NOTE — PATIENT INSTRUCTIONS
Patient Education        Anxiety Disorder: Care Instructions  Your Care Instructions     Anxiety is a normal reaction to stress. Difficult situations can cause you to have symptoms such as sweaty palms and a nervous feeling. In an anxiety disorder, the symptoms are far more severe. Constant worry, muscle tension, trouble sleeping, nausea and diarrhea, and other symptoms can make normal daily activities difficult or impossible. These symptoms may occur for no reason, and they can affect your work, school, or social life. Medicines, counseling, and self-care can all help. Follow-up care is a key part of your treatment and safety. Be sure to make and go to all appointments, and call your doctor if you are having problems. It's also a good idea to know your test results and keep a list of the medicines you take. How can you care for yourself at home? · Take medicines exactly as directed. Call your doctor if you think you are having a problem with your medicine. · Go to your counseling sessions and follow-up appointments. · Recognize and accept your anxiety. Then, when you are in a situation that makes you anxious, say to yourself, \"This is not an emergency. I feel uncomfortable, but I am not in danger. I can keep going even if I feel anxious. \"  · Be kind to your body:  ? Relieve tension with exercise or a massage. ? Get enough rest.  ? Avoid alcohol, caffeine, nicotine, and illegal drugs. They can increase your anxiety level and cause sleep problems. ? Learn and do relaxation techniques. See below for more about these techniques. · Engage your mind. Get out and do something you enjoy. Go to a funny movie, or take a walk or hike. Plan your day. Having too much or too little to do can make you anxious. · Keep a record of your symptoms. Discuss your fears with a good friend or family member, or join a support group for people with similar problems. Talking to others sometimes relieves stress.   · Get involved in social groups, or volunteer to help others. Being alone sometimes makes things seem worse than they are. · Get at least 30 minutes of exercise on most days of the week to relieve stress. Walking is a good choice. You also may want to do other activities, such as running, swimming, cycling, or playing tennis or team sports. Relaxation techniques  Do relaxation exercises 10 to 20 minutes a day. You can play soothing, relaxing music while you do them, if you wish. · Tell others in your house that you are going to do your relaxation exercises. Ask them not to disturb you. · Find a comfortable place, away from all distractions and noise. · Lie down on your back, or sit with your back straight. · Focus on your breathing. Make it slow and steady. · Breathe in through your nose. Breathe out through either your nose or mouth. · Breathe deeply, filling up the area between your navel and your rib cage. Breathe so that your belly goes up and down. · Do not hold your breath. · Breathe like this for 5 to 10 minutes. Notice the feeling of calmness throughout your whole body. As you continue to breathe slowly and deeply, relax by doing the following for another 5 to 10 minutes:  · Tighten and relax each muscle group in your body. You can begin at your toes and work your way up to your head. · Imagine your muscle groups relaxing and becoming heavy. · Empty your mind of all thoughts. · Let yourself relax more and more deeply. · Become aware of the state of calmness that surrounds you. · When your relaxation time is over, you can bring yourself back to alertness by moving your fingers and toes and then your hands and feet and then stretching and moving your entire body. Sometimes people fall asleep during relaxation, but they usually wake up shortly afterward. · Always give yourself time to return to full alertness before you drive a car or do anything that might cause an accident if you are not fully alert.  Never play a relaxation tape while you drive a car. When should you call for help? Call 911 anytime you think you may need emergency care. For example, call if:    · You feel you cannot stop from hurting yourself or someone else. Keep the numbers for these national suicide hotlines: 8-923-639-TALK (8-843.622.5862) and 6-747-SRBRIXQ (8-726.251.8659). If you or someone you know talks about suicide or feeling hopeless, get help right away. Watch closely for changes in your health, and be sure to contact your doctor if:    · You have anxiety or fear that affects your life.     · You have symptoms of anxiety that are new or different from those you had before. Where can you learn more? Go to https://ProQuo.[employer]. org and sign in to your Lyft account. Enter P754 in the [employer] box to learn more about \"Anxiety Disorder: Care Instructions. \"     If you do not have an account, please click on the \"Sign Up Now\" link. Current as of: September 23, 2020               Content Version: 12.9  © 2006-2021 [employer]. Care instructions adapted under license by [facility] ([facility]). If you have questions about a medical condition or this instruction, always ask your healthcare professional. [address] any warranty or liability for your use of this information. Patient Education        Numbness and Tingling: Care Instructions  Your Care Instructions     Many things can cause numbness or tingling. Swelling may put pressure on a nerve. This could cause you to lose feeling or have a pins-and-needles sensation on part of your body. Nerves may be damaged from trauma, toxins, or diseases, such as diabetes or multiple sclerosis (MS). Sometimes, though, thecause is not clear.   If there is no clear reason for your symptoms, and you are not having any other symptoms, your doctor may suggest watching and waiting for a while to see if the numbness or tingling goes away on its own. Your doctor may want you to haveblood or nerve tests to find the cause of your symptoms. Follow-up care is a key part of your treatment and safety. Be sure to make and go to all appointments, and call your doctor if you are having problems. It's also a good idea to know your test results and keep alist of the medicines you take. How can you care for yourself at home?  If your doctor prescribes medicine, take it exactly as directed. Call your doctor if you think you are having a problem with your medicine.  If you have any swelling, put ice or a cold pack on the area for 10 to 20 minutes at a time. Put a thin cloth between the ice and your skin. When should you call for help? Call 911 anytime you think you may need emergency care. For example, call if:     You have weakness, numbness, or tingling in both legs.      You lose bowel or bladder control.      You have symptoms of a stroke. These may include:  ? Sudden numbness, tingling, weakness, or loss of movement in your face, arm, or leg, especially on only one side of your body. ? Sudden vision changes. ? Sudden trouble speaking. ? Sudden confusion or trouble understanding simple statements. ? Sudden problems with walking or balance. ? A sudden, severe headache that is different from past headaches. Watch closely for changes in your health, and be sure to contact your doctor ifyou have any problems, or if:     You do not get better as expected. Where can you learn more? Go to https://DrillsterpedianneZapHour.[employer]. org and sign in to your Telerik account. Enter G286 in the [facility] box to learn more about \"Numbness and Tingling: Care Instructions. \"     If you do not have an account, please click on the \"Sign Up Now\" link. Current as of: December 13, 2021               Content Version: 13.2  © 1048-1394 [employer]. Care instructions adapted under license by [facility] ([facility]).  If you have questions about a medical condition or this instruction, always ask your healthcare professional. [place] [address] any warranty or liability for your use of this information.

## 2022-06-06 ENCOUNTER — OFFICE VISIT (OUTPATIENT)
Dept: PRIMARY CARE CLINIC | Age: 22
End: 2022-06-06
Payer: COMMERCIAL

## 2022-06-06 VITALS
BODY MASS INDEX: 29.41 KG/M2 | TEMPERATURE: 97.8 F | HEART RATE: 74 BPM | DIASTOLIC BLOOD PRESSURE: 72 MMHG | OXYGEN SATURATION: 98 % | WEIGHT: 166 LBS | SYSTOLIC BLOOD PRESSURE: 110 MMHG

## 2022-06-06 DIAGNOSIS — F41.0 PANIC ATTACKS: ICD-10-CM

## 2022-06-06 DIAGNOSIS — G44.039 EPISODIC PAROXYSMAL HEMICRANIA, NOT INTRACTABLE: ICD-10-CM

## 2022-06-06 DIAGNOSIS — M54.2 NECK PAIN: ICD-10-CM

## 2022-06-06 DIAGNOSIS — M54.12 RADICULITIS, CERVICAL: ICD-10-CM

## 2022-06-06 DIAGNOSIS — R53.83 FATIGUE, UNSPECIFIED TYPE: ICD-10-CM

## 2022-06-06 DIAGNOSIS — F33.41 RECURRENT MAJOR DEPRESSIVE DISORDER, IN PARTIAL REMISSION (HCC): Primary | ICD-10-CM

## 2022-06-06 PROCEDURE — 99214 OFFICE O/P EST MOD 30 MIN: CPT | Performed by: NURSE PRACTITIONER

## 2022-06-06 RX ORDER — BUTALBITAL, ACETAMINOPHEN, CAFFEINE AND CODEINE PHOSPHATE 50; 325; 40; 30 MG/1; MG/1; MG/1; MG/1
1 CAPSULE ORAL EVERY 8 HOURS PRN
Qty: 90 CAPSULE | Refills: 0 | Status: SHIPPED | OUTPATIENT
Start: 2022-06-06 | End: 2022-07-19 | Stop reason: SDUPTHER

## 2022-06-06 RX ORDER — BUPROPION HYDROCHLORIDE 150 MG/1
150 TABLET, EXTENDED RELEASE ORAL 2 TIMES DAILY
Qty: 60 TABLET | Refills: 3 | Status: SHIPPED | OUTPATIENT
Start: 2022-06-06 | End: 2022-07-19

## 2022-06-06 ASSESSMENT — PATIENT HEALTH QUESTIONNAIRE - PHQ9
SUM OF ALL RESPONSES TO PHQ9 QUESTIONS 1 & 2: 0
2. FEELING DOWN, DEPRESSED OR HOPELESS: 0
SUM OF ALL RESPONSES TO PHQ QUESTIONS 1-9: 0
9. THOUGHTS THAT YOU WOULD BE BETTER OFF DEAD, OR OF HURTING YOURSELF: 0
6. FEELING BAD ABOUT YOURSELF - OR THAT YOU ARE A FAILURE OR HAVE LET YOURSELF OR YOUR FAMILY DOWN: 0
1. LITTLE INTEREST OR PLEASURE IN DOING THINGS: 0
SUM OF ALL RESPONSES TO PHQ QUESTIONS 1-9: 0
8. MOVING OR SPEAKING SO SLOWLY THAT OTHER PEOPLE COULD HAVE NOTICED. OR THE OPPOSITE, BEING SO FIGETY OR RESTLESS THAT YOU HAVE BEEN MOVING AROUND A LOT MORE THAN USUAL: 0
7. TROUBLE CONCENTRATING ON THINGS, SUCH AS READING THE NEWSPAPER OR WATCHING TELEVISION: 0
3. TROUBLE FALLING OR STAYING ASLEEP: 0
4. FEELING TIRED OR HAVING LITTLE ENERGY: 0
SUM OF ALL RESPONSES TO PHQ QUESTIONS 1-9: 0
10. IF YOU CHECKED OFF ANY PROBLEMS, HOW DIFFICULT HAVE THESE PROBLEMS MADE IT FOR YOU TO DO YOUR WORK, TAKE CARE OF THINGS AT HOME, OR GET ALONG WITH OTHER PEOPLE: 0
5. POOR APPETITE OR OVEREATING: 0
SUM OF ALL RESPONSES TO PHQ QUESTIONS 1-9: 0

## 2022-06-06 ASSESSMENT — ENCOUNTER SYMPTOMS
SHORTNESS OF BREATH: 0
NAUSEA: 0
ABDOMINAL PAIN: 0
CONSTIPATION: 0
BACK PAIN: 0
COUGH: 0
WHEEZING: 0
VOMITING: 0

## 2022-06-06 NOTE — PATIENT INSTRUCTIONS
Patient Education        Neck Pain: Care Instructions  Your Care Instructions     You can have neck pain anywhere from the bottom of your head to the top of your shoulders. It can spread to the upper back or arms. Injuries, painting a ceiling, sleeping with your neck twisted, staying in one position for too long,and many other activities can cause neck pain. Most neck pain gets better with home care. Your doctor may recommend medicine to relieve pain or relax your muscles. He or she may suggest exercise and physical therapy to increase flexibility and relieve stress. You may need towear a special (cervical) collar to support your neck for a day or two. Follow-up care is a key part of your treatment and safety. Be sure to make and go to all appointments, and call your doctor if you are having problems. It's also a good idea to know your test results and keep alist of the medicines you take. How can you care for yourself at home?  Try using a heating pad on a low or medium setting for 15 to 20 minutes every 2 or 3 hours. Try a warm shower in place of one session with the heating pad.  You can also try an ice pack for 10 to 15 minutes every 2 to 3 hours. Put a thin cloth between the ice and your skin.  Take pain medicines exactly as directed. ? If the doctor gave you a prescription medicine for pain, take it as prescribed. ? If you are not taking a prescription pain medicine, ask your doctor if you can take an over-the-counter medicine.  If your doctor recommends a cervical collar, wear it exactly as directed. When should you call for help? Call your doctor now or seek immediate medical care if:     You have new or worsening numbness in your arms, buttocks or legs.      You have new or worsening weakness in your arms or legs. (This could make it hard to stand up.)      You lose control of your bladder or bowels.    Watch closely for changes in your health, and be sure to contact your doctor if:     Your neck pain is getting worse.      You are not getting better after 1 week.      You do not get better as expected. Where can you learn more? Go to [email or web address] and sign in to your [email or web address] account. Enter [id number] in the [facility] box to learn more about \"Neck Pain: Care Instructions. \"     If you do not have an account, please click on the \"Sign Up Now\" link. Current as of: July 1, 2021               Content Version: 13.2  © 2006-2022 [employer]. Care instructions adapted under license by [facility] ([facility]). If you have questions about a medical condition or this instruction, always ask your healthcare professional. [address] any warranty or liability for your use of this information. Patient Education        Depression and Chronic Disease: Care Instructions  Your Care Instructions     A chronic disease is one that you have for a long time. Some chronic diseases can be controlled, but they usually cannot be cured. Depression is common inpeople with chronic diseases, but it often goes unnoticed. Many people have concerns about seeking treatment for a mental health problem. You may think it's a sign of weakness, or you don't want people to know about it. It's important to overcome these reasons for not seeking treatment. Treating depression or anxiety is good for your health. Follow-up care is a key part of your treatment and safety. Be sure to make and go to all appointments, and call your doctor if you are having problems. It's also a good idea to know your test results and keep alist of the medicines you take. How can you care for yourself at home? Watch for symptoms of depression  The symptoms of depression are often subtle at first. You may think they are caused by your disease rather than depression. Or you may think it is normal anneliese depressed when you have a chronic disease.   If you are depressed you may:   Feel sad or hopeless.  Feel guilty or worthless.  Not enjoy the things you used to enjoy.  Feel hopeless, as though life is not worth living.  Have trouble thinking or remembering.  Have low energy, and you may not eat or sleep well.  Pull away from others.  Think often about death or killing yourself. (Keep the numbers for these national suicide hotlines: 8-077-849-TALK [1-280.133.7960] and 4-170-ZDUHFQJ [1-306.463.3558]. )  Get treatment  By treating your depression, you can feel more hopeful and have more energy. If you feel better, you may take better care of yourself, so your health mayimprove.  Talk to your doctor if you have any changes in mood during treatment for your disease.  Ask your doctor for help. Counseling, antidepressant medicine, or a combination of the two can help most people with depression. Often a combination works best. Counseling can also help you cope with having a chronic disease. When should you call for help? Call 911 anytime you think you may need emergency care. For example, call if:     You feel like hurting yourself or someone else.      Someone you know has depression and is about to attempt or is attempting suicide. Call your doctor now or seek immediate medical care if:     You hear voices.      Someone you know has depression and:  ? Starts to give away his or her possessions. ? Uses illegal drugs or drinks alcohol heavily. ? Talks or writes about death, including writing suicide notes or talking about guns, knives, or pills. ? Starts to spend a lot of time alone. ? Acts very aggressively or suddenly appears calm. Watch closely for changes in your health, and be sure to contact your doctor if:     You do not get better as expected. Where can you learn more? Go to https://angelique.[email or web address] org and sign in to your [employer] account.  Enter V258 in the [employer] box to learn more about \"Depression and Chronic Disease: Care Instructions. \"     If you do not have an account, please click on the \"Sign Up Now\" link. Current as of: June 16, 2021               Content Version: 13.2  © 5649-0146 [employer]. Care instructions adapted under license by [facility] ([facility]). If you have questions about a medical condition or this instruction, always ask your healthcare professional. [address] any warranty or liability for your use of this information.

## 2022-06-06 NOTE — PROGRESS NOTES
Jacqui Rockwell (:  2000) is a 19 y.o. female,Established patient, here for evaluation of the following chief complaint(s):  Follow-up (for fatigue and anxiety)      ASSESSMENT/PLAN:    ICD-10-CM    1. Recurrent major depressive disorder, in partial remission (HCC)  F33.41 buPROPion (WELLBUTRIN SR) 150 MG extended release tablet  Will add to effexor xr  One -2 a day  Doing well overall  Try for optimal improvement     2. Panic attacks  F41.0 buPROPion (WELLBUTRIN SR) 150 MG extended release tablet   3. Fatigue, unspecified type  R53.83 buPROPion (WELLBUTRIN SR) 150 MG extended release tablet   4. Episodic paroxysmal hemicrania, not intractable  G44.039 butalbital-acetaminophen-caffeine-codeine (FIORICET WITH CODEINE) -90-30 MG per capsule  Uses as needed  Rare  Will refill at present   Relief with medication  Monitor use       Jay Leal APRN, Pain Medicine, [place]  Consider trigger points and botox for posterior neck pain     5. Neck pain  M54.2 butalbital-acetaminophen-caffeine-codeine (FIORICET WITH CODEINE) -31-30 MG per capsule     Jay Leal APRN, Pain Medicine, [place]   6. Radiculitis, cervical  M54.12 butalbital-acetaminophen-caffeine-codeine (FIORICET WITH CODEINE) -20-30 MG per capsule     Jay Nolen APRN, Pain Medicine, [place]       Return in about 31 days (around 2022) for depression fatigue anxiety and neck pain.     SUBJECTIVE/OBJECTIVE:  HPI    Patient is here for anxiety and fatigue issues  She was suffering with fatigue  Feels tired all the time  Labs unrevealing  She isn't aware if snoring or not    Reports will got to bed around midnight :  Will need to get up 730 am but still feels tired  She reports that at times will name during day    Depression  Has stopped the zoloft  And changed to effexor xr  She reports the depression seems to be\"much better \"  Anxiety has went down a lot but still present  She is motivated but just all the time feel exhausted   Forcing herself to do it      Neck pain  Reports it keeps her up at night at times  With migraines etc  Reports went to [facility] and nothing wrong   But is in pain \"always \"  Using muscle relaxer at night  But reports even with doing arms at gym she feels it pulls        /72 (Site: Right Upper Arm, Position: Sitting, Cuff Size: Large Adult)   Pulse 74   Temp 97.8 °F (36.6 °C) (Temporal)   Wt 166 lb (75.3 kg)   SpO2 98%   BMI 29.41 kg/m²   Review of Systems   Constitutional: Positive for activity change, appetite change and fatigue. Negative for fever. HENT: Negative for congestion, postnasal drip and tinnitus. Respiratory: Negative for cough, shortness of breath and wheezing. Cardiovascular: Negative for chest pain, palpitations and leg swelling. Gastrointestinal: Negative for abdominal pain, constipation, nausea and vomiting. Genitourinary: Negative for difficulty urinating and menstrual problem. Musculoskeletal: Positive for arthralgias (posterior headache), neck pain and neck stiffness. Negative for back pain. Skin: Negative for rash. Neurological: Negative for seizures and numbness. Hematological: Negative for adenopathy. Psychiatric/Behavioral: Negative for behavioral problems, self-injury and sleep disturbance. The patient is not nervous/anxious and is not hyperactive. Physical Exam  Vitals reviewed. Constitutional:       General: She is not in acute distress. Appearance: Normal appearance. She is not ill-appearing. HENT:      Head: Normocephalic. Right Ear: There is no impacted cerumen. Left Ear: There is no impacted cerumen. Nose: No rhinorrhea. Eyes:      General:         Right eye: No discharge. Left eye: No discharge. Cardiovascular:      Rate and Rhythm: Normal rate and regular rhythm. Pulses: Normal pulses. Heart sounds: No murmur heard.       Pulmonary:      Effort: Pulmonary effort is normal. Breath sounds: Normal breath sounds. No wheezing or rhonchi. Musculoskeletal:         General: Tenderness (posterior neck area) present. Skin:     Capillary Refill: Capillary refill takes less than 2 seconds. Findings: No rash. Neurological:      General: No focal deficit present. Mental Status: She is alert and oriented to person, place, and time. Psychiatric:         Mood and Affect: Mood normal.         Behavior: Behavior normal.                   An electronic signature was used to authenticate this note.     --PATRICIA Vogt

## 2022-06-13 ENCOUNTER — HOSPITAL ENCOUNTER (OUTPATIENT)
Dept: PAIN MANAGEMENT | Age: 22
Discharge: HOME | End: 2022-06-13
Payer: COMMERCIAL

## 2022-06-13 VITALS
HEIGHT: 64 IN | BODY MASS INDEX: 28.68 KG/M2 | TEMPERATURE: 97.2 F | SYSTOLIC BLOOD PRESSURE: 119 MMHG | OXYGEN SATURATION: 96 % | RESPIRATION RATE: 16 BRPM | HEART RATE: 103 BPM | WEIGHT: 168 LBS | DIASTOLIC BLOOD PRESSURE: 81 MMHG

## 2022-06-13 DIAGNOSIS — M54.2 CERVICALGIA: ICD-10-CM

## 2022-06-13 DIAGNOSIS — R20.0 RIGHT ARM NUMBNESS: ICD-10-CM

## 2022-06-13 DIAGNOSIS — G43.109 MIGRAINE WITH AURA AND WITHOUT STATUS MIGRAINOSUS, NOT INTRACTABLE: ICD-10-CM

## 2022-06-13 DIAGNOSIS — M62.838 CERVICAL PARASPINAL MUSCLE SPASM: Primary | ICD-10-CM

## 2022-06-13 PROCEDURE — 99214 OFFICE O/P EST MOD 30 MIN: CPT | Performed by: NURSE PRACTITIONER

## 2022-06-13 PROCEDURE — 99215 OFFICE O/P EST HI 40 MIN: CPT

## 2022-06-13 RX ORDER — ONDANSETRON 4 MG/1
4 TABLET, ORALLY DISINTEGRATING ORAL 3 TIMES DAILY PRN
Qty: 21 TABLET | Refills: 2 | Status: SHIPPED | OUTPATIENT
Start: 2022-06-13 | End: 2022-10-18 | Stop reason: SDUPTHER

## 2022-06-13 RX ORDER — TIZANIDINE 4 MG/1
4 TABLET ORAL 3 TIMES DAILY
Qty: 90 TABLET | Refills: 2 | Status: SHIPPED | OUTPATIENT
Start: 2022-06-13 | End: 2022-07-19

## 2022-06-13 RX ORDER — AMITRIPTYLINE HYDROCHLORIDE 75 MG/1
75 TABLET, FILM COATED ORAL NIGHTLY
Qty: 30 TABLET | Refills: 2 | Status: SHIPPED | OUTPATIENT
Start: 2022-06-13 | End: 2022-07-19 | Stop reason: SDUPTHER

## 2022-06-13 ASSESSMENT — PAIN DESCRIPTION - LOCATION: LOCATION: NECK

## 2022-06-13 ASSESSMENT — PAIN SCALES - GENERAL: PAINLEVEL_OUTOF10: 7

## 2022-06-13 ASSESSMENT — PAIN DESCRIPTION - PAIN TYPE: TYPE: CHRONIC PAIN

## 2022-06-13 ASSESSMENT — PAIN DESCRIPTION - ORIENTATION: ORIENTATION: LOWER

## 2022-06-13 NOTE — PROGRESS NOTES
Clinic Documentation      Education Provided:  [x] Review of Norma Embs  [] Agreement Review  [x] PEG Score Calculated [x] PHQ Score Calculated [x] ORT Score Calculated    [] Compliance Issues Discussed [] Cognitive Behavior Needs [x] Exercise [] Review of Test [] Financial Issues  [x] Tobacco/Alcohol Use Reviewed [x] Teaching [x] New Patient [x] Picture Obtained    Physician Plan:  [] Outgoing Referral  [] Pharmacy Consult  [] Test Ordered [x] Prescription Ordered/Changed   [] Obtained Test Results / Consult Notes        Complete if patient is withholding blood thinner for procedure     Blood Thinner Patient is currently taking:      [] Plavix (Hold for 7 days)  [] Aspirin (Hold for 5 days)     [] Pletal (Hold for 2 days)  [] Pradaxa (Hold for 3 days)    [] Effient (Hold for 7 days)  [] Xarelto (Hold for 2 days)    [] Eliquis (Hold for 2 days)  [] Brilinta (Hold for 7 days)    [] Coumadin (Hold for 5 days) - (INR needs to be drawn the day prior to procedure- INR < 2.0)    [] Aggrenox (Hold for 7 days)        [] Patient will stop medication on their own.    [] Blood Thinner Form Faxed for approval to hold.    Provider form faxed to:     Assessment Completed by:  Electronically signed by Wilma Carroll RN on 6/13/2022 at 3:42 PM

## 2022-06-13 NOTE — H&P
[facility]    History and Physical    Patient Name: Marina Hernandez    MR #: [id number]    Account [de-identified]    : 2000    Age: 19 y.o. Sex: female    Date: 2022    PCP: PATRICIA Tenorio    Referring Provider: PCP    Chief Complaint:   Chief Complaint   Patient presents with    Neck Pain       History of Present Illness: The patient is a 19 y.o. female who was referrred with primary complaints of neck pain    Started developing neck pain approximately 2 years ago. Patient does not recall any specific type of injury. Ever patient does remember she started doing [employer]Fit and after that is when this started. Patient states that the pain starts in her neck she looks down a lot will radiate down into the right arm into the biceps, the forearm and to the hand. However pain is mainly located in her neck. Patient states that she has a daily headache and many times the headache is started behind the right eye and there are many times that she will vomit prior to the start of the headache. Patient has been taking nausea medication daily. Patient has complaints of being dizzy and she will have to sit or she will fall down. Patient does have occasional double vision. Patient has tried chiropractic, physical therapy which included dry needling this is all helped with minor relief. Patient has been seen by neurosurgery at [place] [id number] who ordered MRI of the brain along with nerve conduction study. EMG/NVC not completed due to cost.     Patient was seen by orthopedic [place] for a second opinion. Physical exam did not reveal in deficits that matched patient's complaints. MRI did not reveal any new findings. However, EMG nerve conduction study was recommended and was ordered to be completed at [facility].          MRI of the brain completed on 3/2/2022 was relatively unremarkable with exception of mild chronic paranasal sinus disease    EMG/NVC was completed on 2022 which was unremarkable    Neck Pain   This is a chronic problem. The current episode started more than 1 year ago. The problem occurs constantly. The problem has been waxing and waning. The pain is associated with nothing. The pain is present in the midline. The quality of the pain is described as aching. The symptoms are aggravated by twisting and bending. Associated symptoms include numbness. Pertinent negatives include no weakness. Patient has failed medications: Narcotics: none ; Anticonvulsants: gabapentin (Neurontin); Antidepressants:  Sertraline (ZOLOFT); Muscle Relaxants: none, NSAIDs: naproxen (Naprosyn), and Topicals: none.        Past Imaging  MRI brain (with and without contrast)  3/2/2022  MRI cervical spine (without contrast)  @ [facility]  EMG/NVC 2022 - unremarkable    Screening Tools:     PE    Past PEG: NA    ORT: 2    PHQ-9: 17    Current Pain Assessment  Pain Assessment  Pain Assessment: 0-10  Pain Level: 7  Pain Location: Neck  Pain Orientation: Lower  Pain Type: Chronic pain    Employment: Teacher    Past Medical History  Past Medical History:   Diagnosis Date    Anxiety     Chronic pain     neck    Depression        Allergies  Biaxin [clarithromycin] and Sulfa antibiotics    Current Medications  Current Outpatient Medications   Medication Sig Dispense Refill    ondansetron (ZOFRAN-ODT) 4 MG disintegrating tablet Take 1 tablet by mouth 3 times daily as needed for Nausea or Vomiting 21 tablet 2    amitriptyline (ELAVIL) 75 MG tablet Take 1 tablet by mouth nightly 30 tablet 2    tiZANidine (ZANAFLEX) 4 MG tablet Take 1 tablet by mouth 3 times daily 90 tablet 2    buPROPion (WELLBUTRIN SR) 150 MG extended release tablet Take 1 tablet by mouth 2 times daily 60 tablet 3    venlafaxine (EFFEXOR XR) 75 MG extended release capsule Take 1 capsule by mouth daily 30 capsule 11    tiZANidine (ZANAFLEX) 4 MG tablet Take 1 tablet by mouth every 8 hours as needed ( to  tid prn) 60 tablet 1    busPIRone (BUSPAR) 10 MG tablet Take 10 mg by mouth 2 times daily      Norgestim-Eth Estrad Triphasic (TRI-LO-DYLLAN PO) Take 1 each by mouth daily       No current facility-administered medications for this encounter. Social History    Social History     Socioeconomic History    Marital status: Single     Spouse name: None    Number of children: 0    Years of education: None    Highest education level: None   Occupational History    None   Tobacco Use    Smoking status: Never Smoker    Smokeless tobacco: Never Used   Vaping Use    Vaping Use: Never used   Substance and Sexual Activity    Alcohol use: Never    Drug use: Never    Sexual activity: None   Other Topics Concern    None   Social History Narrative    None     Social Determinants of Health     Financial Resource Strain: Low Risk     Difficulty of Paying Living Expenses: Not hard at all   Food Insecurity: No Food Insecurity    Worried About Running Out of Food in the Last Year: Never true    [address] in the Last Year: Never true   Transportation Needs:     Lack of Transportation (Medical): Not on file    Lack of Transportation (Non-Medical):  Not on file   Physical Activity: Sufficiently Active    Days of Exercise per Week: 5 days    Minutes of Exercise per Session: 50 min   Stress:     Feeling of Stress : Not on file   Social Connections:     Frequency of Communication with Friends and Family: Not on file    Frequency of Social Gatherings with Friends and Family: Not on file    Attends Mandaen Services: Not on file    Active Member of Clubs or Organizations: Not on file    Attends Club or Organization Meetings: Not on file    Marital Status: Not on file   Intimate Partner Violence: Not At Risk    Fear of Current or Ex-Partner: No    Emotionally Abused: No    Physically Abused: No    Sexually Abused: No   Housing Stability:     Unable to Pay for Housing in the Last Year: Not on file    Number of Places Lived in the Last Year: Not on file    Unstable Housing in the Last Year: Not on file         Family History  family history includes Diabetes in her father; High Blood Pressure in her father. Review of Systems:  Review of Systems   Constitutional: Positive for activity change. Gastrointestinal: Negative for constipation. Musculoskeletal: Positive for arthralgias, myalgias, neck pain and neck stiffness. Negative for back pain. Neurological: Positive for numbness. Negative for weakness. Psychiatric/Behavioral: Positive for sleep disturbance. Negative for agitation, self-injury and suicidal ideas. The patient is not nervous/anxious. 14 point ROS negative besides that noted in HPI    Physical exam:     Vitals:    06/13/22 1417   BP: 119/81   Pulse: (!) 103   Resp: 16   Temp: 97.2 °F (36.2 °C)   TempSrc: Temporal   SpO2: 96%   Weight: 168 lb (76.2 kg)   Height: 5' 4\" (1.626 m)       Body mass index is 28.84 kg/m². Physical Exam  Vitals and nursing note reviewed. Constitutional:       General: She is not in acute distress. Appearance: She is well-developed. HENT:      Head: Normocephalic. Right Ear: External ear normal.      Left Ear: External ear normal.      Nose: Nose normal.   Eyes:      Conjunctiva/sclera: Conjunctivae normal.      Pupils: Pupils are equal, round, and reactive to light. Neck:      Vascular: No JVD. Trachea: No tracheal deviation. Cardiovascular:      Rate and Rhythm: Normal rate. Pulmonary:      Effort: Pulmonary effort is normal.   Abdominal:      General: There is no distension. Tenderness: There is no abdominal tenderness. Musculoskeletal:      Cervical back: Spasms (TTP with tender palpable knots identified ie trigger points), tenderness and bony tenderness present. Pain with movement present. Skin:     General: Skin is warm and dry. Neurological:      Mental Status: She is alert and oriented to person, place, and time. Cranial Nerves: No cranial nerve deficit. Psychiatric:         Behavior: Behavior normal.         Thought Content: Thought content normal.         Judgment: Judgment normal.         Labs:     Lab Results   Component Value Date/Time     05/06/2022 04:05 PM    K 4.2 05/06/2022 04:05 PM     05/06/2022 04:05 PM    CO2 21 05/06/2022 04:05 PM    BUN 9 05/06/2022 04:05 PM    CREATININE 0.6 05/06/2022 04:05 PM    GLUCOSE 83 05/06/2022 04:05 PM    CALCIUM 10.0 05/06/2022 04:05 PM        Lab Results   Component Value Date    WBC 10.5 05/06/2022    HGB 13.0 05/06/2022    HCT 42.0 05/06/2022    MCV 91.5 05/06/2022     05/06/2022       Assessment:                                                                                                                                        Active Problems:    Cervical myofascial pain syndrome    Cervicalgia    Right arm numbness    Migraine with aura and without status migrainosus, not intractable  Resolved Problems:    * No resolved hospital problems. *      PLAN:    1. Cervical paraspinal muscle spasm  - ondansetron (ZOFRAN-ODT) 4 MG disintegrating tablet; Take 1 tablet by mouth 3 times daily as needed for Nausea or Vomiting  Dispense: 21 tablet; Refill: 2  - amitriptyline (ELAVIL) 75 MG tablet; Take 1 tablet by mouth nightly  Dispense: 30 tablet; Refill: 2  - tiZANidine (ZANAFLEX) 4 MG tablet; Take 1 tablet by mouth 3 times daily  Dispense: 90 tablet; Refill: 2    2. Cervicalgia    3. Right arm numbness    4. Migraine with aura and without status migrainosus, not intractable     Bilateral cervical trigger points to be completed with NP due to myofascial pain    Patient will be started on amitriptyline for muscle pain and headaches and sleep disturbance. Patient will be given Zofran as she has nausea prior to headaches.   Patient encouraged to take Zanaflex at least twice daily encouraged to break into fourths if needed and to take with food to prevent oversedation. [x] Follow up    [] 4 weeks   [x] 6-8 weeks   [] 10-12 weeks   [] 3 months  [x] Post procedure to evaluate effectiveness of treatment  [] To evaluate medications changes made at office visit. [] To review diagnostics ordered at last visit. [] To evaluate response to therapy    [] For management of controlled substance  [] Random UDS as indicated by ORT score or if indicated by abberent behaviors   [x] Random yearly UDS per office non-controlled substance agreement. Discussion: Discussed exam findings, reviewed imaging yes - , and gave education regarding pathophysiology, exercise, and treatment options yes - , and reviewed plan of care with patient. Strongly reinforced that exercise is exteremly important part of pain management. Exercises should be completed upon awaking and before bed. Patient agreed with POC and questions were asked and answered. See DC instructions. Activity: discussed exercise as beneficial to pain reduction, encouraged stretching exercise at least twice daily with a focus on torso (core) strengthening. Goal:  Pain Management Goals of Therapy:   [] Resolution in pain  [x] Decrease in pain level  [x] Improvement in ADL's  [x] Increase in activities with less pain  [] Decrease in medication     Controlled substance monitoring:    [] Discussed medication side effects, risk of tolerance and/or dependence, and/or alternative treatment  [] Discussed the detrimental effects of long term narcotic use in younger patients especially women of childbearing years.   [] No signs and symptoms of potential drug abuse or diversion were identified  [] Signs of potential drug abuse or diversion were identified   [] ORT Score   [] UDS non-compliant   [] See Note  [] Random urine drug screen sent today  [] Random urine drug screen not completed today   [] Deferred New Patient  [] Compliant   [] Not Compliant see note  [] Medication agreement with provider signed today  [] Medication agreement with provider on file under media   [] Medication regimen effective with no c/o side effects and continued   [] New patient continuing current medication while developing POC   [] On going assessment and evaluation of medication regimen  [] Medication regimen not effective see plan for changes  [] Mila Call reviewed & on file under media     CC:  Jorge A Lyons, [address], APRN - CNP, 7/13/2022 at 10:37 PM    EMR dragon/transcription disclaimer: Much of this encounter note is electronic transcription/translation of spoken language to printed tach. Electronic translation of spoken language may be erroneous, or at times, nonsensical words or phrases may be inadvertently transcribed.  Although, I have reviewed the note for such errors, some may still exist.

## 2022-06-29 ENCOUNTER — HOSPITAL ENCOUNTER (OUTPATIENT)
Dept: PAIN MANAGEMENT | Age: 22
Discharge: HOME | End: 2022-06-29
Payer: COMMERCIAL

## 2022-06-29 VITALS
DIASTOLIC BLOOD PRESSURE: 64 MMHG | TEMPERATURE: 98.6 F | SYSTOLIC BLOOD PRESSURE: 116 MMHG | OXYGEN SATURATION: 97 % | HEART RATE: 89 BPM | RESPIRATION RATE: 16 BRPM

## 2022-06-29 DIAGNOSIS — M79.18 CERVICAL MYOFASCIAL PAIN SYNDROME: ICD-10-CM

## 2022-06-29 PROCEDURE — 20553 NJX 1/MLT TRIGGER POINTS 3/>: CPT | Performed by: NURSE PRACTITIONER

## 2022-06-29 PROCEDURE — 2500000003 HC RX 250 WO HCPCS

## 2022-06-29 PROCEDURE — 20553 NJX 1/MLT TRIGGER POINTS 3/>: CPT

## 2022-06-29 RX ORDER — LIDOCAINE HYDROCHLORIDE 10 MG/ML
10 INJECTION, SOLUTION EPIDURAL; INFILTRATION; INTRACAUDAL; PERINEURAL ONCE
Status: DISCONTINUED | OUTPATIENT
Start: 2022-06-29 | End: 2022-07-01 | Stop reason: HOSPADM

## 2022-06-29 RX ORDER — BUPIVACAINE HYDROCHLORIDE 5 MG/ML
10 INJECTION, SOLUTION EPIDURAL; INTRACAUDAL ONCE
Status: DISCONTINUED | OUTPATIENT
Start: 2022-06-29 | End: 2022-07-01 | Stop reason: HOSPADM

## 2022-06-29 NOTE — PROCEDURES
[facility]    Patient Name: Lorenza Galindo    : 2000                    Age: 19 y.o. Sex: female    Date: 2022    Pre-op Diagnosis: Myofascial Pain/ Muscle Spasms/ Cervicalgia    Post-op Diagnosis: Myofascial Pain/ Muscle Spasms/ Cervicalgia    Procedure: Cervical Trigger Point Injections    Performing Procedure: MEME Barksdale - BC, [facility]    Patient Vitals for the past 24 hrs:   BP Temp Temp src Pulse Resp SpO2   22 1107 116/64 98.6 °F (37 °C) Temporal 89 16 97 %       Description of Procedure:    After a brief physical assessment and failure to improve with conservative measures the patient presented for Cervical Trigger Point Injections The indications, limitations and possible complications were discussed with the patient and the patient elected to proceed with the procedure. After voluntary, informed and signed consent obtained the patient was placed in a seated position. Appropriate time out was obtained per policy. The area of maximal tenderness was palpated over the   bilaterally Cervical muscles - Splenius  Trapezius  Rhomboid The skin overlying these areas was marked with a skin marker. The skin overlying the proposed injection sites were then sprayed with Gebauer's Solution while protecting patient eyes. The areas were prepped using aseptic technique with CHG prep. Each trigger point of the Cervical muscles - Splenius  Trapezius  Rhomboid was injected with approximately 1-2 ml of a solution of 5 ml of 1% Lidocaine Plain and 5 ml of 0.5% Marcaine Plain after negative aspiration    Discharge: The patient tolerated the procedure well. There were no complications during the procedure and the patient was discharged home with discharge instructions. The patient has been instructed to contact the office should there be any complications or questions to arise between today and their next appointment.     Plan:    Patient to follow up in the office in about 6 weeks.      PATRICIA Adam - CNP, 6/29/2022 at 12:54 PM

## 2022-07-06 ENCOUNTER — TELEPHONE (OUTPATIENT)
Dept: PAIN MANAGEMENT | Age: 22
End: 2022-07-06

## 2022-07-06 ENCOUNTER — TELEPHONE (OUTPATIENT)
Dept: PRIMARY CARE CLINIC | Age: 22
End: 2022-07-06

## 2022-07-06 NOTE — TELEPHONE ENCOUNTER
Ice/heat, antiinflammatories. If problems continue will need evaluation. If no appointments can go to urgent care.

## 2022-07-13 PROBLEM — R20.0 RIGHT ARM NUMBNESS: Status: ACTIVE | Noted: 2022-07-13

## 2022-07-13 PROBLEM — M54.2 CERVICALGIA: Status: ACTIVE | Noted: 2022-07-13

## 2022-07-13 PROBLEM — G43.109 MIGRAINE WITH AURA AND WITHOUT STATUS MIGRAINOSUS, NOT INTRACTABLE: Status: ACTIVE | Noted: 2022-07-13

## 2022-07-13 ASSESSMENT — ENCOUNTER SYMPTOMS
BACK PAIN: 0
CONSTIPATION: 0

## 2022-07-19 ENCOUNTER — OFFICE VISIT (OUTPATIENT)
Dept: PRIMARY CARE CLINIC | Age: 22
End: 2022-07-19
Payer: COMMERCIAL

## 2022-07-19 VITALS
DIASTOLIC BLOOD PRESSURE: 79 MMHG | BODY MASS INDEX: 29.2 KG/M2 | HEART RATE: 87 BPM | SYSTOLIC BLOOD PRESSURE: 117 MMHG | TEMPERATURE: 97.9 F | WEIGHT: 170.13 LBS | OXYGEN SATURATION: 99 %

## 2022-07-19 DIAGNOSIS — F41.1 GAD (GENERALIZED ANXIETY DISORDER): ICD-10-CM

## 2022-07-19 DIAGNOSIS — G43.109 MIGRAINE WITH AURA AND WITHOUT STATUS MIGRAINOSUS, NOT INTRACTABLE: Primary | ICD-10-CM

## 2022-07-19 DIAGNOSIS — F33.0 MILD EPISODE OF RECURRENT MAJOR DEPRESSIVE DISORDER (HCC): ICD-10-CM

## 2022-07-19 DIAGNOSIS — M62.838 CERVICAL PARASPINAL MUSCLE SPASM: ICD-10-CM

## 2022-07-19 DIAGNOSIS — M54.2 NECK PAIN: ICD-10-CM

## 2022-07-19 DIAGNOSIS — G44.039 EPISODIC PAROXYSMAL HEMICRANIA, NOT INTRACTABLE: ICD-10-CM

## 2022-07-19 DIAGNOSIS — M54.12 RADICULITIS, CERVICAL: ICD-10-CM

## 2022-07-19 PROCEDURE — 99214 OFFICE O/P EST MOD 30 MIN: CPT | Performed by: NURSE PRACTITIONER

## 2022-07-19 RX ORDER — VENLAFAXINE HYDROCHLORIDE 75 MG/1
75 CAPSULE, EXTENDED RELEASE ORAL DAILY
Qty: 30 CAPSULE | Refills: 11 | Status: SHIPPED | OUTPATIENT
Start: 2022-07-19 | End: 2022-08-23 | Stop reason: SDUPTHER

## 2022-07-19 RX ORDER — BACLOFEN 10 MG/1
10 TABLET ORAL 3 TIMES DAILY
Qty: 90 TABLET | Refills: 0 | Status: SHIPPED | OUTPATIENT
Start: 2022-07-19 | End: 2022-08-15

## 2022-07-19 RX ORDER — BUTALBITAL, ACETAMINOPHEN, CAFFEINE AND CODEINE PHOSPHATE 50; 325; 40; 30 MG/1; MG/1; MG/1; MG/1
1 CAPSULE ORAL EVERY 8 HOURS PRN
Qty: 60 CAPSULE | Refills: 0 | Status: SHIPPED | OUTPATIENT
Start: 2022-07-19 | End: 2022-08-23 | Stop reason: SDUPTHER

## 2022-07-19 RX ORDER — AMITRIPTYLINE HYDROCHLORIDE 75 MG/1
75 TABLET, FILM COATED ORAL NIGHTLY
Qty: 30 TABLET | Refills: 2 | Status: SHIPPED | OUTPATIENT
Start: 2022-07-19 | End: 2022-08-23 | Stop reason: SDUPTHER

## 2022-07-19 ASSESSMENT — ENCOUNTER SYMPTOMS
CONSTIPATION: 0
VOMITING: 0
WHEEZING: 0
COUGH: 0
BACK PAIN: 0
SHORTNESS OF BREATH: 0
ABDOMINAL PAIN: 0
NAUSEA: 0

## 2022-07-19 NOTE — PROGRESS NOTES
Regina Martines (:  2000) is a 19 y.o. female,Established patient, here for evaluation of the following chief complaint(s):  Follow-up (Depression and anxiety follow up. Pain management took her off of what provider originally proscribed. Gave her trial of ubrelvy. )      ASSESSMENT/PLAN:    ICD-10-CM    1. Migraine with aura and without status migrainosus, not intractable  G43.109 baclofen (LIORESAL) 10 MG tablet  Due to spasms : try half -1 baclofen instead or replace of the zanaflex   As the migraine related to muscle spasms  When severe use esgic with codeine : less than 2 days a week         2. WILLIAM (generalized anxiety disorder)  F41.1 venlafaxine (EFFEXOR XR) 75 MG extended release capsule  Had to return  Due to \"not well \"  Will need to change back   She has restarted  This ok to take with elavil  Monitor for symptoms of seratonin symptom          3. Mild episode of recurrent major depressive disorder (HCC)  F33.0 venlafaxine (EFFEXOR XR) 75 MG extended release capsule      4. Cervical paraspinal muscle spasm  M62.838 amitriptyline (ELAVIL) 75 MG tablet     baclofen (LIORESAL) 10 MG tablet  Instead of zanaflex  If severe may consider low dose benzo    Use tens unit   Consider second injections            Return in about 1 month (around 2022) for depression migraines neck pain.     SUBJECTIVE/OBJECTIVE:  HPI  Patient is here for follow up       Patient is here to discuss neck pain  Reports she has went to pain management  She went twice   1st time evaluation : was stopped on the wellbutrin  And wanted to do trial of ubrelvy  She gradually went off :  Every 2 days then weaned down  on effexor   She got really anxious  So restarted this       A few weeks later  Was given trigger injections that didn't do well  With the initial  She had accident and fell on her back to hit head   And back to square one   She reports the trigger did give her some relief   Till this incident    She reports elavil at night  Seems to be helping   She is taking 75mg nightly  Sleeping with relief    Migraines  She tried the nurtec with no relief  But she notes throwing up in pain  Due to headache   Reports posterior headache and radiates up left side to behind eye  She reports nsaids no relief   Only relief with fioricet with codeine  But when severe has to : take it           /79 (Site: Right Upper Arm, Position: Sitting, Cuff Size: Large Adult)   Pulse 87   Temp 97.9 °F (36.6 °C) (Temporal)   Wt 170 lb 2 oz (77.2 kg)   LMP 07/11/2022 (Exact Date)   SpO2 99%   BMI 29.20 kg/m²   Review of Systems   Constitutional:  Positive for activity change, appetite change and fatigue. Negative for fever. HENT:  Negative for congestion, postnasal drip and tinnitus. Respiratory:  Negative for cough, shortness of breath and wheezing. Cardiovascular:  Negative for chest pain, palpitations and leg swelling. Gastrointestinal:  Negative for abdominal pain, constipation, nausea and vomiting. Genitourinary:  Negative for difficulty urinating and menstrual problem. Musculoskeletal:  Positive for arthralgias (posterior headache), neck pain and neck stiffness. Negative for back pain. Skin:  Negative for rash. Neurological:  Negative for seizures and numbness. Hematological:  Negative for adenopathy. Psychiatric/Behavioral:  Negative for behavioral problems, self-injury and sleep disturbance. The patient is not nervous/anxious and is not hyperactive. Physical Exam  Vitals reviewed. Constitutional:       General: She is not in acute distress. Appearance: Normal appearance. She is not ill-appearing. HENT:      Head: Normocephalic. Right Ear: There is no impacted cerumen. Left Ear: There is no impacted cerumen. Nose: No rhinorrhea. Eyes:      General:         Right eye: No discharge. Left eye: No discharge. Cardiovascular:      Rate and Rhythm: Normal rate and regular rhythm.

## 2022-08-15 DIAGNOSIS — M62.838 CERVICAL PARASPINAL MUSCLE SPASM: ICD-10-CM

## 2022-08-15 DIAGNOSIS — G43.109 MIGRAINE WITH AURA AND WITHOUT STATUS MIGRAINOSUS, NOT INTRACTABLE: ICD-10-CM

## 2022-08-15 RX ORDER — BACLOFEN 10 MG/1
10 TABLET ORAL 3 TIMES DAILY
Qty: 90 TABLET | Refills: 0 | Status: SHIPPED | OUTPATIENT
Start: 2022-08-15 | End: 2022-08-23

## 2022-08-15 NOTE — TELEPHONE ENCOUNTER
Received fax from pharmacy requesting refill on pts medication(s). Pt was last seen in office on 7/19/2022  and has a follow up scheduled for 8/23/2022. Will send request to  Ashlyn Orellana  for patient. Requested Prescriptions     Pending Prescriptions Disp Refills    baclofen (LIORESAL) 10 MG tablet [Pharmacy Med Name: Baclofen 10 MG Oral Tablet] 90 tablet 0     Sig: Take 1 tablet by mouth in the morning and 1 tablet at noon and 1 tablet before bedtime.

## 2022-08-23 ENCOUNTER — OFFICE VISIT (OUTPATIENT)
Dept: PRIMARY CARE CLINIC | Age: 22
End: 2022-08-23
Payer: COMMERCIAL

## 2022-08-23 VITALS
HEART RATE: 107 BPM | WEIGHT: 173 LBS | BODY MASS INDEX: 29.7 KG/M2 | OXYGEN SATURATION: 98 % | SYSTOLIC BLOOD PRESSURE: 107 MMHG | TEMPERATURE: 98.6 F | DIASTOLIC BLOOD PRESSURE: 73 MMHG

## 2022-08-23 DIAGNOSIS — M54.12 RADICULITIS, CERVICAL: ICD-10-CM

## 2022-08-23 DIAGNOSIS — F33.0 MILD EPISODE OF RECURRENT MAJOR DEPRESSIVE DISORDER (HCC): ICD-10-CM

## 2022-08-23 DIAGNOSIS — G43.109 MIGRAINE WITH AURA AND WITHOUT STATUS MIGRAINOSUS, NOT INTRACTABLE: ICD-10-CM

## 2022-08-23 DIAGNOSIS — M79.18 CERVICAL MYOFASCIAL PAIN SYNDROME: ICD-10-CM

## 2022-08-23 DIAGNOSIS — N92.6 IRREGULAR PERIODS: ICD-10-CM

## 2022-08-23 DIAGNOSIS — G44.039 EPISODIC PAROXYSMAL HEMICRANIA, NOT INTRACTABLE: ICD-10-CM

## 2022-08-23 DIAGNOSIS — F41.1 GAD (GENERALIZED ANXIETY DISORDER): Primary | ICD-10-CM

## 2022-08-23 DIAGNOSIS — M62.838 CERVICAL PARASPINAL MUSCLE SPASM: ICD-10-CM

## 2022-08-23 DIAGNOSIS — M54.2 NECK PAIN: ICD-10-CM

## 2022-08-23 PROCEDURE — 99213 OFFICE O/P EST LOW 20 MIN: CPT | Performed by: NURSE PRACTITIONER

## 2022-08-23 RX ORDER — VENLAFAXINE HYDROCHLORIDE 75 MG/1
75 CAPSULE, EXTENDED RELEASE ORAL DAILY
Qty: 30 CAPSULE | Refills: 11 | Status: SHIPPED | OUTPATIENT
Start: 2022-08-23

## 2022-08-23 RX ORDER — BUTALBITAL, ACETAMINOPHEN, CAFFEINE AND CODEINE PHOSPHATE 50; 325; 40; 30 MG/1; MG/1; MG/1; MG/1
1 CAPSULE ORAL EVERY 8 HOURS PRN
Qty: 60 CAPSULE | Refills: 0 | Status: SHIPPED | OUTPATIENT
Start: 2022-08-23 | End: 2022-10-03 | Stop reason: SDUPTHER

## 2022-08-23 RX ORDER — TIZANIDINE 4 MG/1
4 TABLET ORAL EVERY 8 HOURS PRN
Qty: 60 TABLET | Refills: 1 | Status: SHIPPED | OUTPATIENT
Start: 2022-08-23

## 2022-08-23 RX ORDER — AMITRIPTYLINE HYDROCHLORIDE 75 MG/1
75 TABLET, FILM COATED ORAL NIGHTLY
Qty: 30 TABLET | Refills: 2 | Status: SHIPPED | OUTPATIENT
Start: 2022-08-23

## 2022-08-23 RX ORDER — UBROGEPANT 100 MG/1
1 TABLET ORAL DAILY PRN
Qty: 10 TABLET | Refills: 11 | Status: SHIPPED | OUTPATIENT
Start: 2022-08-23

## 2022-08-23 RX ORDER — NORGESTIMATE AND ETHINYL ESTRADIOL 7DAYSX3 LO
1 KIT ORAL DAILY
Qty: 3 PACKET | Refills: 3 | Status: SHIPPED | OUTPATIENT
Start: 2022-08-23

## 2022-08-23 ASSESSMENT — ENCOUNTER SYMPTOMS
ABDOMINAL PAIN: 0
CONSTIPATION: 0
COUGH: 0
BACK PAIN: 0
WHEEZING: 0
SHORTNESS OF BREATH: 0
VOMITING: 0
NAUSEA: 0

## 2022-08-23 NOTE — PROGRESS NOTES
Jyothi Nathan (:  2000) is a 19 y.o. female,Established patient, here for evaluation of the following chief complaint(s):  Follow-up (1 month follow up. /Depression is doing a lot better. Neck pain has gotten worse./Last 3 months having 2 periods a month. [employer] giving a different brand of birth control every times she picks up prescrip.)      ASSESSMENT/PLAN:    ICD-10-CM    1. WILLIAM (generalized anxiety disorder)  F41.1 venlafaxine (EFFEXOR XR) 75 MG extended release capsule  Cont present doing well  Cont and monitor depression        2. Irregular periods  N92.6 Norgestim-Eth Estrad Triphasic (TRI-LO-DYLLAN) 0.18/0.215/0.25 MG-25 MCG TABS  Will do 3 months        3. Cervical myofascial pain syndrome  M79.18 butalbital-acetaminophen-caffeine-codeine (FIORICET WITH CODEINE) -76-30 MG per capsule  Trial ubrelevy 100mg at onset        4. Migraine with aura and without status migrainosus, not intractable  G43.109 butalbital-acetaminophen-caffeine-codeine (FIORICET WITH CODEINE) -41-30 MG per capsule     Ubrogepant (UBRELVY) 100 MG TABS      5. Mild episode of recurrent major depressive disorder (HCC)  F33.0 venlafaxine (EFFEXOR XR) 75 MG extended release capsule  Doing well        6. Cervical paraspinal muscle spasm  M62.838 amitriptyline (ELAVIL) 75 MG tablet      7. Episodic paroxysmal hemicrania, not intractable  G44.039 butalbital-acetaminophen-caffeine-codeine (FIORICET WITH CODEINE) -28-30 MG per capsule      8. Neck pain  M54.2 butalbital-acetaminophen-caffeine-codeine (FIORICET WITH CODEINE) -50-30 MG per capsule      9. Radiculitis, cervical  M54.12 butalbital-acetaminophen-caffeine-codeine (FIORICET WITH CODEINE) -63-30 MG per capsule          Return in about 6 weeks (around 10/4/2022) for migraines neck pain follow up.     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    Depression  Stared Effexor 75mg daily  Doing well  Sleeping off and on  But her neck interferes with this  Wellbutrin in am  ( but stopped it )      Neck pain :  Reports had to cancel apt last week for injection  So waiting on return for another injection  Reports the neck worse at work  She tried just the motrin  At times requires esgic plus codeine  She reports was worse without it on withdrawal   She was in severe pain    Using zanaflex instead if balofen  Taking half if needed  Elavil at 75mg nightly  For migraines has seen some improvement in migraines  With changes and stress  She reports she got ubrelevy samples   Reports that if can work        Irregular periods  She reports every time gets a different supplier         /73 (Site: Right Upper Arm, Position: Sitting, Cuff Size: Large Adult)   Pulse (!) 107   Temp 98.6 °F (37 °C) (Temporal)   Wt 173 lb (78.5 kg)   LMP 08/22/2022 (Exact Date)   SpO2 98%   BMI 29.70 kg/m²   Review of Systems   Constitutional:  Positive for activity change, appetite change and fatigue. Negative for fever. HENT:  Negative for congestion, postnasal drip and tinnitus. Respiratory:  Negative for cough, shortness of breath and wheezing. Cardiovascular:  Negative for chest pain, palpitations and leg swelling. Gastrointestinal:  Negative for abdominal pain, constipation, nausea and vomiting. Genitourinary:  Negative for difficulty urinating and menstrual problem. Musculoskeletal:  Positive for arthralgias (posterior headache), neck pain and neck stiffness. Negative for back pain. Skin:  Negative for rash. Neurological:  Negative for seizures and numbness. Hematological:  Negative for adenopathy. Psychiatric/Behavioral:  Negative for behavioral problems, self-injury and sleep disturbance. The patient is not nervous/anxious and is not hyperactive. Physical Exam  Vitals reviewed. Constitutional:       General: She is not in acute distress. Appearance: Normal appearance. She is not ill-appearing. HENT:      Head: Normocephalic. Right Ear: There is no impacted cerumen. Left Ear: There is no impacted cerumen. Nose: No rhinorrhea. Eyes:      General:         Right eye: No discharge. Left eye: No discharge. Cardiovascular:      Rate and Rhythm: Normal rate and regular rhythm. Pulses: Normal pulses. Heart sounds: No murmur heard. Pulmonary:      Effort: Pulmonary effort is normal.      Breath sounds: Normal breath sounds. No wheezing or rhonchi. Musculoskeletal:         General: Tenderness (posterior neck area) present. Skin:     Capillary Refill: Capillary refill takes less than 2 seconds. Findings: No rash. Neurological:      General: No focal deficit present. Mental Status: She is alert and oriented to person, place, and time. Psychiatric:         Mood and Affect: Mood normal.         Behavior: Behavior normal.                 An electronic signature was used to authenticate this note.     --Iva Patient, APRN

## 2022-09-13 ENCOUNTER — TELEPHONE (OUTPATIENT)
Dept: PRIMARY CARE CLINIC | Age: 22
End: 2022-09-13

## 2022-09-13 NOTE — TELEPHONE ENCOUNTER
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 She needs additional information for her appeal on ubrelvy. Needs to clarify qty.  Allowance is 16 a month  And will it be used concurrently with another CGRP antagonist    Nurtec, qulipta, Legality, amovig, Ajovey, or [employer]

## 2022-09-16 ENCOUNTER — TELEPHONE (OUTPATIENT)
Dept: PRIMARY CARE CLINIC | Age: 22
End: 2022-09-16

## 2022-09-16 DIAGNOSIS — B37.9 YEAST INFECTION: Primary | ICD-10-CM

## 2022-09-16 RX ORDER — FLUCONAZOLE 150 MG/1
150 TABLET ORAL DAILY
Qty: 3 TABLET | Refills: 0 | Status: SHIPPED | OUTPATIENT
Start: 2022-09-16 | End: 2022-09-19

## 2022-10-03 DIAGNOSIS — G44.039 EPISODIC PAROXYSMAL HEMICRANIA, NOT INTRACTABLE: ICD-10-CM

## 2022-10-03 DIAGNOSIS — M54.2 NECK PAIN: ICD-10-CM

## 2022-10-03 DIAGNOSIS — M54.12 RADICULITIS, CERVICAL: ICD-10-CM

## 2022-10-03 DIAGNOSIS — M79.18 CERVICAL MYOFASCIAL PAIN SYNDROME: ICD-10-CM

## 2022-10-03 DIAGNOSIS — G43.109 MIGRAINE WITH AURA AND WITHOUT STATUS MIGRAINOSUS, NOT INTRACTABLE: ICD-10-CM

## 2022-10-03 RX ORDER — BUTALBITAL, ACETAMINOPHEN, CAFFEINE AND CODEINE PHOSPHATE 50; 325; 40; 30 MG/1; MG/1; MG/1; MG/1
1 CAPSULE ORAL EVERY 8 HOURS PRN
Qty: 60 CAPSULE | Refills: 0 | Status: SHIPPED | OUTPATIENT
Start: 2022-10-03 | End: 2022-11-01 | Stop reason: SDUPTHER

## 2022-10-03 NOTE — TELEPHONE ENCOUNTER
Received fax from pharmacy requesting refill on pts medication(s). Pt was last seen in office on 8/23/2022  and has a follow up scheduled for 10/11/2022. Will send request to  Oriana Bhatti  for authorization. Requested Prescriptions     Pending Prescriptions Disp Refills    butalbital-acetaminophen-caffeine-codeine (FIORICET WITH CODEINE) -13-30 MG per capsule 60 capsule 0     Sig: Take 1 capsule by mouth every 8 hours as needed for Headaches for up to 30 days. no

## 2022-10-18 ENCOUNTER — OFFICE VISIT (OUTPATIENT)
Dept: PRIMARY CARE CLINIC | Age: 22
End: 2022-10-18
Payer: COMMERCIAL

## 2022-10-18 VITALS
HEART RATE: 76 BPM | TEMPERATURE: 98 F | OXYGEN SATURATION: 98 % | BODY MASS INDEX: 29.65 KG/M2 | SYSTOLIC BLOOD PRESSURE: 110 MMHG | WEIGHT: 172.75 LBS | DIASTOLIC BLOOD PRESSURE: 76 MMHG

## 2022-10-18 DIAGNOSIS — M50.30 DDD (DEGENERATIVE DISC DISEASE), CERVICAL: Primary | ICD-10-CM

## 2022-10-18 DIAGNOSIS — R20.2 RIGHT HAND PARESTHESIA: ICD-10-CM

## 2022-10-18 DIAGNOSIS — M62.838 CERVICAL PARASPINAL MUSCLE SPASM: ICD-10-CM

## 2022-10-18 PROCEDURE — 99214 OFFICE O/P EST MOD 30 MIN: CPT | Performed by: NURSE PRACTITIONER

## 2022-10-18 RX ORDER — PREDNISONE 10 MG/1
10 TABLET ORAL DAILY
Qty: 43 TABLET | Refills: 0 | Status: SHIPPED | OUTPATIENT
Start: 2022-10-18 | End: 2022-10-30

## 2022-10-18 RX ORDER — ONDANSETRON 4 MG/1
4 TABLET, ORALLY DISINTEGRATING ORAL 3 TIMES DAILY PRN
Qty: 21 TABLET | Refills: 2 | Status: SHIPPED | OUTPATIENT
Start: 2022-10-18

## 2022-10-18 ASSESSMENT — ENCOUNTER SYMPTOMS
ABDOMINAL PAIN: 0
WHEEZING: 0
SORE THROAT: 0
TROUBLE SWALLOWING: 0
SHORTNESS OF BREATH: 0
COUGH: 0

## 2022-10-18 NOTE — PROGRESS NOTES
Anna President (:  2000) is a 19 y.o. female,Established patient, here for evaluation of the following chief complaint(s):  Follow-up (For migraines and neck pain), Neck Pain (Neck has been hurting worse, right hand is going numb/), and Pharyngitis      ASSESSMENT/PLAN:    ICD-10-CM    1. DDD (degenerative disc disease), cervical  M50.30 predniSONE (DELTASONE) 10 MG tablet  12 days at present  Will let know how it goes  After finish  Cont zanaflex   Tens unit to area       External Referral To Neurosurgery  Due to severity and limitations  Will get disc and take to Dr Abilio Barnes second opinion        2. Cervical paraspinal muscle spasm  M62.838 ondansetron (ZOFRAN-ODT) 4 MG disintegrating tablet     predniSONE (DELTASONE) 10 MG tablet  Zanaflex half -1 tid        External Referral To Neurosurgery  As has seen two   But still feels not right  With hand symptoms        3. Right hand paresthesia  R20.2 predniSONE (DELTASONE) 10 MG tablet  Monitor for changes  Consider more imaging          Return in about 6 weeks (around 2022) for follow up neck pain headaches and .     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 Negative for appetite change, fatigue and fever. HENT:  Negative for congestion, postnasal drip, sore throat and trouble swallowing. Respiratory:  Negative for cough, shortness of breath and wheezing. Cardiovascular:  Negative for chest pain, palpitations and leg swelling. Gastrointestinal:  Negative for abdominal pain. Genitourinary:  Negative for difficulty urinating. Musculoskeletal:  Positive for arthralgias, neck pain and neck stiffness. Skin:  Negative for rash. Neurological:  Positive for dizziness (slight) and headaches. Psychiatric/Behavioral:  Negative for behavioral problems and sleep disturbance. The patient is not nervous/anxious and is not hyperactive. Physical Exam  Vitals reviewed. Constitutional:       General: She is not in acute distress. Appearance: Normal appearance. She is not ill-appearing. HENT:      Head: Normocephalic. Right Ear: A middle ear effusion (fransisca) is present. There is no impacted cerumen. Left Ear: There is no impacted cerumen. Cardiovascular:      Rate and Rhythm: Normal rate and regular rhythm. Heart sounds: No murmur heard. Pulmonary:      Effort: Pulmonary effort is normal.      Breath sounds: No wheezing or rhonchi. Musculoskeletal:         General: Tenderness (c spine with decreased ROM right hand numbness) present. Skin:     Capillary Refill: Capillary refill takes less than 2 seconds. Findings: No rash. Neurological:      General: No focal deficit present. Mental Status: She is alert and oriented to person, place, and time. Psychiatric:         Mood and Affect: Mood normal.         Behavior: Behavior normal.                 An electronic signature was used to authenticate this note.     --PATRICIA Nickerson

## 2022-11-01 DIAGNOSIS — G43.109 MIGRAINE WITH AURA AND WITHOUT STATUS MIGRAINOSUS, NOT INTRACTABLE: ICD-10-CM

## 2022-11-01 DIAGNOSIS — M79.18 CERVICAL MYOFASCIAL PAIN SYNDROME: ICD-10-CM

## 2022-11-01 DIAGNOSIS — G44.039 EPISODIC PAROXYSMAL HEMICRANIA, NOT INTRACTABLE: ICD-10-CM

## 2022-11-01 DIAGNOSIS — M54.12 RADICULITIS, CERVICAL: ICD-10-CM

## 2022-11-01 DIAGNOSIS — M54.2 NECK PAIN: ICD-10-CM

## 2022-11-01 RX ORDER — BUTALBITAL, ACETAMINOPHEN, CAFFEINE AND CODEINE PHOSPHATE 50; 325; 40; 30 MG/1; MG/1; MG/1; MG/1
1 CAPSULE ORAL EVERY 8 HOURS PRN
Qty: 60 CAPSULE | Refills: 0 | Status: SHIPPED | OUTPATIENT
Start: 2022-11-01 | End: 2022-11-20 | Stop reason: SDUPTHER

## 2022-11-01 NOTE — TELEPHONE ENCOUNTER
Received fax from pharmacy requesting refill on pts medication(s). Pt was last seen in office on 10/18/2022  and has a follow up scheduled for 11/29/2022. Will send request to  Aminah Paredes  for authorization. Requested Prescriptions     Pending Prescriptions Disp Refills    butalbital-acetaminophen-caffeine-codeine (FIORICET WITH CODEINE) -31-30 MG per capsule 60 capsule 0     Sig: Take 1 capsule by mouth every 8 hours as needed for Headaches for up to 30 days.

## 2022-11-08 DIAGNOSIS — Z20.818 STREP THROAT EXPOSURE: Primary | ICD-10-CM

## 2022-11-08 RX ORDER — AMOXICILLIN 875 MG/1
875 TABLET, COATED ORAL 2 TIMES DAILY
Qty: 20 TABLET | Refills: 0 | Status: SHIPPED | OUTPATIENT
Start: 2022-11-08 | End: 2022-11-18

## 2022-11-08 NOTE — TELEPHONE ENCOUNTER
Pt called, her mom, niece, and nephew have strep. She said that she gets it easily and wants to know if can get an antibiotic for when she starts having symptoms.

## 2022-11-17 ENCOUNTER — PATIENT MESSAGE (OUTPATIENT)
Dept: PRIMARY CARE CLINIC | Age: 22
End: 2022-11-17

## 2022-11-17 DIAGNOSIS — M62.838 CERVICAL PARASPINAL MUSCLE SPASM: ICD-10-CM

## 2022-11-17 DIAGNOSIS — G43.109 MIGRAINE WITH AURA AND WITHOUT STATUS MIGRAINOSUS, NOT INTRACTABLE: ICD-10-CM

## 2022-11-17 DIAGNOSIS — M54.2 NECK PAIN: ICD-10-CM

## 2022-11-17 DIAGNOSIS — G43.109 MIGRAINE WITH AURA AND WITHOUT STATUS MIGRAINOSUS, NOT INTRACTABLE: Primary | ICD-10-CM

## 2022-11-17 DIAGNOSIS — M54.12 RADICULITIS, CERVICAL: ICD-10-CM

## 2022-11-18 RX ORDER — UBROGEPANT 100 MG/1
1 TABLET ORAL DAILY PRN
Qty: 10 TABLET | Refills: 11 | Status: SHIPPED | OUTPATIENT
Start: 2022-11-18

## 2022-11-18 NOTE — TELEPHONE ENCOUNTER
From: Yon Chaudhry  To: Chelsea Bertrand  Sent: 11/17/2022 8:10 PM CST  Subject: Headache/Neck Pain increasing    My neck pain is getting progressively worse, and the soonest they could get me into the neurologist you got me in with was March of next year. Is there any way I can be referred to somewhere else sooner? I feel like something is severely wrong.

## 2022-11-18 NOTE — TELEPHONE ENCOUNTER
Received fax from pharmacy requesting refill on pts medication(s). Pt was last seen in office on 10/18/2022  and has a follow up scheduled for 11/29/2022. Will send request to  Sukumar Oliva  for authorization.      Requested Prescriptions     Pending Prescriptions Disp Refills    Ubrogepant (UBRELVY) 100 MG TABS 10 tablet 11     Sig: Take 1 tablet by mouth daily as needed (migraine)

## 2022-11-20 DIAGNOSIS — M54.2 NECK PAIN: ICD-10-CM

## 2022-11-20 DIAGNOSIS — M79.18 CERVICAL MYOFASCIAL PAIN SYNDROME: ICD-10-CM

## 2022-11-20 DIAGNOSIS — G43.109 MIGRAINE WITH AURA AND WITHOUT STATUS MIGRAINOSUS, NOT INTRACTABLE: ICD-10-CM

## 2022-11-20 DIAGNOSIS — G44.039 EPISODIC PAROXYSMAL HEMICRANIA, NOT INTRACTABLE: ICD-10-CM

## 2022-11-20 DIAGNOSIS — M54.12 RADICULITIS, CERVICAL: ICD-10-CM

## 2022-11-21 RX ORDER — BUTALBITAL, ACETAMINOPHEN, CAFFEINE AND CODEINE PHOSPHATE 50; 325; 40; 30 MG/1; MG/1; MG/1; MG/1
1 CAPSULE ORAL EVERY 8 HOURS PRN
Qty: 60 CAPSULE | Refills: 0 | Status: SHIPPED | OUTPATIENT
Start: 2022-11-21 | End: 2022-12-21

## 2022-11-21 NOTE — TELEPHONE ENCOUNTER
Received fax from pharmacy requesting refill on pts medication(s). Pt was last seen in office on 10/18/2022  and has a follow up scheduled for 11/21/2022. Will send request to  Koby Rodriguez  for authorization. Requested Prescriptions     Pending Prescriptions Disp Refills    butalbital-acetaminophen-caffeine-codeine (FIORICET WITH CODEINE) -84-30 MG per capsule 60 capsule 0     Sig: Take 1 capsule by mouth every 8 hours as needed for Headaches for up to 30 days.

## 2022-11-29 ENCOUNTER — OFFICE VISIT (OUTPATIENT)
Dept: PRIMARY CARE CLINIC | Age: 22
End: 2022-11-29
Payer: COMMERCIAL

## 2022-11-29 VITALS
TEMPERATURE: 97.6 F | OXYGEN SATURATION: 98 % | WEIGHT: 177 LBS | BODY MASS INDEX: 30.38 KG/M2 | HEART RATE: 83 BPM | SYSTOLIC BLOOD PRESSURE: 136 MMHG | DIASTOLIC BLOOD PRESSURE: 84 MMHG

## 2022-11-29 DIAGNOSIS — M54.2 CERVICALGIA: ICD-10-CM

## 2022-11-29 DIAGNOSIS — M79.18 CERVICAL MYOFASCIAL PAIN SYNDROME: Primary | ICD-10-CM

## 2022-11-29 DIAGNOSIS — G43.109 MIGRAINE WITH AURA AND WITHOUT STATUS MIGRAINOSUS, NOT INTRACTABLE: ICD-10-CM

## 2022-11-29 DIAGNOSIS — R20.0 RIGHT ARM NUMBNESS: ICD-10-CM

## 2022-11-29 PROCEDURE — 99214 OFFICE O/P EST MOD 30 MIN: CPT | Performed by: NURSE PRACTITIONER

## 2022-11-29 PROCEDURE — 96372 THER/PROPH/DIAG INJ SC/IM: CPT | Performed by: NURSE PRACTITIONER

## 2022-11-29 RX ORDER — KETOROLAC TROMETHAMINE 30 MG/ML
30 INJECTION, SOLUTION INTRAMUSCULAR; INTRAVENOUS ONCE
Status: COMPLETED | OUTPATIENT
Start: 2022-11-29 | End: 2022-11-29

## 2022-11-29 RX ADMIN — KETOROLAC TROMETHAMINE 30 MG: 30 INJECTION, SOLUTION INTRAMUSCULAR; INTRAVENOUS at 16:29

## 2022-11-29 ASSESSMENT — ENCOUNTER SYMPTOMS
NAUSEA: 0
ABDOMINAL PAIN: 0
WHEEZING: 0
SHORTNESS OF BREATH: 0
COUGH: 0
CONSTIPATION: 0
VOMITING: 0

## 2022-11-29 NOTE — PROGRESS NOTES
Stefan Will (:  2000) is a 23 y.o. female,Established patient, here for evaluation of the following chief complaint(s):  Follow-up (For neck, pain and headaches)      ASSESSMENT/PLAN:    ICD-10-CM    1. Cervical myofascial pain syndrome  M79.18 External Referral To Neurosurgery  As failed here  And getting worse   With ROM and school worse        External Referral To Neurology        2. Cervicalgia  M54.2 External Referral To Neurosurgery     External Referral To Neurology     ketorolac (TORADOL) injection 30 mg      3. Right arm numbness  R20.0 External Referral To Neurosurgery     External Referral To Neurology  Forgetting things  And odd things        ketorolac (TORADOL) injection 30 mg      4. Migraine with aura and without status migrainosus, not intractable  G43.109 External Referral To Neurosurgery     External Referral To Neurology     ketorolac (TORADOL) injection 30 mg          Return in about 4 weeks (around 2022) for headache and referrals.     SUBJECTIVE/OBJECTIVE:  HPI    Patient is here for neck pain  Reports it just getting worse  With teaching constantly moving neck getting severe  She notes that every day   She had hand numbness   Reports the hand 30 minutes   Reports that notices when standing her neck will hurt and goes straight to hand    She tried a referral to [facility] neuro[facility]   And not til march   She also suffers with migraines and worse daily  No relief with prednisone    She did take some ubrelevy with some relief  But back to pain again   And requires the esgic with relief but at times   She just \"over the top \"    She notes her neck and all on right side of body  Only neck and right side of head  With some constant tenderness and stabbing      /84 (Site: Right Upper Arm, Position: Sitting, Cuff Size: Large Adult)   Pulse 83   Temp 97.6 °F (36.4 °C) (Temporal)   Wt 177 lb (80.3 kg)   SpO2 98%   BMI 30.38 kg/m²   Review of Systems   Constitutional: Positive for activity change. Negative for appetite change, fatigue and fever. HENT:  Negative for postnasal drip. Respiratory:  Negative for cough, shortness of breath and wheezing. Cardiovascular:  Negative for chest pain, palpitations and leg swelling. Gastrointestinal:  Negative for abdominal pain, constipation, nausea and vomiting. Genitourinary:  Negative for difficulty urinating. Musculoskeletal:  Positive for arthralgias, myalgias, neck pain and neck stiffness. Skin:  Negative for rash. Neurological:  Positive for light-headedness and headaches. Negative for syncope and weakness. Hematological:  Negative for adenopathy. Psychiatric/Behavioral:  Negative for behavioral problems, self-injury and sleep disturbance. The patient is not nervous/anxious. Physical Exam  Vitals reviewed. Constitutional:       General: She is not in acute distress. Appearance: Normal appearance. She is not ill-appearing. HENT:      Head: Normocephalic. Right Ear: No middle ear effusion. There is no impacted cerumen. Left Ear:  No middle ear effusion. There is no impacted cerumen. Cardiovascular:      Rate and Rhythm: Normal rate and regular rhythm. Heart sounds: No murmur heard. Pulmonary:      Effort: Pulmonary effort is normal.      Breath sounds: No wheezing or rhonchi. Musculoskeletal:         General: Tenderness (c spine with decreased ROM right hand numbness) present. Skin:     Capillary Refill: Capillary refill takes less than 2 seconds. Findings: No rash. Neurological:      General: No focal deficit present. Mental Status: She is alert and oriented to person, place, and time. Psychiatric:         Mood and Affect: Mood normal.         Behavior: Behavior normal.                 An electronic signature was used to authenticate this note.     --PATRICIA Salmeron

## 2022-12-09 DIAGNOSIS — G44.039 EPISODIC PAROXYSMAL HEMICRANIA, NOT INTRACTABLE: ICD-10-CM

## 2022-12-09 DIAGNOSIS — M54.2 NECK PAIN: ICD-10-CM

## 2022-12-09 DIAGNOSIS — M79.18 CERVICAL MYOFASCIAL PAIN SYNDROME: ICD-10-CM

## 2022-12-09 DIAGNOSIS — M54.12 RADICULITIS, CERVICAL: ICD-10-CM

## 2022-12-09 DIAGNOSIS — G43.109 MIGRAINE WITH AURA AND WITHOUT STATUS MIGRAINOSUS, NOT INTRACTABLE: ICD-10-CM

## 2022-12-09 RX ORDER — BUTALBITAL, ACETAMINOPHEN, CAFFEINE AND CODEINE PHOSPHATE 50; 325; 40; 30 MG/1; MG/1; MG/1; MG/1
1 CAPSULE ORAL EVERY 8 HOURS PRN
Qty: 60 CAPSULE | Refills: 0 | Status: SHIPPED | OUTPATIENT
Start: 2022-12-09 | End: 2022-12-29

## 2022-12-09 NOTE — TELEPHONE ENCOUNTER
Received fax from pharmacy requesting refill on pts medication(s). Pt was last seen in office on 11/29/2022  and has a follow up scheduled for 12/27/2022. Will send request to  Clary Gerber  for authorization. Requested Prescriptions     Pending Prescriptions Disp Refills    butalbital-acetaminophen-caffeine-codeine (FIORICET WITH CODEINE) -36-30 MG per capsule 60 capsule 0     Sig: Take 1 capsule by mouth every 8 hours as needed for Headaches for up to 30 days.

## 2022-12-12 DIAGNOSIS — G43.109 MIGRAINE WITH AURA AND WITHOUT STATUS MIGRAINOSUS, NOT INTRACTABLE: ICD-10-CM

## 2022-12-12 RX ORDER — UBROGEPANT 100 MG/1
1 TABLET ORAL DAILY PRN
Qty: 10 TABLET | Refills: 11 | Status: SHIPPED | OUTPATIENT
Start: 2022-12-12

## 2022-12-12 NOTE — TELEPHONE ENCOUNTER
Received fax from pharmacy requesting refill on pts medication(s). Pt was last seen in office on 11/29/2022  and has a follow up scheduled for 12/27/2022. Will send request to  Caden Huerta  for authorization.      Requested Prescriptions     Pending Prescriptions Disp Refills    Ubrogepant (UBRELVY) 100 MG TABS 10 tablet 11     Sig: Take 1 tablet by mouth daily as needed (migraine)

## 2022-12-21 DIAGNOSIS — G43.109 MIGRAINE WITH AURA AND WITHOUT STATUS MIGRAINOSUS, NOT INTRACTABLE: ICD-10-CM

## 2022-12-21 RX ORDER — UBROGEPANT 100 MG/1
1 TABLET ORAL DAILY PRN
Qty: 10 TABLET | Refills: 11 | Status: SHIPPED | OUTPATIENT
Start: 2022-12-21

## 2022-12-27 ENCOUNTER — OFFICE VISIT (OUTPATIENT)
Dept: PRIMARY CARE CLINIC | Age: 22
End: 2022-12-27
Payer: COMMERCIAL

## 2022-12-27 VITALS
BODY MASS INDEX: 30.04 KG/M2 | WEIGHT: 175 LBS | DIASTOLIC BLOOD PRESSURE: 79 MMHG | SYSTOLIC BLOOD PRESSURE: 105 MMHG | TEMPERATURE: 98 F | OXYGEN SATURATION: 98 % | HEART RATE: 120 BPM

## 2022-12-27 DIAGNOSIS — M79.18 CERVICAL MYOFASCIAL PAIN SYNDROME: Primary | ICD-10-CM

## 2022-12-27 DIAGNOSIS — G43.109 MIGRAINE WITH AURA AND WITHOUT STATUS MIGRAINOSUS, NOT INTRACTABLE: ICD-10-CM

## 2022-12-27 DIAGNOSIS — M54.2 CERVICALGIA: ICD-10-CM

## 2022-12-27 PROCEDURE — 99214 OFFICE O/P EST MOD 30 MIN: CPT | Performed by: NURSE PRACTITIONER

## 2022-12-27 SDOH — ECONOMIC STABILITY: FOOD INSECURITY: WITHIN THE PAST 12 MONTHS, YOU WORRIED THAT YOUR FOOD WOULD RUN OUT BEFORE YOU GOT MONEY TO BUY MORE.: NEVER TRUE

## 2022-12-27 SDOH — ECONOMIC STABILITY: FOOD INSECURITY: WITHIN THE PAST 12 MONTHS, THE FOOD YOU BOUGHT JUST DIDN'T LAST AND YOU DIDN'T HAVE MONEY TO GET MORE.: NEVER TRUE

## 2022-12-27 ASSESSMENT — ENCOUNTER SYMPTOMS
WHEEZING: 0
ABDOMINAL PAIN: 0
SHORTNESS OF BREATH: 0
COUGH: 0
VOMITING: 0
CONSTIPATION: 0
NAUSEA: 0

## 2022-12-27 ASSESSMENT — SOCIAL DETERMINANTS OF HEALTH (SDOH): HOW HARD IS IT FOR YOU TO PAY FOR THE VERY BASICS LIKE FOOD, HOUSING, MEDICAL CARE, AND HEATING?: NOT HARD AT ALL

## 2022-12-27 NOTE — PROGRESS NOTES
Rupert Ackerman (:  2000) is a 23 y.o. female,Established patient, here for evaluation of the following chief complaint(s):  1 Month Follow-Up (No new concerns)      ASSESSMENT/PLAN:    ICD-10-CM    1. Cervical myofascial pain syndrome  M79.18   Cont present  Pending with specialist  Is waiting to see at [facility]        2. Migraine with aura and without status migrainosus, not intractable  G43.109 Cont log        3. Cervicalgia  M54.2 Waiting on zynex            Return in about 6 weeks (around 2023) for migraine follow up. SUBJECTIVE/OBJECTIVE:  HPI      Patient is here for headache follow up    Reports that neck /migraines   Set to go to for neck issues  Is set for feb now : With change in insurance with his father on insurance       Set up for zynex  To be sent to arrive tomorrow   Reports is anxious to see if helps    Neck stiffness and headaches are worsening  Reports last night was severe to point would go to ER if had insurance  She reports the fiorcet generally will help  Taking ubrelevy when bad     Friday - today  Not ate a full meal   She feels nauseated and not wanting to eat  Denies any diarrhea   Reports the pain is making her feel sick   Has zofran ODT just when severe  Took a bite breakfast and couldn't do it              /79 (Site: Right Upper Arm, Position: Sitting, Cuff Size: Large Adult)   Pulse (!) 120   Temp 98 °F (36.7 °C) (Temporal)   Wt 175 lb (79.4 kg)   SpO2 98%   BMI 30.04 kg/m²   Review of Systems   Constitutional:  Positive for activity change. Negative for appetite change, fatigue and fever. HENT:  Negative for postnasal drip. Respiratory:  Negative for cough, shortness of breath and wheezing. Cardiovascular:  Negative for chest pain, palpitations and leg swelling. Gastrointestinal:  Negative for abdominal pain, constipation, nausea and vomiting. Genitourinary:  Negative for difficulty urinating.    Musculoskeletal:  Positive for arthralgias, myalgias, neck pain and neck stiffness. Skin:  Negative for rash. Neurological:  Positive for light-headedness and headaches. Negative for syncope and weakness. Hematological:  Negative for adenopathy. Psychiatric/Behavioral:  Negative for behavioral problems, self-injury and sleep disturbance. The patient is not nervous/anxious. Physical Exam  Vitals reviewed. Constitutional:       General: She is not in acute distress. Appearance: Normal appearance. She is not ill-appearing. HENT:      Head: Normocephalic. Right Ear: No middle ear effusion. There is no impacted cerumen. Left Ear:  No middle ear effusion. There is no impacted cerumen. Cardiovascular:      Rate and Rhythm: Normal rate and regular rhythm. Heart sounds: No murmur heard. Pulmonary:      Effort: Pulmonary effort is normal.      Breath sounds: No wheezing or rhonchi. Musculoskeletal:         General: Tenderness (c spine with decreased ROM right hand numbness) present. Skin:     Capillary Refill: Capillary refill takes less than 2 seconds. Findings: No rash. Neurological:      General: No focal deficit present. Mental Status: She is alert and oriented to person, place, and time. Psychiatric:         Mood and Affect: Mood normal.         Behavior: Behavior normal.                 An electronic signature was used to authenticate this note.     --PATRICIA Shetty

## 2023-01-01 DIAGNOSIS — G43.109 MIGRAINE WITH AURA AND WITHOUT STATUS MIGRAINOSUS, NOT INTRACTABLE: ICD-10-CM

## 2023-01-03 ENCOUNTER — PATIENT MESSAGE (OUTPATIENT)
Dept: PRIMARY CARE CLINIC | Age: 23
End: 2023-01-03

## 2023-01-03 DIAGNOSIS — M54.2 NECK PAIN: ICD-10-CM

## 2023-01-03 DIAGNOSIS — M79.18 CERVICAL MYOFASCIAL PAIN SYNDROME: ICD-10-CM

## 2023-01-03 DIAGNOSIS — G44.039 EPISODIC PAROXYSMAL HEMICRANIA, NOT INTRACTABLE: ICD-10-CM

## 2023-01-03 DIAGNOSIS — G43.109 MIGRAINE WITH AURA AND WITHOUT STATUS MIGRAINOSUS, NOT INTRACTABLE: ICD-10-CM

## 2023-01-03 DIAGNOSIS — M54.12 RADICULITIS, CERVICAL: ICD-10-CM

## 2023-01-03 RX ORDER — UBROGEPANT 100 MG/1
1 TABLET ORAL DAILY PRN
Qty: 10 TABLET | Refills: 11 | OUTPATIENT
Start: 2023-01-03

## 2023-01-03 RX ORDER — BUTALBITAL, ACETAMINOPHEN, CAFFEINE AND CODEINE PHOSPHATE 50; 325; 40; 30 MG/1; MG/1; MG/1; MG/1
1 CAPSULE ORAL EVERY 8 HOURS PRN
Qty: 60 CAPSULE | Refills: 0 | Status: SHIPPED | OUTPATIENT
Start: 2023-01-03 | End: 2023-01-23

## 2023-01-03 NOTE — TELEPHONE ENCOUNTER
From: Shaila Mehta  To: Liv Ding  Sent: 1/3/2023 7:09 AM CST  Subject: Butal-ace refill     I am needing my pain medication refilled please.

## 2023-01-03 NOTE — TELEPHONE ENCOUNTER
Received call/My Chart Message from patient requesting refill on medication(s). Pt was last seen in office on 12/27/2022  and has a follow up scheduled for 2/7/2023. Will send request to provider for authorization. Requested Prescriptions     Pending Prescriptions Disp Refills    butalbital-acetaminophen-caffeine-codeine (FIORICET WITH CODEINE) -86-30 MG per capsule 60 capsule 0     Sig: Take 1 capsule by mouth every 8 hours as needed for Headaches for up to 20 days.

## 2023-01-12 ENCOUNTER — PATIENT MESSAGE (OUTPATIENT)
Dept: PRIMARY CARE CLINIC | Age: 23
End: 2023-01-12

## 2023-01-12 DIAGNOSIS — G43.109 MIGRAINE WITH AURA AND WITHOUT STATUS MIGRAINOSUS, NOT INTRACTABLE: Primary | ICD-10-CM

## 2023-01-12 RX ORDER — TIZANIDINE 4 MG/1
2-4 TABLET ORAL PRN
Qty: 30 TABLET | Refills: 0 | Status: SHIPPED | OUTPATIENT
Start: 2023-01-12

## 2023-01-12 NOTE — TELEPHONE ENCOUNTER
From: Norma Scott  To: Elliot Goodwin  Sent: 1/12/2023 11:07 AM CST  Subject: Pain     I was wondering if Jeimy Porras could find some other medication for the pain I am experiencing. I have an appt with [facility] on February 20th. The pain is worsening and the Zyntex or pain medication is not cutting it.

## 2023-01-13 DIAGNOSIS — G43.109 MIGRAINE WITH AURA AND WITHOUT STATUS MIGRAINOSUS, NOT INTRACTABLE: ICD-10-CM

## 2023-01-13 RX ORDER — UBROGEPANT 100 MG/1
1 TABLET ORAL DAILY PRN
Qty: 10 TABLET | Refills: 11 | OUTPATIENT
Start: 2023-01-13

## 2023-01-13 NOTE — TELEPHONE ENCOUNTER
Received fax from pharmacy requesting refill on pts medication(s). Pt was last seen in office on 12/27/2022  and has a follow up scheduled for 2/7/2023. This was sent to [facility] pharmacy in December 2022 with 11 refills. Sent pt a My Chart message letting her know to call the pharmacy.    Requested Prescriptions     Refused Prescriptions Disp Refills    Ubrogepant (UBRELVY) 100 MG TABS 10 tablet 11     Sig: Take 1 tablet by mouth daily as needed (migraine)     Refused By: Caitie Place     Reason for Refusal: Refill not appropriate

## 2023-01-23 DIAGNOSIS — G44.039 EPISODIC PAROXYSMAL HEMICRANIA, NOT INTRACTABLE: ICD-10-CM

## 2023-01-23 DIAGNOSIS — M79.18 CERVICAL MYOFASCIAL PAIN SYNDROME: ICD-10-CM

## 2023-01-23 DIAGNOSIS — M54.2 NECK PAIN: ICD-10-CM

## 2023-01-23 DIAGNOSIS — G43.109 MIGRAINE WITH AURA AND WITHOUT STATUS MIGRAINOSUS, NOT INTRACTABLE: ICD-10-CM

## 2023-01-23 DIAGNOSIS — M54.12 RADICULITIS, CERVICAL: ICD-10-CM

## 2023-01-23 RX ORDER — BUTALBITAL, ACETAMINOPHEN, CAFFEINE AND CODEINE PHOSPHATE 50; 325; 40; 30 MG/1; MG/1; MG/1; MG/1
1 CAPSULE ORAL EVERY 8 HOURS PRN
Qty: 60 CAPSULE | Refills: 0 | Status: SHIPPED | OUTPATIENT
Start: 2023-01-23 | End: 2023-02-12

## 2023-01-23 NOTE — TELEPHONE ENCOUNTER
Received fax from pharmacy requesting refill on pts medication(s). Pt was last seen in office on 12/27/2022  and has a follow up scheduled for 2/7/2023. Will send request to  Billy Hayward  for authorization. Requested Prescriptions     Pending Prescriptions Disp Refills    butalbital-acetaminophen-caffeine-codeine (FIORICET WITH CODEINE) -28-30 MG per capsule 60 capsule 0     Sig: Take 1 capsule by mouth every 8 hours as needed for Headaches for up to 20 days.

## 2023-01-27 DIAGNOSIS — G43.109 MIGRAINE WITH AURA AND WITHOUT STATUS MIGRAINOSUS, NOT INTRACTABLE: ICD-10-CM

## 2023-01-27 RX ORDER — UBROGEPANT 100 MG/1
1 TABLET ORAL DAILY PRN
Qty: 10 TABLET | Refills: 11 | OUTPATIENT
Start: 2023-01-27

## 2023-02-03 ENCOUNTER — PATIENT MESSAGE (OUTPATIENT)
Dept: FAMILY MEDICINE CLINIC | Age: 23
End: 2023-02-03

## 2023-02-03 DIAGNOSIS — N30.00 ACUTE CYSTITIS WITHOUT HEMATURIA: Primary | ICD-10-CM

## 2023-02-03 DIAGNOSIS — B37.31 VAGINAL YEAST INFECTION: ICD-10-CM

## 2023-02-03 RX ORDER — FLUCONAZOLE 150 MG/1
150 TABLET ORAL DAILY
Qty: 3 TABLET | Refills: 0 | Status: SHIPPED | OUTPATIENT
Start: 2023-02-03 | End: 2023-02-06

## 2023-02-03 RX ORDER — CIPROFLOXACIN 500 MG/1
500 TABLET, FILM COATED ORAL 2 TIMES DAILY
Qty: 20 TABLET | Refills: 0 | Status: SHIPPED | OUTPATIENT
Start: 2023-02-03 | End: 2023-02-13

## 2023-02-03 NOTE — TELEPHONE ENCOUNTER
From: Bhupendra Way  To: Oscar Blair  Sent: 2/3/2023 7:14 AM CST  Subject: Uti/yeast infection     I have a uti/yeast infection. Could I have medicine to clear it up.

## 2023-02-07 ENCOUNTER — OFFICE VISIT (OUTPATIENT)
Dept: FAMILY MEDICINE CLINIC | Age: 23
End: 2023-02-07
Payer: COMMERCIAL

## 2023-02-07 VITALS
OXYGEN SATURATION: 98 % | TEMPERATURE: 98.1 F | SYSTOLIC BLOOD PRESSURE: 124 MMHG | BODY MASS INDEX: 30.12 KG/M2 | DIASTOLIC BLOOD PRESSURE: 86 MMHG | HEART RATE: 98 BPM | WEIGHT: 175.5 LBS

## 2023-02-07 DIAGNOSIS — M54.2 NECK PAIN: ICD-10-CM

## 2023-02-07 DIAGNOSIS — M79.18 CERVICAL MYOFASCIAL PAIN SYNDROME: ICD-10-CM

## 2023-02-07 DIAGNOSIS — M54.12 RADICULITIS, CERVICAL: ICD-10-CM

## 2023-02-07 DIAGNOSIS — G44.039 EPISODIC PAROXYSMAL HEMICRANIA, NOT INTRACTABLE: ICD-10-CM

## 2023-02-07 DIAGNOSIS — G43.109 MIGRAINE WITH AURA AND WITHOUT STATUS MIGRAINOSUS, NOT INTRACTABLE: ICD-10-CM

## 2023-02-07 PROCEDURE — 99214 OFFICE O/P EST MOD 30 MIN: CPT | Performed by: NURSE PRACTITIONER

## 2023-02-07 RX ORDER — BUTALBITAL, ACETAMINOPHEN, CAFFEINE AND CODEINE PHOSPHATE 50; 325; 40; 30 MG/1; MG/1; MG/1; MG/1
1 CAPSULE ORAL EVERY 8 HOURS PRN
Qty: 60 CAPSULE | Refills: 0 | Status: SHIPPED | OUTPATIENT
Start: 2023-02-07 | End: 2023-02-27

## 2023-02-07 RX ORDER — PROMETHAZINE HYDROCHLORIDE 25 MG/1
25 TABLET ORAL 4 TIMES DAILY PRN
Qty: 20 TABLET | Refills: 0 | Status: SHIPPED | OUTPATIENT
Start: 2023-02-07 | End: 2023-02-14

## 2023-02-07 SDOH — ECONOMIC STABILITY: FOOD INSECURITY: WITHIN THE PAST 12 MONTHS, YOU WORRIED THAT YOUR FOOD WOULD RUN OUT BEFORE YOU GOT MONEY TO BUY MORE.: NEVER TRUE

## 2023-02-07 SDOH — ECONOMIC STABILITY: HOUSING INSECURITY
IN THE LAST 12 MONTHS, WAS THERE A TIME WHEN YOU DID NOT HAVE A STEADY PLACE TO SLEEP OR SLEPT IN A SHELTER (INCLUDING NOW)?: NO

## 2023-02-07 SDOH — ECONOMIC STABILITY: INCOME INSECURITY: HOW HARD IS IT FOR YOU TO PAY FOR THE VERY BASICS LIKE FOOD, HOUSING, MEDICAL CARE, AND HEATING?: NOT HARD AT ALL

## 2023-02-07 SDOH — ECONOMIC STABILITY: FOOD INSECURITY: WITHIN THE PAST 12 MONTHS, THE FOOD YOU BOUGHT JUST DIDN'T LAST AND YOU DIDN'T HAVE MONEY TO GET MORE.: NEVER TRUE

## 2023-02-07 ASSESSMENT — ENCOUNTER SYMPTOMS
SHORTNESS OF BREATH: 0
NAUSEA: 0
ABDOMINAL PAIN: 0
WHEEZING: 0
COUGH: 0
VOMITING: 0
CONSTIPATION: 0

## 2023-02-07 NOTE — PROGRESS NOTES
Annalee Hu (:  2000) is a 23 y.o. female,Established patient, here for evaluation of the following chief complaint(s):  Follow-up (For migraines)      ASSESSMENT/PLAN:    ICD-10-CM    1. Cervical myofascial pain syndrome  M79.18 butalbital-acetaminophen-caffeine-codeine (FIORICET WITH CODEINE) -99-30 MG per capsule  Cont to monitor  Using  this month nurtec every other day   Consider quilipta      Rimegepant Sulfate 75 MG TBDP      2. Migraine with aura and without status migrainosus, not intractable  G43.109 butalbital-acetaminophen-caffeine-codeine (FIORICET WITH CODEINE) -22-30 MG per capsule     Rimegepant Sulfate 75 MG TBDP      3. Episodic paroxysmal hemicrania, not intractable  G44.039 butalbital-acetaminophen-caffeine-codeine (FIORICET WITH CODEINE) -96-30 MG per capsule     Rimegepant Sulfate 75 MG TBDP      4. Neck pain  M54.2 butalbital-acetaminophen-caffeine-codeine (FIORICET WITH CODEINE) -57-30 MG per capsule     Rimegepant Sulfate 75 MG TBDP  Pending with neurology        5. Radiculitis, cervical  M54.12 butalbital-acetaminophen-caffeine-codeine (FIORICET WITH CODEINE) -36-30 MG per capsule     Rimegepant Sulfate 75 MG TBDP          Return in about 1 month (around 3/7/2023) for headache follow up nutrec every other day.     SUBJECTIVE/OBJECTIVE:  HPI    Patient is here for headaches follow up    Reports that has been dealing with migraines  She reports they have been worse for a while  Reports that the pain was bearable   But nauseated  Reports the headaches worse recently   She notes the headache occur around 20 minutes  She can stop it      Zynex machine   She has been using it with some relief  Reports that when on neck it seems to almost irritate it   She reports it will ease  some but wonders if getting worse    Past week nauseated ( on cipro )  Reports the last week pain is worse  And wonders if it causes   With the neck pain to headache  She was set up at [facility] 2/14th  Scheduling issues : march 3rd       Reports notes the other day end of fingers feel like will explode  Reports even turning key will get shock in hand       /86 (Site: Right Upper Arm, Position: Sitting, Cuff Size: Large Adult)   Pulse 98   Temp 98.1 °F (36.7 °C) (Temporal)   Wt 175 lb 8 oz (79.6 kg)   SpO2 98%   BMI 30.12 kg/m²   Review of Systems   Constitutional:  Positive for activity change. Negative for appetite change, fatigue and fever. HENT:  Negative for postnasal drip. Respiratory:  Negative for cough, shortness of breath and wheezing. Cardiovascular:  Negative for chest pain, palpitations and leg swelling. Gastrointestinal:  Negative for abdominal pain, constipation, nausea and vomiting. Genitourinary:  Negative for difficulty urinating. Musculoskeletal:  Positive for arthralgias, myalgias, neck pain and neck stiffness. Skin:  Negative for rash. Neurological:  Positive for light-headedness and headaches. Negative for syncope and weakness. Hematological:  Negative for adenopathy. Psychiatric/Behavioral:  Negative for behavioral problems, self-injury and sleep disturbance. The patient is not nervous/anxious. Physical Exam  Vitals reviewed. Constitutional:       General: She is not in acute distress. Appearance: Normal appearance. She is not ill-appearing. HENT:      Head: Normocephalic. Right Ear: No middle ear effusion. There is no impacted cerumen. Left Ear:  No middle ear effusion. There is no impacted cerumen. Cardiovascular:      Rate and Rhythm: Normal rate and regular rhythm. Heart sounds: No murmur heard. Pulmonary:      Effort: Pulmonary effort is normal.      Breath sounds: No wheezing or rhonchi. Musculoskeletal:         General: Tenderness (c spine with decreased ROM right hand numbness) present. Skin:     Capillary Refill: Capillary refill takes less than 2 seconds. Findings: No rash.    Neurological: General: No focal deficit present. Mental Status: She is alert and oriented to person, place, and time. Psychiatric:         Mood and Affect: Mood normal.         Behavior: Behavior normal.                 An electronic signature was used to authenticate this note.     --PATRICIA Garay

## 2023-02-09 DIAGNOSIS — M54.12 RADICULITIS, CERVICAL: ICD-10-CM

## 2023-02-09 DIAGNOSIS — M79.18 CERVICAL MYOFASCIAL PAIN SYNDROME: ICD-10-CM

## 2023-02-09 DIAGNOSIS — G44.039 EPISODIC PAROXYSMAL HEMICRANIA, NOT INTRACTABLE: ICD-10-CM

## 2023-02-09 DIAGNOSIS — G43.109 MIGRAINE WITH AURA AND WITHOUT STATUS MIGRAINOSUS, NOT INTRACTABLE: ICD-10-CM

## 2023-02-09 DIAGNOSIS — M54.2 NECK PAIN: ICD-10-CM

## 2023-02-09 RX ORDER — BUTALBITAL, ACETAMINOPHEN, CAFFEINE AND CODEINE PHOSPHATE 50; 325; 40; 30 MG/1; MG/1; MG/1; MG/1
1 CAPSULE ORAL EVERY 8 HOURS PRN
Qty: 60 CAPSULE | Refills: 0 | OUTPATIENT
Start: 2023-02-09 | End: 2023-03-01

## 2023-02-14 DIAGNOSIS — G43.109 MIGRAINE WITH AURA AND WITHOUT STATUS MIGRAINOSUS, NOT INTRACTABLE: ICD-10-CM

## 2023-02-14 RX ORDER — UBROGEPANT 100 MG/1
1 TABLET ORAL DAILY PRN
Qty: 16 TABLET | Refills: 11 | Status: SHIPPED | OUTPATIENT
Start: 2023-02-14 | End: 2023-02-15 | Stop reason: SDUPTHER

## 2023-02-14 NOTE — TELEPHONE ENCOUNTER
Received call/My Chart Message from patient requesting refill on medication(s). Pt was last seen in office on 2/7/2023  and has a follow up scheduled for 2/21/2023. Will send request to provider for authorization.      Requested Prescriptions     Pending Prescriptions Disp Refills    Ubrogepant (UBRELVY) 100 MG TABS 10 tablet 11     Sig: Take 1 tablet by mouth daily as needed (migraine)

## 2023-02-15 DIAGNOSIS — G43.109 MIGRAINE WITH AURA AND WITHOUT STATUS MIGRAINOSUS, NOT INTRACTABLE: ICD-10-CM

## 2023-02-15 RX ORDER — UBROGEPANT 100 MG/1
1 TABLET ORAL DAILY PRN
Qty: 16 TABLET | Refills: 11 | Status: SHIPPED | OUTPATIENT
Start: 2023-02-15

## 2023-02-15 NOTE — TELEPHONE ENCOUNTER
Received fax from pharmacy requesting refill on pts medication(s). Pt was last seen in office on 2/7/2023  and has a follow up scheduled for 2/21/2023. Will send request to  Zainab Palomo  for authorization.      Requested Prescriptions     Pending Prescriptions Disp Refills    Ubrogepant (UBRELVY) 100 MG TABS 16 tablet 11     Sig: Take 1 tablet by mouth daily as needed (migraine)

## 2023-02-15 NOTE — TELEPHONE ENCOUNTER
----- Message from Angel Louie sent at 2/14/2023  3:25 PM CST -----  Subject: Refill Request    QUESTIONS  Name of Medication? Other - UBRELVY  Patient-reported dosage and instructions? 1x per day as needed  How many days do you have left? 0  Preferred Pharmacy? [facility] phone number (if available)? [phone]  Additional Information for Provider? Patient is totally out of this   medication  ---------------------------------------------------------------------------  --------------  [address]  What is the best way for the office to contact you? OK to leave message on   voicemail,OK to respond with electronic message via [employer] portal (only   for patients who have registered [employer] account)  Preferred Call Back Phone Number? [phone]  ---------------------------------------------------------------------------  --------------  SCRIPT ANSWERS  Relationship to Patient?  Self

## 2023-02-20 ENCOUNTER — PATIENT MESSAGE (OUTPATIENT)
Dept: FAMILY MEDICINE CLINIC | Age: 23
End: 2023-02-20

## 2023-02-20 DIAGNOSIS — R35.0 URINARY FREQUENCY: Primary | ICD-10-CM

## 2023-02-20 RX ORDER — SULFAMETHOXAZOLE AND TRIMETHOPRIM 800; 160 MG/1; MG/1
1 TABLET ORAL 2 TIMES DAILY
Qty: 20 TABLET | Refills: 0 | Status: SHIPPED | OUTPATIENT
Start: 2023-02-20 | End: 2023-03-02

## 2023-02-20 NOTE — TELEPHONE ENCOUNTER
From: Iman Begin  To: Nnamdi Tripp  Sent: 2/20/2023 1:16 PM CST  Subject: Uti     I am coming in tomorrow but I have another UTI. Can I have medicine for this.        Thanks

## 2023-02-21 ENCOUNTER — OFFICE VISIT (OUTPATIENT)
Dept: FAMILY MEDICINE CLINIC | Age: 23
End: 2023-02-21
Payer: COMMERCIAL

## 2023-02-21 VITALS
OXYGEN SATURATION: 98 % | WEIGHT: 177.5 LBS | SYSTOLIC BLOOD PRESSURE: 130 MMHG | DIASTOLIC BLOOD PRESSURE: 86 MMHG | BODY MASS INDEX: 30.47 KG/M2 | HEART RATE: 93 BPM | TEMPERATURE: 98.1 F

## 2023-02-21 DIAGNOSIS — N30.00 ACUTE CYSTITIS WITHOUT HEMATURIA: Primary | ICD-10-CM

## 2023-02-21 DIAGNOSIS — N83.209 RUPTURED OVARIAN CYST: ICD-10-CM

## 2023-02-21 PROCEDURE — 99213 OFFICE O/P EST LOW 20 MIN: CPT | Performed by: NURSE PRACTITIONER

## 2023-02-21 RX ORDER — FLUCONAZOLE 150 MG/1
150 TABLET ORAL DAILY
Qty: 3 TABLET | Refills: 0 | Status: SHIPPED | OUTPATIENT
Start: 2023-02-21 | End: 2023-02-24

## 2023-02-21 ASSESSMENT — PATIENT HEALTH QUESTIONNAIRE - PHQ9
3. TROUBLE FALLING OR STAYING ASLEEP: 0
1. LITTLE INTEREST OR PLEASURE IN DOING THINGS: 0
4. FEELING TIRED OR HAVING LITTLE ENERGY: 0
SUM OF ALL RESPONSES TO PHQ QUESTIONS 1-9: 0
8. MOVING OR SPEAKING SO SLOWLY THAT OTHER PEOPLE COULD HAVE NOTICED. OR THE OPPOSITE, BEING SO FIGETY OR RESTLESS THAT YOU HAVE BEEN MOVING AROUND A LOT MORE THAN USUAL: 0
SUM OF ALL RESPONSES TO PHQ QUESTIONS 1-9: 0
SUM OF ALL RESPONSES TO PHQ9 QUESTIONS 1 & 2: 0
SUM OF ALL RESPONSES TO PHQ QUESTIONS 1-9: 0
2. FEELING DOWN, DEPRESSED OR HOPELESS: 0
10. IF YOU CHECKED OFF ANY PROBLEMS, HOW DIFFICULT HAVE THESE PROBLEMS MADE IT FOR YOU TO DO YOUR WORK, TAKE CARE OF THINGS AT HOME, OR GET ALONG WITH OTHER PEOPLE: 0
9. THOUGHTS THAT YOU WOULD BE BETTER OFF DEAD, OR OF HURTING YOURSELF: 0
5. POOR APPETITE OR OVEREATING: 0
SUM OF ALL RESPONSES TO PHQ QUESTIONS 1-9: 0
7. TROUBLE CONCENTRATING ON THINGS, SUCH AS READING THE NEWSPAPER OR WATCHING TELEVISION: 0
6. FEELING BAD ABOUT YOURSELF - OR THAT YOU ARE A FAILURE OR HAVE LET YOURSELF OR YOUR FAMILY DOWN: 0

## 2023-02-21 ASSESSMENT — ENCOUNTER SYMPTOMS
NAUSEA: 0
CONSTIPATION: 0
SHORTNESS OF BREATH: 0
ABDOMINAL PAIN: 0
VOMITING: 0
COUGH: 0
WHEEZING: 0

## 2023-02-21 NOTE — PROGRESS NOTES
Teo Sams (:  2000) is a 23 y.o. female,Established patient, here for evaluation of the following chief complaint(s):  Follow-up (From [facility] ER for ruptured ovarian cyst, possible kidney stone, possible swollen lymph nodes in intestines and UTI.)      ASSESSMENT/PLAN:    ICD-10-CM    1. Acute cystitis without hematuria  N30.00 Increase fluids  Supportive care   Cont bactrim improving      2. Ruptured ovarian cyst  N83.209 Monitor symptoms   Discussed           Return if symptoms worsen or fail to improve. SUBJECTIVE/OBJECTIVE:  HPI    Patient is here for RLQ pain follow up   Reports that she had to go to ER    She reports was RLQ and noted severe pain  Right before period started  And went to ER [facility]  CT scan : noted rupture ovarian cyst  Lymphadenopathy in the right pelvic pain  Reports the pain resolved  But feeling back to normal     Goes regularly BM  But notes worse around her period    Is set for guerda next Friday   Neurologist for headache work up and neck pain    Dysuria  Started cipro yesterday  Notes frequency and burning a few days ago  Notes not able to empty    /86 (Site: Right Upper Arm, Position: Sitting, Cuff Size: Large Adult)   Pulse 93   Temp 98.1 °F (36.7 °C) (Temporal)   Wt 177 lb 8 oz (80.5 kg)   SpO2 98%   BMI 30.47 kg/m²   Review of Systems   Constitutional:  Positive for activity change. Negative for appetite change, fatigue and fever. HENT:  Negative for postnasal drip. Respiratory:  Negative for cough, shortness of breath and wheezing. Cardiovascular:  Negative for chest pain, palpitations and leg swelling. Gastrointestinal:  Negative for abdominal pain, constipation, nausea and vomiting. Genitourinary:  Positive for dysuria, frequency, menstrual problem (right ovarian cyst) and urgency. Negative for difficulty urinating. Musculoskeletal:  Positive for arthralgias, myalgias, neck pain and neck stiffness. Skin:  Negative for rash. Neurological:  Positive for light-headedness and headaches. Negative for syncope and weakness. Hematological:  Negative for adenopathy. Psychiatric/Behavioral:  Negative for behavioral problems, self-injury and sleep disturbance. The patient is not nervous/anxious. Physical Exam  Vitals reviewed. Constitutional:       General: She is not in acute distress. Appearance: Normal appearance. She is not ill-appearing. HENT:      Head: Normocephalic. Right Ear: No middle ear effusion. There is no impacted cerumen. Left Ear:  No middle ear effusion. There is no impacted cerumen. Cardiovascular:      Rate and Rhythm: Normal rate and regular rhythm. Heart sounds: No murmur heard. Pulmonary:      Effort: Pulmonary effort is normal.      Breath sounds: No wheezing or rhonchi. Genitourinary:     Comments: Reports dysuria and burning    Musculoskeletal:         General: Tenderness (c spine with decreased ROM right hand numbness) present. Skin:     Capillary Refill: Capillary refill takes less than 2 seconds. Findings: No rash. Neurological:      General: No focal deficit present. Mental Status: She is alert and oriented to person, place, and time. Psychiatric:         Mood and Affect: Mood normal.         Behavior: Behavior normal.                 An electronic signature was used to authenticate this note.     --PATRICIA Suarez

## 2023-03-01 ENCOUNTER — PATIENT MESSAGE (OUTPATIENT)
Dept: FAMILY MEDICINE CLINIC | Age: 23
End: 2023-03-01

## 2023-03-01 DIAGNOSIS — G44.039 EPISODIC PAROXYSMAL HEMICRANIA, NOT INTRACTABLE: ICD-10-CM

## 2023-03-01 DIAGNOSIS — G43.109 MIGRAINE WITH AURA AND WITHOUT STATUS MIGRAINOSUS, NOT INTRACTABLE: ICD-10-CM

## 2023-03-01 DIAGNOSIS — M79.18 CERVICAL MYOFASCIAL PAIN SYNDROME: ICD-10-CM

## 2023-03-01 DIAGNOSIS — M54.2 NECK PAIN: ICD-10-CM

## 2023-03-01 DIAGNOSIS — M54.12 RADICULITIS, CERVICAL: ICD-10-CM

## 2023-03-01 NOTE — TELEPHONE ENCOUNTER
Received fax from pharmacy requesting refill on pts medication(s). Pt was last seen in office on 2/21/2023  and has a follow up scheduled for Visit date not found. Will send request to  Marcia Rivera  for authorization. Requested Prescriptions     Pending Prescriptions Disp Refills    butalbital-acetaminophen-caffeine-codeine (FIORICET WITH CODEINE) -63-30 MG per capsule 60 capsule 0     Sig: Take 1 capsule by mouth every 8 hours as needed for Headaches for up to 20 days.

## 2023-03-01 NOTE — TELEPHONE ENCOUNTER
From: Disha Hensley  To: Kelly Herndon  Sent: 3/1/2023 4:45 PM CST  Subject: Butal-Ace Refill     I am needing this refilled please. It is not showing on my prescriptions. Thank you.

## 2023-03-02 RX ORDER — BUTALBITAL, ACETAMINOPHEN, CAFFEINE AND CODEINE PHOSPHATE 50; 325; 40; 30 MG/1; MG/1; MG/1; MG/1
1 CAPSULE ORAL EVERY 8 HOURS PRN
Qty: 60 CAPSULE | Refills: 0 | Status: SHIPPED | OUTPATIENT
Start: 2023-03-02 | End: 2023-03-22

## 2023-03-17 DIAGNOSIS — M62.838 CERVICAL PARASPINAL MUSCLE SPASM: ICD-10-CM

## 2023-03-17 RX ORDER — AMITRIPTYLINE HYDROCHLORIDE 75 MG/1
75 TABLET, FILM COATED ORAL NIGHTLY
Qty: 90 TABLET | Refills: 0 | Status: SHIPPED | OUTPATIENT
Start: 2023-03-17

## 2023-03-17 NOTE — TELEPHONE ENCOUNTER
Received fax from pharmacy requesting refill on pts medication(s). Pt was last seen in office on 2/21/2023  and has a follow up scheduled for Visit date not found. Will send request to  Tisha Kern  for authorization.      Requested Prescriptions     Pending Prescriptions Disp Refills    amitriptyline (ELAVIL) 75 MG tablet [Pharmacy Med Name: Amitriptyline HCl 75 MG Oral Tablet] 90 tablet 0     Sig: Take 1 tablet by mouth nightly

## 2023-03-21 DIAGNOSIS — M79.18 CERVICAL MYOFASCIAL PAIN SYNDROME: ICD-10-CM

## 2023-03-21 DIAGNOSIS — G43.109 MIGRAINE WITH AURA AND WITHOUT STATUS MIGRAINOSUS, NOT INTRACTABLE: ICD-10-CM

## 2023-03-21 DIAGNOSIS — M54.12 RADICULITIS, CERVICAL: ICD-10-CM

## 2023-03-21 DIAGNOSIS — G44.039 EPISODIC PAROXYSMAL HEMICRANIA, NOT INTRACTABLE: ICD-10-CM

## 2023-03-21 DIAGNOSIS — M54.2 NECK PAIN: ICD-10-CM

## 2023-03-21 RX ORDER — BUTALBITAL, ACETAMINOPHEN, CAFFEINE AND CODEINE PHOSPHATE 50; 325; 40; 30 MG/1; MG/1; MG/1; MG/1
1 CAPSULE ORAL EVERY 8 HOURS PRN
Qty: 60 CAPSULE | Refills: 0 | OUTPATIENT
Start: 2023-03-21 | End: 2023-04-10

## 2023-03-21 NOTE — TELEPHONE ENCOUNTER
Received fax from pharmacy requesting refill on pts medication(s). Pt was last seen in office on 2/21/2023  and has a follow up scheduled for Visit date not found. Will send request to  Shayy Cunha  for authorization. Requested Prescriptions     Pending Prescriptions Disp Refills    butalbital-acetaminophen-caffeine-codeine (FIORICET WITH CODEINE) -50-30 MG per capsule 60 capsule 0     Sig: Take 1 capsule by mouth every 8 hours as needed for Headaches for up to 20 days.

## 2023-03-23 ENCOUNTER — TELEMEDICINE (OUTPATIENT)
Dept: FAMILY MEDICINE CLINIC | Age: 23
End: 2023-03-23
Payer: COMMERCIAL

## 2023-03-23 DIAGNOSIS — M79.18 CERVICAL MYOFASCIAL PAIN SYNDROME: Primary | ICD-10-CM

## 2023-03-23 DIAGNOSIS — M54.2 NECK PAIN: ICD-10-CM

## 2023-03-23 DIAGNOSIS — M54.12 RADICULITIS, CERVICAL: ICD-10-CM

## 2023-03-23 DIAGNOSIS — G44.039 EPISODIC PAROXYSMAL HEMICRANIA, NOT INTRACTABLE: ICD-10-CM

## 2023-03-23 DIAGNOSIS — G43.109 MIGRAINE WITH AURA AND WITHOUT STATUS MIGRAINOSUS, NOT INTRACTABLE: ICD-10-CM

## 2023-03-23 PROCEDURE — 99213 OFFICE O/P EST LOW 20 MIN: CPT | Performed by: NURSE PRACTITIONER

## 2023-03-23 RX ORDER — BUTALBITAL, ACETAMINOPHEN, CAFFEINE AND CODEINE PHOSPHATE 50; 325; 40; 30 MG/1; MG/1; MG/1; MG/1
1 CAPSULE ORAL EVERY 8 HOURS PRN
Qty: 60 CAPSULE | Refills: 0 | Status: SHIPPED | OUTPATIENT
Start: 2023-03-23 | End: 2023-04-17

## 2023-03-23 ASSESSMENT — ENCOUNTER SYMPTOMS
SHORTNESS OF BREATH: 0
ABDOMINAL PAIN: 0
WHEEZING: 0
VOMITING: 0
NAUSEA: 0
CONSTIPATION: 0
COUGH: 0
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
## 2023-03-24 ENCOUNTER — PATIENT MESSAGE (OUTPATIENT)
Dept: FAMILY MEDICINE CLINIC | Age: 23
End: 2023-03-24

## 2023-03-24 RX ORDER — OFLOXACIN 3 MG/ML
5 SOLUTION AURICULAR (OTIC) 2 TIMES DAILY
Qty: 10 ML | Refills: 0 | Status: SHIPPED | OUTPATIENT
Start: 2023-03-24 | End: 2023-04-03

## 2023-03-24 NOTE — TELEPHONE ENCOUNTER
From: Korina Jean  To: Angélica Resendiz  Sent: 3/24/2023 10:42 AM CDT  Subject: Izzy Streeter     Can you have Izzy Streeter give me a call at her convenience?

## 2023-04-04 DIAGNOSIS — G43.109 MIGRAINE WITH AURA AND WITHOUT STATUS MIGRAINOSUS, NOT INTRACTABLE: ICD-10-CM

## 2023-04-04 RX ORDER — UBROGEPANT 100 MG/1
1 TABLET ORAL DAILY PRN
Qty: 16 TABLET | Refills: 11 | Status: SHIPPED | OUTPATIENT
Start: 2023-04-04

## 2023-04-04 NOTE — TELEPHONE ENCOUNTER
Received fax from pharmacy requesting refill on pts medication(s). Pt was last seen in office on 3/23/2023  and has a follow up scheduled for 4/25/2023. Will send request to  Cherylene Eisenmenger  for authorization.      Requested Prescriptions     Pending Prescriptions Disp Refills    Ubrogepant (UBRELVY) 100 MG TABS 16 tablet 11     Sig: Take 1 tablet by mouth daily as needed (migraine)

## 2023-04-25 ENCOUNTER — OFFICE VISIT (OUTPATIENT)
Dept: FAMILY MEDICINE CLINIC | Age: 23
End: 2023-04-25
Payer: COMMERCIAL

## 2023-04-25 VITALS
DIASTOLIC BLOOD PRESSURE: 82 MMHG | BODY MASS INDEX: 29.61 KG/M2 | TEMPERATURE: 97.8 F | HEART RATE: 98 BPM | WEIGHT: 172.5 LBS | SYSTOLIC BLOOD PRESSURE: 120 MMHG | OXYGEN SATURATION: 98 %

## 2023-04-25 DIAGNOSIS — G44.039 EPISODIC PAROXYSMAL HEMICRANIA, NOT INTRACTABLE: ICD-10-CM

## 2023-04-25 DIAGNOSIS — M54.12 RADICULITIS, CERVICAL: ICD-10-CM

## 2023-04-25 DIAGNOSIS — M54.2 NECK PAIN: ICD-10-CM

## 2023-04-25 DIAGNOSIS — M79.18 CERVICAL MYOFASCIAL PAIN SYNDROME: ICD-10-CM

## 2023-04-25 DIAGNOSIS — G43.109 MIGRAINE WITH AURA AND WITHOUT STATUS MIGRAINOSUS, NOT INTRACTABLE: ICD-10-CM

## 2023-04-25 PROCEDURE — 99214 OFFICE O/P EST MOD 30 MIN: CPT | Performed by: NURSE PRACTITIONER

## 2023-04-25 RX ORDER — AMOXICILLIN 500 MG/1
500 CAPSULE ORAL 2 TIMES DAILY
Qty: 20 CAPSULE | Refills: 0 | Status: SHIPPED | OUTPATIENT
Start: 2023-04-25 | End: 2023-05-05

## 2023-04-25 RX ORDER — BUTALBITAL, ACETAMINOPHEN, CAFFEINE AND CODEINE PHOSPHATE 50; 325; 40; 30 MG/1; MG/1; MG/1; MG/1
1 CAPSULE ORAL EVERY 8 HOURS PRN
Qty: 60 CAPSULE | Refills: 0 | Status: SHIPPED | OUTPATIENT
Start: 2023-04-25 | End: 2023-05-20

## 2023-04-25 ASSESSMENT — ENCOUNTER SYMPTOMS
ABDOMINAL PAIN: 0
COUGH: 0
WHEEZING: 0
VOMITING: 0
SHORTNESS OF BREATH: 0
NAUSEA: 0
CONSTIPATION: 0

## 2023-04-25 NOTE — PROGRESS NOTES
Svetlana Eddy (:  2000) is a 23 y.o. female,Established patient, here for evaluation of the following chief complaint(s):  Follow-up (For migraines )      ASSESSMENT/PLAN:    ICD-10-CM    1. Cervical myofascial pain syndrome  M79.18 butalbital-acetaminophen-caffeine-codeine (FIORICET WITH CODEINE) -42-30 MG per capsule  Cont with neurology  Urbelvy versus nurtec        2. Migraine with aura and without status migrainosus, not intractable  G43.109 butalbital-acetaminophen-caffeine-codeine (FIORICET WITH CODEINE) -26-30 MG per capsule      3. Episodic paroxysmal hemicrania, not intractable  G44.039 butalbital-acetaminophen-caffeine-codeine (FIORICET WITH CODEINE) -68-30 MG per capsule      4. Neck pain  M54.2 butalbital-acetaminophen-caffeine-codeine (FIORICET WITH CODEINE) -72-30 MG per capsule  Cont present        5. Radiculitis, cervical  M54.12 butalbital-acetaminophen-caffeine-codeine (FIORICET WITH CODEINE) -30-30 MG per capsule          Return in about 1 month (around 2023) for 1 month medication follow up.     SUBJECTIVE/OBJECTIVE:  HPI    Patient is here for migraine follow up  She went to [facility] for headaches  Reports that had seen the neck specialist   And neurology   Reports she notes the past week has been rough   With stress of losing a kid from school  And stress   Reports that she notes stress exaberates it    Neurology botox injections   For the neck and trapezius   Total of 160 units   Will see again in      Present plan   On will use ubrelevy if needed  Or nurtec depending on which gets    Reports is trying to get the nurtec every other day  And neurontin as needed  Zanaflex at night   And weaning down on esgic with codeine   But is trying to get down on use  Typically esgic #60 no refill last filled 3/23 48 ( was improved in December )     Controlled Substance Monitoring:    Acute and Chronic Pain Monitoring:   RX Monitoring 2023   Periodic

## 2023-04-30 ENCOUNTER — PATIENT MESSAGE (OUTPATIENT)
Dept: FAMILY MEDICINE CLINIC | Age: 23
End: 2023-04-30

## 2023-05-01 RX ORDER — AZITHROMYCIN 250 MG/1
250 TABLET, FILM COATED ORAL SEE ADMIN INSTRUCTIONS
Qty: 6 TABLET | Refills: 0 | Status: SHIPPED | OUTPATIENT
Start: 2023-05-01 | End: 2023-05-06

## 2023-05-08 DIAGNOSIS — F33.0 MILD EPISODE OF RECURRENT MAJOR DEPRESSIVE DISORDER (HCC): ICD-10-CM

## 2023-05-08 DIAGNOSIS — F41.1 GAD (GENERALIZED ANXIETY DISORDER): ICD-10-CM

## 2023-05-08 RX ORDER — TIZANIDINE 4 MG/1
4 TABLET ORAL EVERY 8 HOURS PRN
Qty: 60 TABLET | Refills: 1 | Status: SHIPPED | OUTPATIENT
Start: 2023-05-08

## 2023-05-08 RX ORDER — VENLAFAXINE HYDROCHLORIDE 75 MG/1
75 CAPSULE, EXTENDED RELEASE ORAL DAILY
Qty: 30 CAPSULE | Refills: 11 | Status: SHIPPED | OUTPATIENT
Start: 2023-05-08

## 2023-05-08 NOTE — TELEPHONE ENCOUNTER
Received fax from pharmacy requesting refill on pts medication(s). Pt was last seen in office on 4/25/2023  and has a follow up scheduled for Visit date not found. Will send request to  Kole Mccall  for authorization.      Requested Prescriptions     Pending Prescriptions Disp Refills    tiZANidine (ZANAFLEX) 4 MG tablet 60 tablet 1     Sig: Take 1 tablet by mouth every 8 hours as needed (1/2 to 1 tid prn)

## 2023-05-08 NOTE — TELEPHONE ENCOUNTER
Received fax from pharmacy requesting refill on pts medication(s). Pt was last seen in office on 4/25/2023  and has a follow up scheduled for 5/8/2023. Will send request to  Russ Rudolph  for authorization.      Requested Prescriptions     Pending Prescriptions Disp Refills    venlafaxine (EFFEXOR XR) 75 MG extended release capsule 30 capsule 11     Sig: Take 1 capsule by mouth daily

## 2023-05-15 ENCOUNTER — PATIENT MESSAGE (OUTPATIENT)
Dept: FAMILY MEDICINE CLINIC | Age: 23
End: 2023-05-15

## 2023-05-15 DIAGNOSIS — J02.0 STREP THROAT: Primary | ICD-10-CM

## 2023-05-15 RX ORDER — AZITHROMYCIN 250 MG/1
250 TABLET, FILM COATED ORAL SEE ADMIN INSTRUCTIONS
Qty: 6 TABLET | Refills: 0 | Status: SHIPPED | OUTPATIENT
Start: 2023-05-15 | End: 2023-05-20

## 2023-05-15 NOTE — TELEPHONE ENCOUNTER
From: Iman Rider  To: Nnamdi Tripp  Sent: 5/15/2023 6:04 AM CDT  Subject: Strep    I have strep again, I have white spots in my throat. Can I have an antibiotic or whatever you gave me last time?      Thank you so much

## 2023-05-19 DIAGNOSIS — M62.838 CERVICAL PARASPINAL MUSCLE SPASM: ICD-10-CM

## 2023-05-19 DIAGNOSIS — G43.109 MIGRAINE WITH AURA AND WITHOUT STATUS MIGRAINOSUS, NOT INTRACTABLE: ICD-10-CM

## 2023-05-19 RX ORDER — UBROGEPANT 100 MG/1
1 TABLET ORAL DAILY PRN
Qty: 16 TABLET | Refills: 11 | Status: SHIPPED | OUTPATIENT
Start: 2023-05-19

## 2023-05-19 RX ORDER — AMITRIPTYLINE HYDROCHLORIDE 75 MG/1
75 TABLET, FILM COATED ORAL NIGHTLY
Qty: 90 TABLET | Refills: 3 | Status: SHIPPED | OUTPATIENT
Start: 2023-05-19

## 2023-05-19 NOTE — TELEPHONE ENCOUNTER
Received fax from pharmacy requesting refill on pts medication(s). Pt was last seen in office on 4/25/2023  and has a follow up scheduled for Visit date not found. Will send request to  Ambrocio Bell  for authorization.      Requested Prescriptions     Pending Prescriptions Disp Refills    Ubrogepant (UBRELVY) 100 MG TABS 16 tablet 11     Sig: Take 1 tablet by mouth daily as needed (migraine)

## 2023-06-02 ENCOUNTER — OFFICE VISIT (OUTPATIENT)
Dept: FAMILY MEDICINE CLINIC | Age: 23
End: 2023-06-02

## 2023-06-02 VITALS
DIASTOLIC BLOOD PRESSURE: 80 MMHG | TEMPERATURE: 98.1 F | HEART RATE: 100 BPM | WEIGHT: 174.75 LBS | SYSTOLIC BLOOD PRESSURE: 118 MMHG | OXYGEN SATURATION: 98 % | BODY MASS INDEX: 30 KG/M2

## 2023-06-02 DIAGNOSIS — M54.2 NECK PAIN: ICD-10-CM

## 2023-06-02 DIAGNOSIS — M79.18 CERVICAL MYOFASCIAL PAIN SYNDROME: ICD-10-CM

## 2023-06-02 DIAGNOSIS — G44.039 EPISODIC PAROXYSMAL HEMICRANIA, NOT INTRACTABLE: ICD-10-CM

## 2023-06-02 DIAGNOSIS — M62.838 CERVICAL PARASPINAL MUSCLE SPASM: ICD-10-CM

## 2023-06-02 DIAGNOSIS — M54.12 RADICULITIS, CERVICAL: ICD-10-CM

## 2023-06-02 DIAGNOSIS — G43.109 MIGRAINE WITH AURA AND WITHOUT STATUS MIGRAINOSUS, NOT INTRACTABLE: ICD-10-CM

## 2023-06-02 RX ORDER — ATOGEPANT 30 MG/1
1 TABLET ORAL DAILY
Qty: 30 TABLET | Refills: 11 | Status: SHIPPED | OUTPATIENT
Start: 2023-06-02

## 2023-06-02 RX ORDER — ONDANSETRON 4 MG/1
4 TABLET, ORALLY DISINTEGRATING ORAL 3 TIMES DAILY PRN
Qty: 21 TABLET | Refills: 2 | Status: SHIPPED | OUTPATIENT
Start: 2023-06-02

## 2023-06-02 RX ORDER — BUTALBITAL, ACETAMINOPHEN, CAFFEINE AND CODEINE PHOSPHATE 50; 325; 40; 30 MG/1; MG/1; MG/1; MG/1
1 CAPSULE ORAL EVERY 8 HOURS PRN
Qty: 60 CAPSULE | Refills: 0 | Status: SHIPPED | OUTPATIENT
Start: 2023-06-02 | End: 2023-06-27

## 2023-06-02 ASSESSMENT — ENCOUNTER SYMPTOMS
NAUSEA: 0
CONSTIPATION: 0
SHORTNESS OF BREATH: 0
VOMITING: 0
COUGH: 0
WHEEZING: 0
ABDOMINAL PAIN: 0

## 2023-06-02 NOTE — PROGRESS NOTES
HENT:      Head: Normocephalic. Right Ear: No middle ear effusion. There is no impacted cerumen. Left Ear:  No middle ear effusion. There is no impacted cerumen. Cardiovascular:      Rate and Rhythm: Normal rate and regular rhythm. Heart sounds: No murmur heard. Pulmonary:      Effort: Pulmonary effort is normal.      Breath sounds: No wheezing or rhonchi. Genitourinary:     Comments:       Musculoskeletal:         General: Tenderness (c spine with decreased ROM right hand numbness) present. Skin:     Capillary Refill: Capillary refill takes less than 2 seconds. Findings: No rash. Neurological:      General: No focal deficit present. Mental Status: She is alert and oriented to person, place, and time. Psychiatric:         Mood and Affect: Mood normal.         Behavior: Behavior normal.                 An electronic signature was used to authenticate this note.     --PATRICIA Sylvester

## 2023-06-05 DIAGNOSIS — G43.109 MIGRAINE WITH AURA AND WITHOUT STATUS MIGRAINOSUS, NOT INTRACTABLE: Primary | ICD-10-CM

## 2023-06-05 RX ORDER — ERENUMAB-AOOE 70 MG/ML
70 INJECTION SUBCUTANEOUS
Qty: 1 ADJUSTABLE DOSE PRE-FILLED PEN SYRINGE | Refills: 0 | Status: SHIPPED | OUTPATIENT
Start: 2023-06-05

## 2023-06-05 NOTE — TELEPHONE ENCOUNTER
[employer] received a request for coverage of Qulipta Tablet for you. This is the  initial adverse determination for this request. The request was denied because: You do not meet the requirements of your plan. Your plan covers this drug when your doctor provides documentation that you meet any of  these conditions:  - You have a clinical condition for which there is no appropriate formulary alternative  - You need a form of the drug that is not on the formulary  - You tried the required number of preferred formulary alternatives on your formulary first.  These drugs did not work for you or you cannot take them. Your request has been denied based on the information we have. Formulary alternative(s) are Aimovig, Emgality. Requirement: 3 in a class with 3 or more  alternatives, 2 in a class with 2 alternatives, or 1 in a class with only 1 alternative. Please  refer to your plan documents for a complete list of alternatives. Note: Formulary alternatives may require a prior authorization. Your prescriber will be  responsible for determining what alternative is appropriate for you.

## 2023-06-08 DIAGNOSIS — M79.18 CERVICAL MYOFASCIAL PAIN SYNDROME: ICD-10-CM

## 2023-06-08 DIAGNOSIS — G43.109 MIGRAINE WITH AURA AND WITHOUT STATUS MIGRAINOSUS, NOT INTRACTABLE: ICD-10-CM

## 2023-06-08 DIAGNOSIS — M54.2 NECK PAIN: ICD-10-CM

## 2023-06-08 DIAGNOSIS — G44.039 EPISODIC PAROXYSMAL HEMICRANIA, NOT INTRACTABLE: ICD-10-CM

## 2023-06-08 DIAGNOSIS — M54.12 RADICULITIS, CERVICAL: ICD-10-CM

## 2023-06-08 RX ORDER — BUTALBITAL, ACETAMINOPHEN, CAFFEINE AND CODEINE PHOSPHATE 50; 325; 40; 30 MG/1; MG/1; MG/1; MG/1
1 CAPSULE ORAL EVERY 8 HOURS PRN
Qty: 60 CAPSULE | Refills: 0 | OUTPATIENT
Start: 2023-06-08 | End: 2023-07-03

## 2023-06-12 ENCOUNTER — PATIENT MESSAGE (OUTPATIENT)
Dept: FAMILY MEDICINE CLINIC | Age: 23
End: 2023-06-12

## 2023-06-12 DIAGNOSIS — N39.0 URINARY TRACT INFECTION WITHOUT HEMATURIA, SITE UNSPECIFIED: Primary | ICD-10-CM

## 2023-06-22 RX ORDER — CEFDINIR 300 MG/1
300 CAPSULE ORAL 2 TIMES DAILY
Qty: 20 CAPSULE | Refills: 0 | Status: SHIPPED | OUTPATIENT
Start: 2023-06-22 | End: 2023-07-02

## 2023-06-29 DIAGNOSIS — G43.109 MIGRAINE WITH AURA AND WITHOUT STATUS MIGRAINOSUS, NOT INTRACTABLE: ICD-10-CM

## 2023-06-29 DIAGNOSIS — M62.838 CERVICAL PARASPINAL MUSCLE SPASM: ICD-10-CM

## 2023-06-29 RX ORDER — UBROGEPANT 100 MG/1
1 TABLET ORAL DAILY PRN
Qty: 16 TABLET | Refills: 11 | Status: SHIPPED | OUTPATIENT
Start: 2023-06-29

## 2023-06-29 RX ORDER — ONDANSETRON 4 MG/1
4 TABLET, ORALLY DISINTEGRATING ORAL 3 TIMES DAILY PRN
Qty: 21 TABLET | Refills: 2 | Status: SHIPPED | OUTPATIENT
Start: 2023-06-29

## 2023-07-02 ENCOUNTER — PATIENT MESSAGE (OUTPATIENT)
Dept: FAMILY MEDICINE CLINIC | Age: 23
End: 2023-07-02

## 2023-07-02 DIAGNOSIS — G43.109 MIGRAINE WITH AURA AND WITHOUT STATUS MIGRAINOSUS, NOT INTRACTABLE: ICD-10-CM

## 2023-07-02 DIAGNOSIS — M79.18 CERVICAL MYOFASCIAL PAIN SYNDROME: ICD-10-CM

## 2023-07-02 DIAGNOSIS — G44.039 EPISODIC PAROXYSMAL HEMICRANIA, NOT INTRACTABLE: ICD-10-CM

## 2023-07-02 DIAGNOSIS — M54.2 NECK PAIN: ICD-10-CM

## 2023-07-02 DIAGNOSIS — M54.12 RADICULITIS, CERVICAL: ICD-10-CM

## 2023-07-03 RX ORDER — BUTALBITAL, ACETAMINOPHEN, CAFFEINE AND CODEINE PHOSPHATE 50; 325; 40; 30 MG/1; MG/1; MG/1; MG/1
1 CAPSULE ORAL EVERY 8 HOURS PRN
Qty: 60 CAPSULE | Refills: 0 | Status: SHIPPED | OUTPATIENT
Start: 2023-07-03 | End: 2023-07-05 | Stop reason: SDUPTHER

## 2023-07-03 NOTE — TELEPHONE ENCOUNTER
From: Jarek Neville  To: Ish Burks  Sent: 7/2/2023 8:29 PM CDT  Subject: Rick Rock     I am needing this refilled. I have an appt on Friday 7/7.

## 2023-07-05 ENCOUNTER — TELEPHONE (OUTPATIENT)
Dept: FAMILY MEDICINE CLINIC | Age: 23
End: 2023-07-05

## 2023-07-05 DIAGNOSIS — M54.2 NECK PAIN: ICD-10-CM

## 2023-07-05 DIAGNOSIS — M79.18 CERVICAL MYOFASCIAL PAIN SYNDROME: ICD-10-CM

## 2023-07-05 DIAGNOSIS — M54.12 RADICULITIS, CERVICAL: ICD-10-CM

## 2023-07-05 DIAGNOSIS — G44.039 EPISODIC PAROXYSMAL HEMICRANIA, NOT INTRACTABLE: ICD-10-CM

## 2023-07-05 DIAGNOSIS — G43.109 MIGRAINE WITH AURA AND WITHOUT STATUS MIGRAINOSUS, NOT INTRACTABLE: ICD-10-CM

## 2023-07-05 RX ORDER — BUTALBITAL, ACETAMINOPHEN, CAFFEINE AND CODEINE PHOSPHATE 50; 325; 40; 30 MG/1; MG/1; MG/1; MG/1
1 CAPSULE ORAL EVERY 8 HOURS PRN
Qty: 60 CAPSULE | Refills: 0 | Status: SHIPPED | OUTPATIENT
Start: 2023-07-05 | End: 2023-07-30

## 2023-07-05 NOTE — TELEPHONE ENCOUNTER
Patient called in stating that the last two antibiotics for her UTI have not helped, she is still having burning with urination. She was going to discuss with PCP at her appt on Friday but this appt had to be moved due to PCP being out of office for family emergency. Please advise.

## 2023-07-05 NOTE — TELEPHONE ENCOUNTER
Pt last seen 6/2/2023  Pt last filled 6/3/2023     Pt is needing refill due to PCP being out of office for family emergency. Requested Prescriptions     Pending Prescriptions Disp Refills    butalbital-acetaminophen-caffeine-codeine (FIORICET WITH CODEINE) -79-30 MG per capsule 60 capsule 0     Sig: Take 1 capsule by mouth every 8 hours as needed for Headaches for up to 25 days. Max Daily Amount: 3 capsules         SUZI was reviewed today per office protocol. Report shows No discrepancies. Fill pattern is consistent from single provider(s) at single pharmacy(s).

## 2023-07-25 DIAGNOSIS — N92.6 IRREGULAR PERIODS: ICD-10-CM

## 2023-07-25 DIAGNOSIS — M54.2 NECK PAIN: ICD-10-CM

## 2023-07-25 DIAGNOSIS — G44.039 EPISODIC PAROXYSMAL HEMICRANIA, NOT INTRACTABLE: ICD-10-CM

## 2023-07-25 DIAGNOSIS — G43.109 MIGRAINE WITH AURA AND WITHOUT STATUS MIGRAINOSUS, NOT INTRACTABLE: ICD-10-CM

## 2023-07-25 DIAGNOSIS — M79.18 CERVICAL MYOFASCIAL PAIN SYNDROME: ICD-10-CM

## 2023-07-25 DIAGNOSIS — M54.12 RADICULITIS, CERVICAL: ICD-10-CM

## 2023-07-25 RX ORDER — BUTALBITAL, ACETAMINOPHEN, CAFFEINE AND CODEINE PHOSPHATE 50; 325; 40; 30 MG/1; MG/1; MG/1; MG/1
1 CAPSULE ORAL EVERY 8 HOURS PRN
Qty: 60 CAPSULE | Refills: 0 | Status: SHIPPED | OUTPATIENT
Start: 2023-07-25 | End: 2023-08-19

## 2023-07-25 RX ORDER — NORGESTIMATE AND ETHINYL ESTRADIOL 7DAYSX3 LO
1 KIT ORAL DAILY
Qty: 3 PACKET | Refills: 3 | Status: SHIPPED | OUTPATIENT
Start: 2023-07-25

## 2023-07-25 RX ORDER — UBROGEPANT 100 MG/1
1 TABLET ORAL DAILY PRN
Qty: 16 TABLET | Refills: 11 | Status: SHIPPED | OUTPATIENT
Start: 2023-07-25

## 2023-07-25 NOTE — TELEPHONE ENCOUNTER
Received fax from pharmacy requesting refill on pts medication(s). Pt was last seen in office on 6/2/2023  and has a follow up scheduled for 7/25/2023. Will send request to  Dr. Favian Segura  for authorization. Requested Prescriptions     Pending Prescriptions Disp Refills    butalbital-acetaminophen-caffeine-codeine (FIORICET WITH CODEINE) -89-30 MG per capsule 60 capsule 0     Sig: Take 1 capsule by mouth every 8 hours as needed for Headaches for up to 25 days.  Max Daily Amount: 3 capsules

## 2023-07-25 NOTE — TELEPHONE ENCOUNTER
Received fax from pharmacy requesting refill on pts medication(s). Pt was last seen in office on 6/2/2023  and has a follow up scheduled for 8/1/2023. Will send request to  Mary Benz  for patient.      Requested Prescriptions     Pending Prescriptions Disp Refills    Norgestim-Eth Estrad Triphasic (TRI-LO-DYLLAN) 0.18/0.215/0.25 MG-25 MCG TABS 3 packet 3     Sig: Take 1 tablet by mouth daily    Ubrogepant (UBRELVY) 100 MG TABS 16 tablet 11     Sig: Take 1 tablet by mouth daily as needed (migraine)

## 2023-08-18 ENCOUNTER — OFFICE VISIT (OUTPATIENT)
Dept: FAMILY MEDICINE CLINIC | Age: 23
End: 2023-08-18

## 2023-08-18 VITALS
HEART RATE: 115 BPM | OXYGEN SATURATION: 98 % | WEIGHT: 175.75 LBS | BODY MASS INDEX: 30 KG/M2 | HEIGHT: 64 IN | TEMPERATURE: 97.9 F | DIASTOLIC BLOOD PRESSURE: 82 MMHG | SYSTOLIC BLOOD PRESSURE: 122 MMHG

## 2023-08-18 DIAGNOSIS — M62.838 CERVICAL PARASPINAL MUSCLE SPASM: ICD-10-CM

## 2023-08-18 DIAGNOSIS — G43.109 MIGRAINE WITH AURA AND WITHOUT STATUS MIGRAINOSUS, NOT INTRACTABLE: ICD-10-CM

## 2023-08-18 DIAGNOSIS — M79.18 CERVICAL MYOFASCIAL PAIN SYNDROME: ICD-10-CM

## 2023-08-18 DIAGNOSIS — F33.0 MILD EPISODE OF RECURRENT MAJOR DEPRESSIVE DISORDER (HCC): ICD-10-CM

## 2023-08-18 DIAGNOSIS — F41.1 GAD (GENERALIZED ANXIETY DISORDER): ICD-10-CM

## 2023-08-18 DIAGNOSIS — M54.2 NECK PAIN: ICD-10-CM

## 2023-08-18 DIAGNOSIS — M54.12 RADICULITIS, CERVICAL: ICD-10-CM

## 2023-08-18 DIAGNOSIS — G44.039 EPISODIC PAROXYSMAL HEMICRANIA, NOT INTRACTABLE: ICD-10-CM

## 2023-08-18 RX ORDER — TIZANIDINE 4 MG/1
4 TABLET ORAL EVERY 8 HOURS PRN
Qty: 60 TABLET | Refills: 1 | Status: SHIPPED | OUTPATIENT
Start: 2023-08-18

## 2023-08-18 RX ORDER — AMITRIPTYLINE HYDROCHLORIDE 75 MG/1
75 TABLET ORAL NIGHTLY
Qty: 90 TABLET | Refills: 3 | Status: SHIPPED | OUTPATIENT
Start: 2023-08-18

## 2023-08-18 RX ORDER — SERTRALINE HYDROCHLORIDE 100 MG/1
150 TABLET, FILM COATED ORAL DAILY
Qty: 45 TABLET | Refills: 11 | Status: SHIPPED | OUTPATIENT
Start: 2023-08-18

## 2023-08-18 RX ORDER — BUTALBITAL, ACETAMINOPHEN, CAFFEINE AND CODEINE PHOSPHATE 50; 325; 40; 30 MG/1; MG/1; MG/1; MG/1
1 CAPSULE ORAL EVERY 8 HOURS PRN
Qty: 60 CAPSULE | Refills: 0 | Status: SHIPPED | OUTPATIENT
Start: 2023-08-18 | End: 2023-09-12

## 2023-08-18 RX ORDER — AMOXICILLIN 875 MG/1
875 TABLET, COATED ORAL 2 TIMES DAILY
Qty: 20 TABLET | Refills: 0 | Status: SHIPPED | OUTPATIENT
Start: 2023-08-18 | End: 2023-08-28

## 2023-08-18 RX ORDER — UBROGEPANT 100 MG/1
1 TABLET ORAL DAILY PRN
Qty: 16 TABLET | Refills: 11 | Status: SHIPPED | OUTPATIENT
Start: 2023-08-18

## 2023-08-18 RX ORDER — SERTRALINE HYDROCHLORIDE 100 MG/1
1.5 TABLET, FILM COATED ORAL DAILY
COMMUNITY
Start: 2023-05-24 | End: 2023-08-18 | Stop reason: SDUPTHER

## 2023-08-18 ASSESSMENT — ENCOUNTER SYMPTOMS
WHEEZING: 0
COUGH: 0
SHORTNESS OF BREATH: 0
NAUSEA: 0
VOMITING: 0
ABDOMINAL PAIN: 0
CONSTIPATION: 0

## 2023-08-18 NOTE — PROGRESS NOTES
Neyda Walden (:  2000) is a 23 y.o. female,Established patient, here for evaluation of the following chief complaint(s):  Migraine (Two weeks ago bad migraine, had to be carried to car, chiropractor adjusted her and referred her to ortho ins but patient currently uninsured )      ASSESSMENT/PLAN:    ICD-10-CM    1. Cervical myofascial pain syndrome  M79.18 butalbital-acetaminophen-caffeine-codeine (FIORICET WITH CODEINE) -91-30 MG per capsule  Cont present  With neurology        2. Migraine with aura and without status migrainosus, not intractable  G43.109 butalbital-acetaminophen-caffeine-codeine (FIORICET WITH CODEINE) -73-30 MG per capsule     Ubrogepant (UBRELVY) 100 MG TABS  Cont every other day        3. Episodic paroxysmal hemicrania, not intractable  G44.039 butalbital-acetaminophen-caffeine-codeine (FIORICET WITH CODEINE) -47-30 MG per capsule      4. Neck pain  M54.2 butalbital-acetaminophen-caffeine-codeine (FIORICET WITH CODEINE) -01-30 MG per capsule     tiZANidine (ZANAFLEX) 4 MG tablet      5. Radiculitis, cervical  M54.12 butalbital-acetaminophen-caffeine-codeine (FIORICET WITH CODEINE) -89-30 MG per capsule     tiZANidine (ZANAFLEX) 4 MG tablet  Cont present        6. Cervical paraspinal muscle spasm  M62.838 amitriptyline (ELAVIL) 75 MG tablet     tiZANidine (ZANAFLEX) 4 MG tablet      7. WILLIAM (generalized anxiety disorder)  F41.1 sertraline (ZOLOFT) 100 MG tablet      8. Mild episode of recurrent major depressive disorder (HCC)  F33.0 sertraline (ZOLOFT) 100 MG tablet          Return in about 1 month (around 2023) for 1 month medication follow up.     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

## 2023-09-01 DIAGNOSIS — M62.838 CERVICAL PARASPINAL MUSCLE SPASM: ICD-10-CM

## 2023-09-01 RX ORDER — ONDANSETRON 4 MG/1
4 TABLET, ORALLY DISINTEGRATING ORAL 3 TIMES DAILY PRN
Qty: 21 TABLET | Refills: 2 | Status: SHIPPED | OUTPATIENT
Start: 2023-09-01

## 2023-09-08 ENCOUNTER — TELEPHONE (OUTPATIENT)
Dept: FAMILY MEDICINE CLINIC | Age: 23
End: 2023-09-08

## 2023-09-08 RX ORDER — SULFAMETHOXAZOLE AND TRIMETHOPRIM 800; 160 MG/1; MG/1
1 TABLET ORAL 2 TIMES DAILY
Qty: 20 TABLET | Refills: 0 | Status: SHIPPED | OUTPATIENT
Start: 2023-09-08 | End: 2023-09-18

## 2023-09-08 RX ORDER — NITROFURANTOIN 25; 75 MG/1; MG/1
100 CAPSULE ORAL 2 TIMES DAILY
Qty: 20 CAPSULE | Refills: 0 | Status: SHIPPED | OUTPATIENT
Start: 2023-09-08 | End: 2023-09-18

## 2023-09-19 RX ORDER — NITROFURANTOIN 25; 75 MG/1; MG/1
100 CAPSULE ORAL 2 TIMES DAILY
Qty: 20 CAPSULE | Refills: 0 | Status: SHIPPED | OUTPATIENT
Start: 2023-09-19 | End: 2023-09-29

## 2023-09-21 ENCOUNTER — PATIENT MESSAGE (OUTPATIENT)
Dept: FAMILY MEDICINE CLINIC | Age: 23
End: 2023-09-21

## 2023-09-21 DIAGNOSIS — M54.2 NECK PAIN: ICD-10-CM

## 2023-09-21 DIAGNOSIS — G44.039 EPISODIC PAROXYSMAL HEMICRANIA, NOT INTRACTABLE: ICD-10-CM

## 2023-09-21 DIAGNOSIS — G43.109 MIGRAINE WITH AURA AND WITHOUT STATUS MIGRAINOSUS, NOT INTRACTABLE: ICD-10-CM

## 2023-09-21 DIAGNOSIS — M54.12 RADICULITIS, CERVICAL: ICD-10-CM

## 2023-09-21 DIAGNOSIS — M79.18 CERVICAL MYOFASCIAL PAIN SYNDROME: ICD-10-CM

## 2023-09-22 DIAGNOSIS — M54.12 RADICULITIS, CERVICAL: ICD-10-CM

## 2023-09-22 DIAGNOSIS — M54.2 NECK PAIN: ICD-10-CM

## 2023-09-22 DIAGNOSIS — G44.039 EPISODIC PAROXYSMAL HEMICRANIA, NOT INTRACTABLE: ICD-10-CM

## 2023-09-22 DIAGNOSIS — M79.18 CERVICAL MYOFASCIAL PAIN SYNDROME: ICD-10-CM

## 2023-09-22 DIAGNOSIS — G43.109 MIGRAINE WITH AURA AND WITHOUT STATUS MIGRAINOSUS, NOT INTRACTABLE: Primary | ICD-10-CM

## 2023-09-22 DIAGNOSIS — G43.109 MIGRAINE WITH AURA AND WITHOUT STATUS MIGRAINOSUS, NOT INTRACTABLE: ICD-10-CM

## 2023-09-22 RX ORDER — BUTALBITAL, ACETAMINOPHEN, CAFFEINE AND CODEINE PHOSPHATE 50; 325; 40; 30 MG/1; MG/1; MG/1; MG/1
1 CAPSULE ORAL EVERY 8 HOURS PRN
Qty: 60 CAPSULE | Refills: 0 | OUTPATIENT
Start: 2023-09-22 | End: 2023-10-17

## 2023-09-22 RX ORDER — BUTALBITAL, ACETAMINOPHEN, CAFFEINE AND CODEINE PHOSPHATE 50; 325; 40; 30 MG/1; MG/1; MG/1; MG/1
1 CAPSULE ORAL EVERY 8 HOURS PRN
Qty: 45 CAPSULE | Refills: 0 | Status: SHIPPED | OUTPATIENT
Start: 2023-09-22 | End: 2023-10-22

## 2023-09-22 NOTE — TELEPHONE ENCOUNTER
Received call/My Chart Message from patient requesting refill on medication(s). Pt was last seen in office on 8/18/2023  and has a follow up scheduled for Visit date not found. Will send request to provider for authorization. Requested Prescriptions     Pending Prescriptions Disp Refills    butalbital-acetaminophen-caffeine-codeine (FIORICET WITH CODEINE) -83-30 MG per capsule 60 capsule 0     Sig: Take 1 capsule by mouth every 8 hours as needed for Headaches for up to 25 days.  Max Daily Amount: 3 capsules

## 2023-09-22 NOTE — TELEPHONE ENCOUNTER
From: Anoop Nunez  To: Nini Vu  Sent: 9/21/2023 9:15 PM CDT  Subject: Hermelindo Whittaker     I need my Butabal-acetaminophen refilled please. [facility] pharmacy.

## 2023-10-02 DIAGNOSIS — G44.039 EPISODIC PAROXYSMAL HEMICRANIA, NOT INTRACTABLE: ICD-10-CM

## 2023-10-02 DIAGNOSIS — M54.12 RADICULITIS, CERVICAL: ICD-10-CM

## 2023-10-02 DIAGNOSIS — G43.109 MIGRAINE WITH AURA AND WITHOUT STATUS MIGRAINOSUS, NOT INTRACTABLE: ICD-10-CM

## 2023-10-02 DIAGNOSIS — M79.18 CERVICAL MYOFASCIAL PAIN SYNDROME: ICD-10-CM

## 2023-10-02 DIAGNOSIS — M54.2 NECK PAIN: ICD-10-CM

## 2023-10-02 RX ORDER — UBROGEPANT 100 MG/1
1 TABLET ORAL DAILY PRN
Qty: 16 TABLET | Refills: 11 | Status: SHIPPED | OUTPATIENT
Start: 2023-10-02

## 2023-10-02 NOTE — TELEPHONE ENCOUNTER
Received fax from pharmacy requesting refill on pts medication(s). Pt was last seen in office on 8/18/2023  and has a follow up scheduled for 10/2/2023. Will send request to  Carol Wheeler  for patient.      Requested Prescriptions     Pending Prescriptions Disp Refills    Ubrogepant (UBRELVY) 100 MG TABS 16 tablet 11     Sig: Take 1 tablet by mouth daily as needed (migraine)

## 2023-10-03 DIAGNOSIS — M54.12 RADICULITIS, CERVICAL: ICD-10-CM

## 2023-10-03 DIAGNOSIS — M54.2 NECK PAIN: ICD-10-CM

## 2023-10-03 DIAGNOSIS — M79.18 CERVICAL MYOFASCIAL PAIN SYNDROME: ICD-10-CM

## 2023-10-03 DIAGNOSIS — G43.109 MIGRAINE WITH AURA AND WITHOUT STATUS MIGRAINOSUS, NOT INTRACTABLE: ICD-10-CM

## 2023-10-03 DIAGNOSIS — G44.039 EPISODIC PAROXYSMAL HEMICRANIA, NOT INTRACTABLE: ICD-10-CM

## 2023-10-03 RX ORDER — BUTALBITAL, ACETAMINOPHEN, CAFFEINE AND CODEINE PHOSPHATE 50; 325; 40; 30 MG/1; MG/1; MG/1; MG/1
1 CAPSULE ORAL EVERY 8 HOURS PRN
Qty: 45 CAPSULE | Refills: 0 | OUTPATIENT
Start: 2023-10-03 | End: 2023-11-02

## 2023-10-03 RX ORDER — UBROGEPANT 100 MG/1
1 TABLET ORAL DAILY PRN
Qty: 16 TABLET | Refills: 11 | OUTPATIENT
Start: 2023-10-03

## 2023-10-23 ENCOUNTER — TELEPHONE (OUTPATIENT)
Dept: FAMILY MEDICINE CLINIC | Age: 23
End: 2023-10-23

## 2023-10-23 ENCOUNTER — PATIENT ROUNDING (BHMG ONLY) (OUTPATIENT)
Dept: INTERNAL MEDICINE | Facility: CLINIC | Age: 23
End: 2023-10-23

## 2023-10-23 ENCOUNTER — OFFICE VISIT (OUTPATIENT)
Dept: INTERNAL MEDICINE | Facility: CLINIC | Age: 23
End: 2023-10-23
Payer: COMMERCIAL

## 2023-10-23 VITALS
RESPIRATION RATE: 18 BRPM | DIASTOLIC BLOOD PRESSURE: 78 MMHG | HEIGHT: 64 IN | SYSTOLIC BLOOD PRESSURE: 129 MMHG | OXYGEN SATURATION: 98 % | BODY MASS INDEX: 29.02 KG/M2 | WEIGHT: 170 LBS | TEMPERATURE: 98.3 F | HEART RATE: 95 BPM

## 2023-10-23 DIAGNOSIS — F41.1 GENERALIZED ANXIETY DISORDER: ICD-10-CM

## 2023-10-23 DIAGNOSIS — N92.6 IRREGULAR PERIODS: ICD-10-CM

## 2023-10-23 DIAGNOSIS — M79.18 CERVICAL MYOFASCIAL PAIN SYNDROME: ICD-10-CM

## 2023-10-23 DIAGNOSIS — G43.E09 CHRONIC MIGRAINE WITH AURA WITHOUT STATUS MIGRAINOSUS, NOT INTRACTABLE: Primary | ICD-10-CM

## 2023-10-23 DIAGNOSIS — E66.3 OVERWEIGHT (BMI 25.0-29.9): ICD-10-CM

## 2023-10-23 RX ORDER — ONDANSETRON 4 MG/1
4 TABLET, ORALLY DISINTEGRATING ORAL EVERY 8 HOURS PRN
COMMUNITY

## 2023-10-23 RX ORDER — BUTALBITAL, ACETAMINOPHEN, CAFFEINE AND CODEINE PHOSPHATE 300; 50; 40; 30 MG/1; MG/1; MG/1; MG/1
1 CAPSULE ORAL 3 TIMES DAILY
COMMUNITY
Start: 2023-10-19

## 2023-10-23 RX ORDER — NORGESTIMATE AND ETHINYL ESTRADIOL 7DAYSX3 LO
1 KIT ORAL DAILY
Qty: 3 PACKET | Refills: 3 | Status: SHIPPED | OUTPATIENT
Start: 2023-10-23

## 2023-10-23 RX ORDER — AMOXICILLIN AND CLAVULANATE POTASSIUM 875; 125 MG/1; MG/1
1 TABLET, FILM COATED ORAL 2 TIMES DAILY
COMMUNITY
Start: 2023-10-21

## 2023-10-23 RX ORDER — AMITRIPTYLINE HYDROCHLORIDE 75 MG/1
75 TABLET ORAL
COMMUNITY

## 2023-10-23 RX ORDER — SERTRALINE HYDROCHLORIDE 100 MG/1
100 TABLET, FILM COATED ORAL DAILY
Qty: 30 TABLET | Refills: 2 | Status: SHIPPED | OUTPATIENT
Start: 2023-10-23

## 2023-10-23 RX ORDER — TIZANIDINE 4 MG/1
2-4 TABLET ORAL EVERY 8 HOURS PRN
COMMUNITY

## 2023-10-23 RX ORDER — VENLAFAXINE HYDROCHLORIDE 75 MG/1
75 CAPSULE, EXTENDED RELEASE ORAL DAILY
COMMUNITY
Start: 2023-05-08

## 2023-10-23 RX ORDER — SERTRALINE HYDROCHLORIDE 100 MG/1
100 TABLET, FILM COATED ORAL DAILY
COMMUNITY
End: 2023-10-23 | Stop reason: SDUPTHER

## 2023-10-23 RX ORDER — NORGESTIMATE AND ETHINYL ESTRADIOL 7DAYSX3 LO
1 KIT ORAL DAILY
COMMUNITY
Start: 2023-10-23

## 2023-10-23 NOTE — TELEPHONE ENCOUNTER
----- Message from Nolberto Mercer sent at 10/23/2023 10:19 AM CDT -----  Subject: Message to Provider    QUESTIONS  Information for Provider? Patient is wanting to switch PCP to Dr. Madi Simmons   instead of Dr. Kamari Chamberlain patient is wanting to schedule with Dr. Madi Simmons from now   on patient is just needing a check up. Please call patient to schedule if   they approve of the switching of providers. ---------------------------------------------------------------------------  --------------  Thuan Vu JEFQ  [phone]; OK to leave message on voicemail  ---------------------------------------------------------------------------  --------------  SCRIPT ANSWERS  Relationship to Patient?  Self
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
## 2023-10-23 NOTE — TELEPHONE ENCOUNTER
Received fax from pharmacy requesting refill on pts medication(s). Pt was last seen in office on 8/18/2023  and has a follow up scheduled for Visit date not found. Will send request to  Elton Martinez  for authorization.      Requested Prescriptions     Pending Prescriptions Disp Refills    Norgestim-Eth Estrad Triphasic (TRI-LO-DYLLAN) 0.18/0.215/0.25 MG-25 MCG TABS 3 packet 3     Sig: Take 1 tablet by mouth daily

## 2023-10-23 NOTE — PROGRESS NOTES
October 23, 2023    Hello, may I speak with Sisi Gale?    My name is JOCELYN      I am  with [facility] [place]  [facility]  [address] [place] [address] [phone].    Before we get started may I verify your date of birth? 2000    I am calling to officially welcome you to our practice and ask about your recent visit. Is this a good time to talk? yes    Tell me about your visit with us. What things went well?  pt stated she really liked our clinic and Dex was so nice, friendly and easy to talk to.       We're always looking for ways to make our patients' experiences even better. Do you have recommendations on ways we may improve?  no    Overall were you satisfied with your first visit to our practice? yes       I appreciate you taking the time to speak with me today. Is there anything else I can do for you? no      Thank you, and have a great day.

## 2023-10-23 NOTE — TELEPHONE ENCOUNTER
Left patient a message to call office back to discuss. She has been dismissed from [facility] and will not be able to schedule appointment with Dr. Brad Nair.

## 2023-10-26 ENCOUNTER — TELEPHONE (OUTPATIENT)
Dept: FAMILY MEDICINE CLINIC | Facility: CLINIC | Age: 23
End: 2023-10-26
Payer: COMMERCIAL

## 2023-10-26 NOTE — TELEPHONE ENCOUNTER
Called patient and discussed there being a savings card for Emgality on the [employer] website. She voiced interest in looking this up. Will send the web address in a my chart message to her.    [email or web address]      ----- Message from Brenda Del Rio RN sent at 10/25/2023  8:25 AM CDT -----  Regarding: Emgality   Contact: [phone]  See patient message       ----- Message -----  From: Sisi Gale  Sent: 10/25/2023   8:22 AM CDT  To: Tolu Ross [facility]  Subject: Emgality                                         My emgality came in, but it’s $350. I cannot afford that right now. I looked for a discount card and it is $680 with that. I will be needing my butalb soon if you don’t care to send in a prescription for that.     Thank you so much.

## 2023-11-01 ENCOUNTER — TELEPHONE (OUTPATIENT)
Dept: INTERNAL MEDICINE | Facility: CLINIC | Age: 23
End: 2023-11-01

## 2023-11-01 NOTE — TELEPHONE ENCOUNTER
Caller: GaleSisi lawson    Relationship: Self    Best call back number: [phone]     What is the best time to reach you: ANYTIME    Who are you requesting to speak with (clinical staff, provider,  specific staff member): CLINICAL      What was the call regarding: THE SHOTS FOR HER MIGRAINES ARE NOT HELPING AT ALL. COULD PRESCRIPTION BE SENT IN   Butalbital-APAP-Caff-Cod -50-30 MG capsule  1 capsule, 3 times daily                         Is it okay if the provider responds through MyChart: YES    [id number] - [place], [place] - [address] [phone] [facility] [phone] FX

## 2023-11-02 DIAGNOSIS — G43.E09 CHRONIC MIGRAINE WITH AURA WITHOUT STATUS MIGRAINOSUS, NOT INTRACTABLE: Primary | ICD-10-CM

## 2023-11-07 ENCOUNTER — LAB (OUTPATIENT)
Dept: INTERNAL MEDICINE | Facility: CLINIC | Age: 23
End: 2023-11-07
Payer: COMMERCIAL

## 2023-11-07 DIAGNOSIS — Z79.899 LONG TERM USE OF DRUG: Primary | ICD-10-CM

## 2023-11-07 DIAGNOSIS — Z79.899 LONG TERM USE OF DRUG: ICD-10-CM

## 2023-11-08 ENCOUNTER — TELEPHONE (OUTPATIENT)
Dept: INTERNAL MEDICINE | Facility: CLINIC | Age: 23
End: 2023-11-08
Payer: COMMERCIAL

## 2023-11-08 DIAGNOSIS — G43.E09 CHRONIC MIGRAINE WITH AURA WITHOUT STATUS MIGRAINOSUS, NOT INTRACTABLE: ICD-10-CM

## 2023-11-08 RX ORDER — ELETRIPTAN HYDROBROMIDE 20 MG/1
20 TABLET, FILM COATED ORAL DAILY PRN
Qty: 20 TABLET | Refills: 1 | Status: SHIPPED | OUTPATIENT
Start: 2023-11-08

## 2023-11-08 NOTE — TELEPHONE ENCOUNTER
Patient called to discuss headache/migraine. Patient reports they had increased to every other day before progressing to daily. She has run out of Fioricet with codeine and reports the ubrelvy is not showing as being sent to the pharmacy. She has been taking ibuprofen with no relief. Pain is described as being behind her right eye with seeing black spots, nausea, and her right arm feeling numb/tingling. Does remember having MRI in the past with the migraines becoming more frequent since that time. Will resend ubrelvy and discussed Relpax that patient had requested. Patient requesting refill of Fioricet, will refer to Dr. Paige to refill this pending urine drug screen results.

## 2023-11-08 NOTE — TELEPHONE ENCOUNTER
Caller: Sisi Gale EMMA    Relationship: Self    Best call back number: [phone]     What medication are you requesting: SOMETHING FOR MIGRAINES    What are your current symptoms: MIGRAINES     Have you had these symptoms before:    [x] Yes  [] No    Have you been treated for these symptoms before:   [x] Yes  [] No    If a prescription is needed, what is your preferred pharmacy and phone number: [facility] #[id number] - [place] - [address] [phone] [facility] [phone] FX     Additional notes: PATIENT STATES SHE HAS A MIGRAINE EVERY OTHER DAY. SHE HAS TO WORK TODAY UNTIL 8 PM AND SHE STATES HER HEAD IS KILLING HER. SHE IS WONDERING IF SOMETHING CAN BE CALLED IN TO HELP WITH THIS. PLEASE CALL BACK WHEN SOMETHING IS CALLED IN.

## 2023-11-15 ENCOUNTER — PRIOR AUTHORIZATION (OUTPATIENT)
Dept: INTERNAL MEDICINE | Facility: CLINIC | Age: 23
End: 2023-11-15
Payer: COMMERCIAL

## 2023-11-15 DIAGNOSIS — F41.1 GENERALIZED ANXIETY DISORDER: ICD-10-CM

## 2023-11-15 RX ORDER — SERTRALINE HYDROCHLORIDE 100 MG/1
100 TABLET, FILM COATED ORAL DAILY
Qty: 90 TABLET | Refills: 0 | Status: SHIPPED | OUTPATIENT
Start: 2023-11-15 | End: 2024-02-13

## 2023-11-17 ENCOUNTER — HOSPITAL ENCOUNTER (EMERGENCY)
Facility: HOSPITAL | Age: 23
Discharge: HOME | End: 2023-11-17
Payer: COMMERCIAL

## 2023-11-17 ENCOUNTER — APPOINTMENT (OUTPATIENT)
Dept: ULTRASOUND IMAGING | Facility: HOSPITAL | Age: 23
End: 2023-11-17
Payer: COMMERCIAL

## 2023-11-17 ENCOUNTER — APPOINTMENT (OUTPATIENT)
Dept: CT IMAGING | Facility: HOSPITAL | Age: 23
End: 2023-11-17
Payer: COMMERCIAL

## 2023-11-17 VITALS
SYSTOLIC BLOOD PRESSURE: 129 MMHG | HEIGHT: 64 IN | DIASTOLIC BLOOD PRESSURE: 74 MMHG | WEIGHT: 171 LBS | HEART RATE: 104 BPM | RESPIRATION RATE: 16 BRPM | BODY MASS INDEX: 29.19 KG/M2 | TEMPERATURE: 98.4 F | OXYGEN SATURATION: 99 %

## 2023-11-17 DIAGNOSIS — N83.201 CYST OF RIGHT OVARY: Primary | ICD-10-CM

## 2023-11-17 DIAGNOSIS — N83.209 HEMORRHAGIC OVARIAN CYST: ICD-10-CM

## 2023-11-17 LAB
ALBUMIN SERPL-MCNC: 4.2 G/DL (ref 3.5–5.2)
ALBUMIN/GLOB SERPL: 1.5 G/DL
ALP SERPL-CCNC: 103 U/L (ref 39–117)
ALT SERPL W P-5'-P-CCNC: 15 U/L (ref 1–33)
AMPHETAMINES UR QL SCN: NEGATIVE NG/ML
ANION GAP SERPL CALCULATED.3IONS-SCNC: 10 MMOL/L (ref 5–15)
AST SERPL-CCNC: 18 U/L (ref 1–32)
B-HCG UR QL: NEGATIVE
BARBITURATES UR QL: POSITIVE
BASOPHILS # BLD AUTO: 0.01 10*3/MM3 (ref 0–0.2)
BASOPHILS NFR BLD AUTO: 0.1 % (ref 0–1.5)
BENZODIAZ UR QL SCN: NEGATIVE NG/ML
BILIRUB SERPL-MCNC: <0.2 MG/DL (ref 0–1.2)
BILIRUB UR QL STRIP: NEGATIVE
BUN SERPL-MCNC: 10 MG/DL (ref 6–20)
BUN/CREAT SERPL: 15.9 (ref 7–25)
BZE UR QL SCN: NEGATIVE NG/ML
CALCIUM SPEC-SCNC: 9 MG/DL (ref 8.6–10.5)
CANNABINOIDS UR QL SCN: NEGATIVE NG/ML
CHLORIDE SERPL-SCNC: 103 MMOL/L (ref 98–107)
CLARITY UR: CLEAR
CO2 SERPL-SCNC: 25 MMOL/L (ref 22–29)
COLOR UR: YELLOW
CREAT SERPL-MCNC: 0.63 MG/DL (ref 0.57–1)
CREAT UR-MCNC: 135.2 MG/DL (ref 20–300)
DEPRECATED RDW RBC AUTO: 44.9 FL (ref 37–54)
EGFRCR SERPLBLD CKD-EPI 2021: 127.2 ML/MIN/1.73
EOSINOPHIL # BLD AUTO: 0 10*3/MM3 (ref 0–0.4)
EOSINOPHIL NFR BLD AUTO: 0 % (ref 0.3–6.2)
ERYTHROCYTE [DISTWIDTH] IN BLOOD BY AUTOMATED COUNT: 13.6 % (ref 12.3–15.4)
EXPIRATION DATE: NORMAL
FENTANYL UR-MCNC: NEGATIVE PG/ML
GLOBULIN UR ELPH-MCNC: 2.8 GM/DL
GLUCOSE SERPL-MCNC: 94 MG/DL (ref 65–99)
GLUCOSE UR STRIP-MCNC: NEGATIVE MG/DL
HCT VFR BLD AUTO: 38.4 % (ref 34–46.6)
HGB BLD-MCNC: 12.1 G/DL (ref 12–15.9)
HGB UR QL STRIP.AUTO: NEGATIVE
IMM GRANULOCYTES # BLD AUTO: 0.02 10*3/MM3 (ref 0–0.05)
IMM GRANULOCYTES NFR BLD AUTO: 0.2 % (ref 0–0.5)
INTERNAL NEGATIVE CONTROL: NORMAL
INTERNAL POSITIVE CONTROL: NORMAL
KETONES UR QL STRIP: NEGATIVE
LABORATORY COMMENT REPORT: ABNORMAL
LEUKOCYTE ESTERASE UR QL STRIP.AUTO: NEGATIVE
LYMPHOCYTES # BLD AUTO: 3.79 10*3/MM3 (ref 0.7–3.1)
LYMPHOCYTES NFR BLD AUTO: 46 % (ref 19.6–45.3)
Lab: NORMAL
MCH RBC QN AUTO: 28.3 PG (ref 26.6–33)
MCHC RBC AUTO-ENTMCNC: 31.5 G/DL (ref 31.5–35.7)
MCV RBC AUTO: 89.7 FL (ref 79–97)
MEPERIDINE UR QL: NEGATIVE NG/ML
METHADONE UR QL SCN: NEGATIVE NG/ML
MONOCYTES # BLD AUTO: 0.52 10*3/MM3 (ref 0.1–0.9)
MONOCYTES NFR BLD AUTO: 6.3 % (ref 5–12)
NEUTROPHILS NFR BLD AUTO: 3.9 10*3/MM3 (ref 1.7–7)
NEUTROPHILS NFR BLD AUTO: 47.4 % (ref 42.7–76)
NITRITE UR QL STRIP: NEGATIVE
NRBC BLD AUTO-RTO: 0 /100 WBC (ref 0–0.2)
OPIATES UR QL SCN: POSITIVE NG/ML
OXYCODONE+OXYMORPHONE UR QL SCN: NEGATIVE NG/ML
PCP UR QL: NEGATIVE NG/ML
PH UR STRIP.AUTO: 6 [PH] (ref 5–8)
PH UR: 5.8 [PH] (ref 4.5–8.9)
PLATELET # BLD AUTO: 297 10*3/MM3 (ref 140–450)
PMV BLD AUTO: 11.3 FL (ref 6–12)
POTASSIUM SERPL-SCNC: 3.8 MMOL/L (ref 3.5–5.2)
PROPOXYPH UR QL SCN: NEGATIVE NG/ML
PROT SERPL-MCNC: 7 G/DL (ref 6–8.5)
PROT UR QL STRIP: NEGATIVE
RBC # BLD AUTO: 4.28 10*6/MM3 (ref 3.77–5.28)
SODIUM SERPL-SCNC: 138 MMOL/L (ref 136–145)
SP GR UR STRIP: <=1.005 (ref 1–1.03)
SP GR UR: 1.03
TRAMADOL UR QL SCN: NEGATIVE NG/ML
UROBILINOGEN UR QL STRIP: NORMAL
WBC NRBC COR # BLD AUTO: 8.24 10*3/MM3 (ref 3.4–10.8)

## 2023-11-17 PROCEDURE — 85025 COMPLETE CBC W/AUTO DIFF WBC: CPT

## 2023-11-17 PROCEDURE — 99284 EMERGENCY DEPT VISIT MOD MDM: CPT

## 2023-11-17 PROCEDURE — 96375 TX/PRO/DX INJ NEW DRUG ADDON: CPT

## 2023-11-17 PROCEDURE — 81003 URINALYSIS AUTO W/O SCOPE: CPT

## 2023-11-17 PROCEDURE — 25010000002 ONDANSETRON PER 1 MG

## 2023-11-17 PROCEDURE — 25010000002 MORPHINE PER 10 MG: Performed by: EMERGENCY MEDICINE

## 2023-11-17 PROCEDURE — 96376 TX/PRO/DX INJ SAME DRUG ADON: CPT

## 2023-11-17 PROCEDURE — 76830 TRANSVAGINAL US NON-OB: CPT

## 2023-11-17 PROCEDURE — 80053 COMPREHEN METABOLIC PANEL: CPT

## 2023-11-17 PROCEDURE — 81025 URINE PREGNANCY TEST: CPT

## 2023-11-17 PROCEDURE — 96374 THER/PROPH/DIAG INJ IV PUSH: CPT

## 2023-11-17 PROCEDURE — 25010000002 ONDANSETRON PER 1 MG: Performed by: EMERGENCY MEDICINE

## 2023-11-17 PROCEDURE — 93975 VASCULAR STUDY: CPT

## 2023-11-17 RX ORDER — ONDANSETRON 2 MG/ML
4 INJECTION INTRAMUSCULAR; INTRAVENOUS ONCE
Status: COMPLETED | OUTPATIENT
Start: 2023-11-17 | End: 2023-11-17

## 2023-11-17 RX ORDER — SODIUM CHLORIDE 0.9 % (FLUSH) 0.9 %
10 SYRINGE (ML) INJECTION AS NEEDED
Status: DISCONTINUED | OUTPATIENT
Start: 2023-11-17 | End: 2023-11-17 | Stop reason: HOSPADM

## 2023-11-17 RX ORDER — OXYCODONE HYDROCHLORIDE AND ACETAMINOPHEN 5; 325 MG/1; MG/1
1 TABLET ORAL EVERY 6 HOURS PRN
Qty: 12 TABLET | Refills: 0 | Status: SHIPPED | OUTPATIENT
Start: 2023-11-17

## 2023-11-17 RX ORDER — ONDANSETRON 4 MG/1
4 TABLET, ORALLY DISINTEGRATING ORAL EVERY 6 HOURS PRN
Qty: 30 TABLET | Refills: 0 | Status: SHIPPED | OUTPATIENT
Start: 2023-11-17

## 2023-11-17 RX ADMIN — MORPHINE SULFATE 4 MG: 4 INJECTION, SOLUTION INTRAMUSCULAR; INTRAVENOUS at 19:59

## 2023-11-17 RX ADMIN — ONDANSETRON 4 MG: 2 INJECTION INTRAMUSCULAR; INTRAVENOUS at 19:59

## 2023-11-17 RX ADMIN — ONDANSETRON 4 MG: 2 INJECTION INTRAMUSCULAR; INTRAVENOUS at 17:28

## 2023-11-17 NOTE — Clinical Note
[facility] EMERGENCY DEPARTMENT  [address]  [facility] [address]  Phone: [phone]    Sisi Gale was seen and treated in our emergency department on 11/17/2023.  She may return to work on 11/21/2023.         Thank you for choosing [facility].    Dom Henderson, APRN

## 2023-11-17 NOTE — ED PROVIDER NOTES
Subjective   History of Present Illness  Patient is a 22-year-old female that presents to the emergency department for right lower quadrant abdominal pain and pelvic pain that has been ongoing for the last few days but worse in the last 24 hours.  She is also reporting abnormal vaginal bleeding that has been ongoing for the last 3 weeks. She denies any large amount of bleeding or clots.  She does report 1 of those weeks with her menstrual cycle.  Patient reports she had an ultrasound of her ovaries approximately 6 to 7 weeks ago that did reveal an ovarian cyst.  She states that she does have issues with recurrent ovarian cyst.  Patient reports she has followed up with her OB/GYN, Dr. Quevedo regarding abnormal vaginal bleeding and recurrent ovarian cyst and reports they recently changed her birth control medication.  She states since then she has not experienced any vaginal bleeding but has had continued right lower quadrant and right-sided pelvic pain.  Patient denies any abnormal vaginal bleeding or discharge at this time.  She denies any radiation of pain.  She denies any other symptoms at this time such as chest pain, shortness of breath, nausea, vomiting, constipation or diarrhea.  Denies any fevers, cough or congestion.      Review of Systems   Gastrointestinal:  Positive for abdominal pain.        RLQ abdominal pain   Genitourinary:  Positive for frequency and pelvic pain.   All other systems reviewed and are negative.      History reviewed. No pertinent past medical history.    Allergies   Allergen Reactions    Bactrim [Sulfamethoxazole-Trimethoprim] Hives    Biaxin [Clarithromycin] Hives    Cephalosporins Hives    Sulfa Antibiotics Hives       Past Surgical History:   Procedure Laterality Date    WISDOM TOOTH EXTRACTION         Family History   Problem Relation Age of Onset    No Known Problems Father     No Known Problems Mother        Social History     Socioeconomic History    Marital status: Single    Tobacco Use    Smoking status: Never    Smokeless tobacco: Never   Substance and Sexual Activity    Alcohol use: No           Objective   Physical Exam  Vitals and nursing note reviewed.   Constitutional:       Appearance: Normal appearance.      Comments: Nontoxic appearing. In no acute distress.    HENT:      Head: Normocephalic and atraumatic.      Right Ear: External ear normal.      Left Ear: External ear normal.      Nose: Nose normal.      Mouth/Throat:      Mouth: Mucous membranes are moist.      Pharynx: Oropharynx is clear.   Eyes:      Extraocular Movements: Extraocular movements intact.      Conjunctiva/sclera: Conjunctivae normal.      Pupils: Pupils are equal, round, and reactive to light.   Cardiovascular:      Rate and Rhythm: Normal rate and regular rhythm.      Pulses: Normal pulses.      Heart sounds: Normal heart sounds.   Pulmonary:      Effort: Pulmonary effort is normal. No respiratory distress.      Breath sounds: Normal breath sounds. No wheezing.   Chest:      Chest wall: No tenderness.   Abdominal:      General: Abdomen is flat. Bowel sounds are normal.      Palpations: Abdomen is soft.      Tenderness: There is abdominal tenderness in the right lower quadrant and suprapubic area. There is no right CVA tenderness, left CVA tenderness, guarding or rebound.   Musculoskeletal:         General: Normal range of motion.      Cervical back: Normal range of motion and neck supple.      Right lower leg: No edema.      Left lower leg: No edema.   Skin:     General: Skin is warm and dry.      Capillary Refill: Capillary refill takes less than 2 seconds.   Neurological:      General: No focal deficit present.      Mental Status: She is alert and oriented to person, place, and time. Mental status is at baseline.   Psychiatric:         Mood and Affect: Mood normal.         Behavior: Behavior normal.         Thought Content: Thought content normal.         Judgment: Judgment normal.       Labs Reviewed    CBC WITH AUTO DIFFERENTIAL - Abnormal; Notable for the following components:       Result Value    Lymphocyte % 46.0 (*)     Eosinophil % 0.0 (*)     Lymphocytes, Absolute 3.79 (*)     All other components within normal limits   URINALYSIS W/ CULTURE IF INDICATED - Normal    Narrative:     In absence of clinical symptoms, the presence of pyuria, bacteria, and/or nitrites on the urinalysis result does not correlate with infection.  Urine microscopic not indicated.   POCT PEFORM URINE PREGNANCY - Normal   COMPREHENSIVE METABOLIC PANEL    Narrative:     GFR Normal >60  Chronic Kidney Disease <60  Kidney Failure <15     CBC AND DIFFERENTIAL    Narrative:     The following orders were created for panel order CBC & Differential.  Procedure                               Abnormality         Status                     ---------                               -----------         ------                     CBC Auto Differential[[id number]]        Abnormal            Final result                 Please view results for these tests on the individual orders.      US Non-ob Transvaginal   Final Result   1. Complex lesion within the right ovary measuring 3.8 x 3.8 x 3.8 cm   containing internal echoes. Complex cyst is in the differential. Ovarian   dermoid tumor is also considered. Other solid neoplasm is less likely.   There is no evidence of ovarian torsion.   2. Septated cystic lesion within the left ovary. Likely a complex   hemorrhagic cyst. This measures up to 2.6 cm. Follow-up recommended.   3. An echogenic masslike area in the anterior fundus may represent a   leiomyoma or lipoleiomyoma.           This report was signed and finalized on 11/17/2023 7:01 PM CST by Dr. Tavares Delarosa MD.          US Testicular or Ovarian Vascular Complete    (Results Pending)        Procedures           ED Course  ED Course as of 11/18/23 1106   Fri Nov 17, 2023 1948 Dr. Quevedo paged at this time regarding US findings.  [KF]      ED  Course User Index  [KF] Dom Henderson, SNE                                           Medical Decision Making  Sisi Gale is a 22 y.o. female who presents to the ED for right lower quadrant abdominal pain and pelvic pain that has been ongoing for the last few days but worse in the last 24 hours.  She is also reporting abnormal vaginal bleeding that has been ongoing for the last 3 weeks. She denies any large amount of bleeding or clots.  She does report 1 of those weeks with her menstrual cycle.  Patient reports she had an ultrasound of her ovaries approximately 6 to 7 weeks ago that did reveal an ovarian cyst.  She states that she does have issues with recurrent ovarian cyst.  Patient reports she has followed up with her OB/GYN, Dr. Quevedo regarding abnormal vaginal bleeding and recurrent ovarian cyst and reports they recently changed her birth control medication.  She states since then she has not experienced any vaginal bleeding but has had continued right lower quadrant and right-sided pelvic pain.  Patient denies any abnormal vaginal bleeding or discharge at this time.  She denies any radiation of pain.  She denies any other symptoms at this time such as chest pain, shortness of breath, nausea, vomiting, constipation or diarrhea.  Denies any fevers, cough or congestion.    Patient was non-toxic appearing on arrival. No acute distress was noted.  Vital signs stable.     Past medical history, surgical history, and medication regimen reviewed.     Previous notes, labs, imaging and more reviewed.    Patient's presentation raises suspicion for differentials including, but not limited to, ovarian cyst, ovarian torsion, appendicitis, cholecystitis.     Please refer to above section of note for lab and imaging results that were reviewed and interpreted by radiology as well as attending physician.     Medications administered,   ondansetron (ZOFRAN) injection 4 mg (4 mg Intravenous Given 11/17/23  1728)  morphine injection 4 mg (4 mg Intravenous Given 11/17/23 1959)  ondansetron (ZOFRAN) injection 4 mg (4 mg Intravenous Given 11/17/23 1959)     Patient was able to tolerate PO challenge without difficulty. No evidence of nausea or vomiting noted.     Spoke with PRICE Webb on call regarding transvaginal ultrasound results. He states that patient can go home with pain medication and he will see her in his office Monday morning, 11/20/23 at 8:30AM.     Given findings described above, patient's presentation is likely consistent with left-sided hemorrhagic ovarian cyst, and complex right ovarian cyst. I have a low suspicion for torsion at this point in their ED course.      I had an in-depth discussion with the patient as well as mother present at bedside regarding all lab and imaging results completed during this ED encounter today.  We discussed that patient will need to follow-up with OGYN on Monday, 11/20/23 at 830AM to discuss further treatment of these complex ovarian cysts.  Patient was informed she was prescribed pain medication as well as Zofran for nausea. I answered all the questions regarding the emergency department evaluation, diagnosis, and treatment plan in plain and simple language that was understandable. We discussed that due to always having some diagnostic uncertainty while in the ER, there is always a chance that symptoms may change or new symptoms may reveal themselves after being discharged. Because of this, I stressed the importance of Sisi following up with their OGYN and PCP. Patient informed that appointment will need to be done by calling their office to set up an appointment within the next few days or as soon as reasonably possible so that the symptoms can be re-evaluated for improvement or for any other questions. I also gave Sisi common sense return precautions and prompted patient to return to the emergency department within 24 - 48hrs if there are any new, worsening,  or concerning symptoms. The patient verbalized understanding of the discharge instructions and agreed with them. Sisi was discharged in stable condition.     Dragon disclaimer:  Parts of this note may be an electronic transcription/translation of spoken language to printed text using the Dragon dictation system.       Problems Addressed:  Cyst of right ovary: complicated acute illness or injury  Hemorrhagic ovarian cyst: complicated acute illness or injury    Amount and/or Complexity of Data Reviewed  Labs: ordered.  Radiology: ordered.    Risk  Prescription drug management.        Final diagnoses:   Cyst of right ovary   Hemorrhagic ovarian cyst       ED Disposition  ED Disposition       ED Disposition   Discharge    Condition   Stable    Comment   --               Dex Del Rio, APRN  [address]  [facility] [id number] [phone]          Jeovany Quevedo MD  [address] [id number]  [phone]    Go on 11/20/2023  at 8:30AM    [facility] EMERG[facility]  [address] [id number] [phone]    If symptoms worsen         Medication List        New Prescriptions      oxyCODONE-acetaminophen 5-325 MG per tablet  Commonly known as: PERCOCET  Take 1 tablet by mouth Every 6 (Six) Hours As Needed for Severe Pain.            Changed      ondansetron ODT 4 MG disintegrating tablet  Commonly known as: ZOFRAN-ODT  Place 1 tablet on the tongue Every 6 (Six) Hours As Needed for Nausea or Vomiting.  What changed:   when to take this  reasons to take this               Where to Get Your Medications        These medications were sent to [facility]/pharmacy #[id number] - [address] - [phone]  - [phone] [address] [address]      Phone: [phone]   ondansetron ODT 4 MG disintegrating tablet  oxyCODONE-acetaminophen 5-325 MG per tablet            Dom Henderson, APRN  11/18/23 8595

## 2023-11-17 NOTE — TELEPHONE ENCOUNTER
Key: [id number] - PA Case ID: [id number] - Rx #: [id number] help? Call us at [phone]    Denied on November 16  Your PA request has been denied. Additional information will be provided in the denial communication. (Message 1140)    Ubrelvy 100MG tablets

## 2023-11-18 NOTE — DISCHARGE INSTRUCTIONS
It was very nice to meet you, Sisi. Thank you for allowing us to take care of you today at [facility].    Today you were seen in the emergency department for your symptoms. Please understand that an ER evaluation is just the start of your evaluation. We do the best we can, but we are often unable to fully find what is causing your symptoms from one evaluation.  Because of this, the goal is to determine whether you need to be evaluated in the hospital or if it is safe for you to go home and see other doctors provided such as primary care physicians or specialist on an outpatient basis.     Like we discussed, I strongly urge that you follow up with your primary care doctor and Dr. Quevedo. Please call their office to set up an appointment as soon as possible so that you can be re-evaluated for improvement in your symptoms or for any other questions.  I have provided the primary care provider information needed, including phone number, to call to set up an appointment below in these discharge papers.     Educational material has also been provided in the following pages regarding what we have discussed today.     MEDICATIONS PRESCRIBED: Percocet for pain and Zofran for nausea    Please return to the emergency room within 12-48 hours if you experience symptoms such as the following:   Fever, chills, chest pain or shortness of breath, pain with inspiration/expiration, pain that travels to your arms, neck or back, nausea, vomiting, severe headache, tearing pain in your chest, dizziness, feel as though you are about to pass out, OR if you have any worsening symptoms, or any other concerns.

## 2023-11-20 ENCOUNTER — OFFICE VISIT (OUTPATIENT)
Dept: INTERNAL MEDICINE | Facility: CLINIC | Age: 23
End: 2023-11-20
Payer: COMMERCIAL

## 2023-11-20 VITALS
WEIGHT: 170 LBS | TEMPERATURE: 97.8 F | HEIGHT: 64 IN | BODY MASS INDEX: 29.02 KG/M2 | DIASTOLIC BLOOD PRESSURE: 78 MMHG | HEART RATE: 108 BPM | SYSTOLIC BLOOD PRESSURE: 118 MMHG | RESPIRATION RATE: 18 BRPM | OXYGEN SATURATION: 97 %

## 2023-11-20 DIAGNOSIS — J02.9 ACUTE PHARYNGITIS, UNSPECIFIED ETIOLOGY: ICD-10-CM

## 2023-11-20 DIAGNOSIS — G43.E09 CHRONIC MIGRAINE WITH AURA WITHOUT STATUS MIGRAINOSUS, NOT INTRACTABLE: Primary | ICD-10-CM

## 2023-11-20 DIAGNOSIS — R93.5 ABNORMAL ULTRASOUND OF OVARY: ICD-10-CM

## 2023-11-20 DIAGNOSIS — F41.1 GENERALIZED ANXIETY DISORDER: ICD-10-CM

## 2023-11-20 DIAGNOSIS — N83.9 LESION OF RIGHT OVARY: ICD-10-CM

## 2023-11-20 DIAGNOSIS — F41.8 SITUATIONAL ANXIETY: ICD-10-CM

## 2023-11-20 PROCEDURE — 99214 OFFICE O/P EST MOD 30 MIN: CPT

## 2023-11-20 RX ORDER — DROSPIRENONE AND ETHINYL ESTRADIOL 0.03MG-3MG
1 KIT ORAL DAILY
COMMUNITY
Start: 2023-11-07

## 2023-11-20 RX ORDER — AZITHROMYCIN 250 MG/1
TABLET, FILM COATED ORAL
Qty: 6 TABLET | Refills: 0 | Status: SHIPPED | OUTPATIENT
Start: 2023-11-20

## 2023-11-20 RX ORDER — GALCANEZUMAB 120 MG/ML
INJECTION, SOLUTION SUBCUTANEOUS
COMMUNITY
Start: 2023-10-24 | End: 2023-11-20

## 2023-11-20 NOTE — PROGRESS NOTES
Answers submitted by the patient for this visit:  Primary Reason for Visit (Submitted on 11/20/2023)  What is the primary reason for your visit?: Neurological Problem  Chief Complaint  Headache (Follow up), Anxiety, and Sore Throat    Subjective        Sisi Gale presents to [facility]  Neurologic Problem  The patient's primary symptoms include clumsiness, focal sensory loss and slurred speech. This is a chronic problem. The current episode started more than 1 year ago. The neurological problem developed suddenly. The problem has been rapidly worsening since onset. There was facial, right-sided and upper extremity focality noted. Associated symptoms include an aura and nausea. Pertinent negatives include no abdominal pain, auditory change, bowel incontinence or vomiting. Past treatments include acetaminophen, bed rest, eating, medication, neck support, position change, sleep and walking. The treatment provided mild relief.     Patient is a 23-year-old female following up today on her headaches, anxiety, sore throat, and with reports of going to the emergency department last Friday.     She was started on Emgality last month and reports taking 2 shots for the loading dose. At first no improvement but does report headaches are down to a 4-5/10 on the pain scale with them previously being 7-8/10. Continues to report having numbness and tingling in her right arm. This occurs about half the day. Has noticed her right arm being weaker than the left when she works out. Has recently seen a chiropractor with her neck being adjusted, this relieved some tension but she experienced muscle pain afterwards. Taking a half a zanaflex at night. Has taken Relpax a few times for migraine with relief. Has scheduled MRI of brain 12/5/2023. Has not taken Fioricet in over a week.     Her anxiety is elevated at this time due to recent findings at the Emergency Department. Feels her anxiety and depression  "is controlled with current medications pending further work up.     2 weeks ago had presented to [facility] for sore throat. Was prescribed amoxicillin and completed with pain returning once stopped. Went back to [facility] and was given another antibiotic that she just finished about 3 days ago. Complains of sore throat starting to return. Describes having previously seen ENT and was scheduled to have her tonsils removed in 2020 but this was canceled due to COVID-19.     Patient had presented to OB 6-7 weeks ago for birth control and abnormal bleeding. She was found to have a ovarian cyst and was unable to get follow up imaging by insurance. Had developed stabbing pain in her right lower abdomen Friday 11/17/23 and presented to [facility] Emergency Department. Ultrasound imaging in the ED found a 3.8 cm complex cyst on the right ovary and a 2.6 cm cyst on the left ovary as well as a mass like area in the anterior fundus. Followed up with Dr. Quevedo this morning with no further plan. Patient reports going to Dr. Joyner tomorrow for a second opinion.       Past Medical History:   Diagnosis Date    Allergic     Anxiety      Past Surgical History:   Procedure Laterality Date    WISDOM TOOTH EXTRACTION       Social History     Socioeconomic History    Marital status: Single   Tobacco Use    Smoking status: Never     Passive exposure: Never    Smokeless tobacco: Never   Substance and Sexual Activity    Alcohol use: No    Drug use: Never    Sexual activity: Not Currently     Partners: Male     Birth control/protection: Condom, Birth control pill     Family History   Problem Relation Age of Onset    No Known Problems Father     No Known Problems Mother        Objective   Vital Signs:  /78 (BP Location: Left arm, Patient Position: Sitting, Cuff Size: Adult)   Pulse 108   Temp 97.8 °F (36.6 °C)   Resp 18   Ht 162.6 cm (64\")   Wt 77.1 kg (170 lb)   SpO2 97%   BMI 29.18 kg/m²   Estimated body mass " "index is 29.18 kg/m² as calculated from the following:    Height as of this encounter: 162.6 cm (64\").    Weight as of this encounter: 77.1 kg (170 lb).          PHQ-9 Depression Screening  Little interest or pleasure in doing things? 0-->not at all   Feeling down, depressed, or hopeless? 0-->not at all   Trouble falling or staying asleep, or sleeping too much?     Feeling tired or having little energy?     Poor appetite or overeating?     Feeling bad about yourself - or that you are a failure or have let yourself or your family down?     Trouble concentrating on things, such as reading the newspaper or watching television?     Moving or speaking so slowly that other people could have noticed? Or the opposite - being so fidgety or restless that you have been moving around a lot more than usual?     Thoughts that you would be better off dead, or of hurting yourself in some way?     PHQ-9 Total Score 0   If you checked off any problems, how difficult have these problems made it for you to do your work, take care of things at home, or get along with other people?        CHIQUIS-7: Over the last two weeks, how often have you been bothered by the following problems?  Feeling nervous, anxious or on edge: Not at all  Not being able to stop or control worrying: Not at all  Worrying too much about different things: Not at all  Trouble Relaxing: Not at all  Being so restless that it is hard to sit still: Not at all  Becoming easily annoyed or irritable: Not at all  Feeling afraid as if something awful might happen: Not at all  CHIQUIS 7 Total Score: 0  If you checked any problems, how difficult have these problems made it for you to do your work, take care of things at home, or get along with other people: Not difficult at all      Physical Exam  Vitals and nursing note reviewed.   Constitutional:       Appearance: Normal appearance.   HENT:      Head: Normocephalic.      Right Ear: A middle ear effusion is present. Tympanic membrane " is bulging.      Left Ear: Tympanic membrane normal.      Nose: Nose normal. Congestion present.      Mouth/Throat:      Mouth: Mucous membranes are moist.      Pharynx: Oropharynx is clear. Posterior oropharyngeal erythema present.   Eyes:      Pupils: Pupils are equal, round, and reactive to light.   Cardiovascular:      Rate and Rhythm: Normal rate and regular rhythm.      Pulses: Normal pulses.      Heart sounds: Normal heart sounds. No murmur heard.  Pulmonary:      Effort: Pulmonary effort is normal. No respiratory distress.   Abdominal:      General: Bowel sounds are normal.      Palpations: Abdomen is soft.   Musculoskeletal:         General: Normal range of motion.      Cervical back: Normal range of motion and neck supple. Tenderness present.   Lymphadenopathy:      Cervical: Cervical adenopathy present.   Skin:     General: Skin is warm and dry.      Capillary Refill: Capillary refill takes less than 2 seconds.   Neurological:      General: No focal deficit present.      Mental Status: She is alert.        Result Review :  The following data was reviewed by: SEN Smith on 11/20/2023:  Data reviewed : Recent hospitalization notes from [facility] Emergency Department on 11/17/2023.           Assessment and Plan   Diagnoses and all orders for this visit:    1. Chronic migraine with aura without status migrainosus, not intractable (Primary)  -     Galcanezumab-gnlm 120 MG/ML solution prefilled syringe; Inject 1 mL under the skin into the appropriate area as directed Every 30 (Thirty) Days.  Dispense: 1.12 mL; Refill: 5    2. Acute pharyngitis, unspecified etiology  -     azithromycin (Zithromax Z-Tommy) 250 MG tablet; Take 2 tablets by mouth on day 1, then 1 tablet daily on days 2-5  Dispense: 6 tablet; Refill: 0    3. Situational anxiety    4. Generalized anxiety disorder    5. Abnormal ultrasound of ovary    6. Lesion of right ovary      - Will continue treatment with Emgality for  migraine as patient is reporting slow improvement in her migraines. Patient encouraged to keep appointment for MRI of brain with change in migraines and other neurological symptoms.   - Will retreat pharyngitis. Patient to reconsider ENT follow up.  - Generalized anxiety has been controlled, but is describes situational anxiety concerning ED imaging findings.   - Patient is to keep upcoming appointment with OBGYN concerning imaging findings.          Follow Up   Return in about 4 weeks (around 12/18/2023) for Recheck.  Patient was given instructions and counseling regarding her condition or for health maintenance advice. Please see specific information pulled into the AVS if appropriate.

## 2023-12-05 ENCOUNTER — HOSPITAL ENCOUNTER (OUTPATIENT)
Dept: MRI IMAGING | Facility: HOSPITAL | Age: 23
Discharge: HOME | End: 2023-12-05
Payer: COMMERCIAL

## 2023-12-05 DIAGNOSIS — G43.E09 CHRONIC MIGRAINE WITH AURA WITHOUT STATUS MIGRAINOSUS, NOT INTRACTABLE: ICD-10-CM

## 2023-12-05 PROCEDURE — 82565 ASSAY OF CREATININE: CPT

## 2023-12-05 PROCEDURE — 70553 MRI BRAIN STEM W/O & W/DYE: CPT

## 2023-12-05 PROCEDURE — 0 GADOBENATE DIMEGLUMINE 529 MG/ML SOLUTION

## 2023-12-05 PROCEDURE — A9577 INJ MULTIHANCE: HCPCS

## 2023-12-05 RX ADMIN — GADOBENATE DIMEGLUMINE 16 ML: 529 INJECTION, SOLUTION INTRAVENOUS at 10:55

## 2023-12-06 LAB — CREAT BLDA-MCNC: 0.7 MG/DL (ref 0.6–1.3)

## 2023-12-08 ENCOUNTER — OFFICE VISIT (OUTPATIENT)
Dept: INTERNAL MEDICINE | Facility: CLINIC | Age: 23
End: 2023-12-08
Payer: COMMERCIAL

## 2023-12-08 VITALS
SYSTOLIC BLOOD PRESSURE: 137 MMHG | WEIGHT: 166 LBS | TEMPERATURE: 98 F | HEIGHT: 64 IN | BODY MASS INDEX: 28.34 KG/M2 | DIASTOLIC BLOOD PRESSURE: 83 MMHG | HEART RATE: 107 BPM | RESPIRATION RATE: 18 BRPM | OXYGEN SATURATION: 97 %

## 2023-12-08 DIAGNOSIS — Z11.59 NEED FOR HEPATITIS C SCREENING TEST: ICD-10-CM

## 2023-12-08 DIAGNOSIS — Z00.00 ROUTINE GENERAL MEDICAL EXAMINATION AT A HEALTH CARE FACILITY: Primary | ICD-10-CM

## 2023-12-08 DIAGNOSIS — F41.1 GENERALIZED ANXIETY DISORDER: ICD-10-CM

## 2023-12-08 PROCEDURE — 99395 PREV VISIT EST AGE 18-39: CPT | Performed by: NURSE PRACTITIONER

## 2023-12-08 RX ORDER — VENLAFAXINE HYDROCHLORIDE 37.5 MG/1
37.5 CAPSULE, EXTENDED RELEASE ORAL DAILY
Qty: 30 CAPSULE | Refills: 0 | Status: SHIPPED | OUTPATIENT
Start: 2023-12-08

## 2023-12-08 RX ORDER — BUTALBITAL, ACETAMINOPHEN, CAFFEINE AND CODEINE PHOSPHATE 50; 325; 40; 30 MG/1; MG/1; MG/1; MG/1
1 CAPSULE ORAL
COMMUNITY
Start: 2023-12-04

## 2023-12-08 NOTE — PROGRESS NOTES
Subjective     Chief Complaint   Patient presents with    Annual Exam       History of Present Illness  Pt comes in today for her annual exam.     The patient was seen in the emergency room on 11/17/2023. She had a CBC, CMP, and urinalysis done. She was not pregnant at that time. She does not have children, but she would like children in the future.    She notes that her blood pressure was elevated.    The patient is drinking more, urinating more, and having normal bowel movements.    The patient is currently taking Relpax as needed, Zoloft 100 mg, amitriptyline at night for sleep and headache prevention, tizanidine 0.25 tablet at night, birth control in the morning, and over-the-counter omeprazole for acid reflux. She states she has not taken the Relpax yet. She takes Zoloft 100 mg, Effexor, birth control, and over-the-counter omeprazole in the morning.    The patient notes that she has anxiety. She states she does not want to get out of bed she does not think her Zoloft is working.     Review of Systems   Otherwise complete ROS reviewed and negative except as mentioned in the HPI.    Past Medical History:   Past Medical History:   Diagnosis Date    Allergic     Anxiety      Past Surgical History:  Past Surgical History:   Procedure Laterality Date    WISDOM TOOTH EXTRACTION       Social History:  reports that she has never smoked. She has never been exposed to tobacco smoke. She has never used smokeless tobacco. She reports that she does not drink alcohol and does not use drugs.    Family History: family history includes No Known Problems in her father and mother.       Allergies:  Allergies   Allergen Reactions    Bactrim [Sulfamethoxazole-Trimethoprim] Hives    Biaxin [Clarithromycin] Hives    Cephalosporins Hives    Sulfa Antibiotics Hives     Medications:  Prior to Admission medications    Medication Sig Start Date End Date Taking? Authorizing Provider   amitriptyline (ELAVIL) 75 MG tablet Take 1  tablet by mouth every night at bedtime.   Yes Provider, Historical, MD   butalbital-acetaminophen-caffeine-codeine (FIORICET WITH CODEINE) -30-30 MG per capsule Take 1 capsule by mouth. 12/4/23  Yes Provider, Historical, MD   Butalbital-APAP-Caff-Cod -11-30 MG capsule Take 1 capsule by mouth 3 times a day. 10/19/23  Yes Mary Sharp MD   eletriptan (Relpax) 20 MG tablet Take 1 tablet by mouth Daily As Needed for Migraine. may repeat in 2 hours if necessary 11/8/23  Yes Dex Del Rio APRN   Galcanezumab-gnlm 120 MG/ML solution prefilled syringe Inject 1 mL under the skin into the appropriate area as directed Every 30 (Thirty) Days. 11/20/23  Yes Dex Del Rio APRN   ondansetron ODT (ZOFRAN-ODT) 4 MG disintegrating tablet Place 1 tablet on the tongue Every 6 (Six) Hours As Needed for Nausea or Vomiting. 11/17/23  Yes Dom Henderson APRN   oxyCODONE-acetaminophen (PERCOCET) 5-325 MG per tablet Take 1 tablet by mouth Every 6 (Six) Hours As Needed for Severe Pain. 11/17/23  Yes Philippe Christianson MD   sertraline (ZOLOFT) 100 MG tablet Take 1 tablet by mouth Daily for 90 days. 11/15/23 2/13/24 Yes Dex Del Rio APRN   Rosy 3-0.03 MG per tablet Take 1 tablet by mouth Daily. 11/7/23  Yes Mary Sharp MD   tamsulosin (FLOMAX) 0.4 MG capsule 24 hr capsule Take 1 capsule by mouth Every Night. 12/19/17  Yes Reynaldo Antoine MD   tiZANidine (ZANAFLEX) 4 MG tablet Take 0.5-1 tablets by mouth Every 8 (Eight) Hours As Needed.   Yes Mary Sharp MD   ubrogepant (Ubrelvy) 100 MG tablet Take 1 tablet by mouth Daily As Needed (migraine). 11/8/23  Yes Dex Del Rio APRN   venlafaxine XR (EFFEXOR-XR) 75 MG 24 hr capsule Take 1 capsule by mouth Daily. 5/8/23  Yes Provider, Mary, MD   amoxicillin-clavulanate (AUGMENTIN) 875-125 MG per tablet Take 1 tablet by mouth 2 (Two) Times a Day. 10/21/23   Provider, MD Mary   azithromycin (Zithromax Z-Tommy) 250 MG tablet  "Take 2 tablets by mouth on day 1, then 1 tablet daily on days 2-5 11/20/23   Dex Del Rio APRN       Objective     Vital Signs: /83   Pulse 107   Temp 98 °F (36.7 °C)   Resp 18   Ht 162.6 cm (64\")   Wt 75.3 kg (166 lb)   [facility] 11/17/2023   SpO2 97%   BMI 28.49 kg/m²   Physical Exam  Vitals reviewed.   Constitutional:       Appearance: Normal appearance. She is well-developed.   HENT:      Head: Normocephalic and atraumatic.      Right Ear: Tympanic membrane normal.      Left Ear: Tympanic membrane normal.      Mouth/Throat:      Mouth: Mucous membranes are moist.   Eyes:      Pupils: Pupils are equal, round, and reactive to light.   Neck:      Vascular: No JVD.   Cardiovascular:      Rate and Rhythm: Normal rate and regular rhythm.      Pulses: Normal pulses.   Pulmonary:      Effort: Pulmonary effort is normal.      Breath sounds: Normal breath sounds.   Abdominal:      General: Bowel sounds are normal.      Palpations: Abdomen is soft.   Musculoskeletal:         General: No deformity. Normal range of motion.      Cervical back: Normal range of motion and neck supple.   Lymphadenopathy:      Cervical: No cervical adenopathy.   Skin:     General: Skin is warm and dry.   Neurological:      General: No focal deficit present.      Mental Status: She is alert and oriented to person, place, and time.   Psychiatric:         Mood and Affect: Mood normal.         Behavior: Behavior normal.         Thought Content: Thought content normal.         Judgment: Judgment normal.       Results Reviewed:  Glucose   Date Value Ref Range Status   11/17/2023 94 65 - 99 mg/dL Final   05/06/2022 83 74 - 109 mg/dL Final     BUN   Date Value Ref Range Status   11/17/2023 10 6 - 20 mg/dL Final   05/06/2022 9 6 - 20 mg/dL Final     Creatinine   Date Value Ref Range Status   12/05/2023 0.70 0.60 - 1.30 mg/dL Final     Comment:     Serial Number: [id number]:  [id number]   05/06/2022 0.6 0.5 - 0.9 mg/dL Final     Sodium   Date " Value Ref Range Status   11/17/2023 138 136 - 145 mmol/L Final   05/06/2022 142 136 - 145 mmol/L Final     Potassium   Date Value Ref Range Status   11/17/2023 3.8 3.5 - 5.2 mmol/L Final     Comment:     Slight hemolysis detected by analyzer. Result may be falsely elevated.   05/06/2022 4.2 3.5 - 5.0 mmol/L Final     Chloride   Date Value Ref Range Status   11/17/2023 103 98 - 107 mmol/L Final   05/06/2022 103 98 - 111 mmol/L Final     CO2   Date Value Ref Range Status   11/17/2023 25.0 22.0 - 29.0 mmol/L Final   05/06/2022 21 (L) 22 - 29 mmol/L Final     Calcium   Date Value Ref Range Status   11/17/2023 9.0 8.6 - 10.5 mg/dL Final   05/06/2022 10.0 8.6 - 10.0 mg/dL Final     ALT (SGPT)   Date Value Ref Range Status   11/17/2023 15 1 - 33 U/L Final   05/06/2022 21 5 - 33 U/L Final     AST (SGOT)   Date Value Ref Range Status   11/17/2023 18 1 - 32 U/L Final   05/06/2022 20 5 - 32 U/L Final     WBC   Date Value Ref Range Status   11/17/2023 8.24 3.40 - 10.80 10*3/mm3 Final   05/06/2022 10.5 4.8 - 10.8 K/uL Final     Hematocrit   Date Value Ref Range Status   11/17/2023 38.4 34.0 - 46.6 % Final   05/06/2022 42.0 37.0 - 47.0 % Final     Platelets   Date Value Ref Range Status   11/17/2023 297 140 - 450 10*3/mm3 Final   05/06/2022 353 130 - 400 K/uL Final     Hemoglobin A1C   Date Value Ref Range Status   05/06/2022 5.4 4.0 - 6.0 % Final     Comment:     HbA1c levels >6% are an indication of hyperglycemia during the preceding 2  to 3 months or longer.    HbA1c levels may reach 20% or higher in poorly controlled diabetes.  Therapeutic action is suggested at levels above 8%.    Diabetes patients with HbA1c levels below 7% meet the goal of the American  Diabetes Association.    HbA1c levels below the established reference range may indicate recent  episodes of hypoglycemia, the presence of Hb variants, or shortened lifetime  of erythrocytes.          Assessment / Plan     Assessment/Plan:  Diagnoses and all orders for this  visit:    1. Routine general medical examination at a health care facility (Primary)  -     CBC & Differential  -     Comprehensive Metabolic Panel  -     Lipid Panel  -     TSH  -     Vitamin B12  -     Hemoglobin A1c  -     T4, Free    2. Need for hepatitis C screening test  -     Hepatitis C Antibody    3. Generalized anxiety disorder  -     venlafaxine XR (Effexor XR) 37.5 MG 24 hr capsule; Take 1 capsule by mouth Daily.  Dispense: 30 capsule; Refill: 0     She is overmedicated with Serotonin inhibitors. I have recommended that she start to wean the Effexor and have sent in RX for the decreased dose. In about 2 weeks, she will either message me or Dex to discuss Wellbutrin or Auveilty. She will need to wean completely off Effexor eventually. Pt verbalizes understanding.     Transcribed from ambient dictation for SEN Perez by Bonnie Peñaloza.  12/08/23   10:29 CST    Patient or patient representative verbalized consent to the visit recording.  I have personally performed the services described in this document as transcribed by the above individual, and it is both accurate and complete.  SEN Perez  12/8/2023  12:11 CST        Will have lab work here today. Encouraged SBE, pt is aware how to do self breast exam and the importance of same. Discussed weight management and importance of maintaining a healthy weight. Discussed Vitamin D intake and the importance of adequate vitamin D for both Bone Health and a healthy immune system.  Discussed Daily exercise and the importance of same, in regards to a healthy heart as well as helping to maintain her weight and improving her mental health.  BMI is mildly elevated but is improving.    Return in about 1 month (around 1/8/2024). unless patient needs to be seen sooner or acute issues arise.    Code Status: Full.     I have discussed the patient results/orders and and plan/recommendation with them at today's visit.      Thalia  Norah Avilez, SEN   12/08/2023

## 2023-12-09 LAB
ALBUMIN SERPL-MCNC: 4.3 G/DL (ref 4–5)
ALBUMIN/GLOB SERPL: 1.5 {RATIO} (ref 1.2–2.2)
ALP SERPL-CCNC: 110 IU/L (ref 44–121)
ALT SERPL-CCNC: 16 IU/L (ref 0–32)
AST SERPL-CCNC: 15 IU/L (ref 0–40)
BASOPHILS # BLD AUTO: 0 X10E3/UL (ref 0–0.2)
BASOPHILS NFR BLD AUTO: 0 %
BILIRUB SERPL-MCNC: 0.2 MG/DL (ref 0–1.2)
BUN SERPL-MCNC: 12 MG/DL (ref 6–20)
BUN/CREAT SERPL: 16 (ref 9–23)
CALCIUM SERPL-MCNC: 10 MG/DL (ref 8.7–10.2)
CHLORIDE SERPL-SCNC: 103 MMOL/L (ref 96–106)
CHOLEST SERPL-MCNC: 195 MG/DL (ref 100–199)
CO2 SERPL-SCNC: 20 MMOL/L (ref 20–29)
CREAT SERPL-MCNC: 0.77 MG/DL (ref 0.57–1)
EGFRCR SERPLBLD CKD-EPI 2021: 110 ML/MIN/1.73
EOSINOPHIL # BLD AUTO: 0 X10E3/UL (ref 0–0.4)
EOSINOPHIL NFR BLD AUTO: 0 %
ERYTHROCYTE [DISTWIDTH] IN BLOOD BY AUTOMATED COUNT: 12.9 % (ref 11.7–15.4)
GLOBULIN SER CALC-MCNC: 2.8 G/DL (ref 1.5–4.5)
GLUCOSE SERPL-MCNC: 81 MG/DL (ref 70–99)
HBA1C MFR BLD: 5.7 % (ref 4.8–5.6)
HCT VFR BLD AUTO: 40.8 % (ref 34–46.6)
HCV IGG SERPL QL IA: NON REACTIVE
HDLC SERPL-MCNC: 80 MG/DL
HGB BLD-MCNC: 13.5 G/DL (ref 11.1–15.9)
IMM GRANULOCYTES # BLD AUTO: 0 X10E3/UL (ref 0–0.1)
IMM GRANULOCYTES NFR BLD AUTO: 0 %
LDLC SERPL CALC-MCNC: 93 MG/DL (ref 0–99)
LYMPHOCYTES # BLD AUTO: 2.4 X10E3/UL (ref 0.7–3.1)
LYMPHOCYTES NFR BLD AUTO: 46 %
MCH RBC QN AUTO: 28.9 PG (ref 26.6–33)
MCHC RBC AUTO-ENTMCNC: 33.1 G/DL (ref 31.5–35.7)
MCV RBC AUTO: 87 FL (ref 79–97)
MONOCYTES # BLD AUTO: 0.4 X10E3/UL (ref 0.1–0.9)
MONOCYTES NFR BLD AUTO: 8 %
NEUTROPHILS # BLD AUTO: 2.3 X10E3/UL (ref 1.4–7)
NEUTROPHILS NFR BLD AUTO: 46 %
PLATELET # BLD AUTO: 254 X10E3/UL (ref 150–450)
POTASSIUM SERPL-SCNC: 4.6 MMOL/L (ref 3.5–5.2)
PROT SERPL-MCNC: 7.1 G/DL (ref 6–8.5)
RBC # BLD AUTO: 4.67 X10E6/UL (ref 3.77–5.28)
SODIUM SERPL-SCNC: 141 MMOL/L (ref 134–144)
T4 FREE SERPL-MCNC: 1.13 NG/DL (ref 0.82–1.77)
TRIGL SERPL-MCNC: 129 MG/DL (ref 0–149)
TSH SERPL DL<=0.005 MIU/L-ACNC: 1.7 UIU/ML (ref 0.45–4.5)
VIT B12 SERPL-MCNC: 640 PG/ML (ref 232–1245)
VLDLC SERPL CALC-MCNC: 22 MG/DL (ref 5–40)
WBC # BLD AUTO: 5.1 X10E3/UL (ref 3.4–10.8)

## 2023-12-26 ENCOUNTER — E-VISIT (OUTPATIENT)
Dept: FAMILY MEDICINE CLINIC | Facility: TELEHEALTH | Age: 23
End: 2023-12-26
Payer: COMMERCIAL

## 2023-12-26 NOTE — E-VISIT TREATED
Chief Complaint: Cold, flu, COVID, sinus, hay fever, or seasonal allergies   Patient introduction   Patient is 23-year-old female with cough, nasal discharge, sore throat, voice hoarseness, headache, fever (which may have resolved; see below), myalgia, and fatigue that started 3 to 5 days ago. Regarding date of symptom onset, patient writes: 12/22/2023.   COVID-19 testing history, vaccination status, and exposure:    Patient was tested for COVID-19 within the last week. Patient specifies date of test as 12/23/2023. Test result was negative.    Patient took a self-test during the interview. Test result was negative.    Has not received an updated [id number] COVID-19 vaccination (Pfizer-BioNTech or Moderna vaccine after September 12, 2023; or Novavax vaccine after October 3, 2023).    No known exposure to a person with a confirmed or suspected case of COVID-19.    No high-risk (household) exposure to COVID-19 within the last 14 days.   Risk factors for severe disease from COVID-19 infection    Higher risk for severe disease from COVID-19 because of BMI >= 25.   Warning. The following may warrant further investigation:    Headache described as severe (7 to 9 on a scale of 1 to 10).    Dizziness that makes it hard to stand, walk, or do daily activities.   General presentation   Symptoms came on suddenly.   Fever:    Patient did not measure their temperature, but it felt mild.    Patient had a fever that lasted less than 24 hours, but it has resolved.    Patient's last fever was 24 to 48 hours ago.   Sinus and nasal symptoms:    Nasal discharge.    Clear nasal drainage.    Nasal drainage is thick.    Postnasal drip.    Sinus pain or pressure on or around the forehead and eyes.    Patient first noticed sinus pain less than 5 days ago.    Sinus pain is not worse with Valsalva.    No nasal or sinus congestion.    No itchy nose or sneezing.    No history of unhealed nasal septal ulcer/nasal wound.    No history of  antibiotic treatment for sinus infection in the last year.    No history of deviated septum or nasal polyps.   Throat symptoms:    Sore throat.    Has discomfort when swallowing but can swallow liquids and solid foods.    Voice is moderately hoarse. Patient does not believe hoarseness is due to voice strain.   Head and body aches:    Headache described as severe (7 to 9 on a scale of 1 to 10).    Myalgia.    Fatigue.    No sweats.    No chills.   Cough:    Cough is worse in the morning.    Cough is not productive of sputum.   Wheezing and shortness of breath:    Patient has an Apple Watch or smart watch. They have been wearing the device since their symptoms started.    They have not received any alerts of an elevated heart rate from the device.    No COPD diagnosis.    No asthma diagnosis.    No wheezing.    No shortness of breath.    No previous albuterol inhaler use for URIs, bronchitis, or pneumonia.    No previous steroid inhaler use for URIs, bronchitis, or pneumonia.   Chest pain:    No chest pain.   Ear symptoms:    Current symptoms include pressure in the ear(s).   Dizziness:    Dizziness that interferes with daily activities.   Allergies:    No history of allergies.   Flu exposure:    No recent known exposure to a person with a confirmed flu diagnosis.    Has not had a flu vaccine this season.   Patient is taking over-the-counter medications for current symptoms, including acetaminophen and guaifenesin/dextromethorphan.   Review of red flags/alarm symptoms:    No changes in alertness or awareness.    No paroxysmal cough followed by whoop on inspiration    No symptoms suggesting airway obstruction.    No symptoms suggesting intracranial hemorrhage.    No decreased urination.    No blue or gray coloring present in face, lips, or nail beds.    No swelling, pain, redness, or increased warmth in the calf or lower part of ONE leg only.   Risk factors for antibiotic resistance:    Antibiotic use for similar  "symptoms within the last 30 days.   Pregnancy/menstrual status/breastfeeding:    Not pregnant.    Not breastfeeding.    Regarding date of last menstrual period, patient writes: This week. Started Sunday 12/24.   Self-exam:    Height: 5' 4\"    Weight: 170 lbs    Tonsillar edema.    Purulent tonsils.    No palatal petechiae.    Swollen/painful neck lymph nodes.   Recent antibiotic use:    Has taken antibiotics for similar symptoms within the past month. Patient specifies the antibiotics taken as Amoxicillin for strep.   Current medications   Currently taking Butalbital-APAP-Caff-Cod -46-30 MG capsule, sertraline 100 MG tablet, tiZANidine 4 MG tablet, Rosy 3-0.03 MG per tablet, eletriptan 20 MG tablet, amitriptyline 75 MG tablet, ondansetron ODT 4 MG disintegrating tablet, and venlafaxine XR 37.5 MG 24 hr capsule.   Medication allergies   No relevant drug allergies.   Medication contraindication review   No history of anaphylactic reaction to beta-lactam antibiotics; aspirin triad; blood dyscrasia; bone marrow depression; catecholamine-releasing paraganglioma; coronary artery disease; coagulation disorder; congenital long QT syndrome; depression; electrolyte abnormalities; fungal infection; GI bleeding; GI obstruction; G6PD deficiency; heart arrhythmia; hypertension; mononucleosis; myasthenia; recent myocardial infarction; NSAID-induced asthma/urticaria; Parkinson's disease; pheochromocytoma; porphyria; Reye syndrome; seizure disorder; ulcerative colitis; and urinary retention.   No history of metoclopramide-associated dystonic reaction and tardive dyskinesia.   No known history of amoxicillin-clavulanate-associated cholestatic jaundice or hepatic impairment.   No known history of azithromycin-associated cholestatic jaundice or hepatic impairment.   Past medical history   Immune conditions:   No immunocompromising conditions.   No history of cancer.   Social history   Never smoked tobacco.   High-risk household " contacts:    Household contact with diabetes.   Patient-submitted comments   Patient was asked if they had anything to add about their symptoms. 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 I believe I have it again. Have tested for Covid, it was negative. Flu I don't have the symptoms of. .   Patient requests a 3-day excuse note.   Assessment   Strep pharyngitis.   This is the likely diagnosis based on patient's interview responses, including:    Symptom profile   Plan   Medications:    amoxicillin 500 mg capsule RX 500mg 1 cap PO bid 10d for infection. This medication is an antibiotic. Take it exactly as directed. You must finish the entire course of medication, even if you feel better after the first few days of treatment. Amount is 20 cap.   The patient's prescription will be sent to:   [facility]/pharmacy #[id number] [address] [address]   Phone: [phone]     Fax: [phone]   Other:   Patient was given an excuse note for 3 days.   Education:    Condition and causes    Prevention    Treatment and self-care    When to call provider   ----------   Electronically signed by SEN Ruiz on 2023-12-26 at 17:32PM   ----------   Patient Interview Transcript:   Which of these symptoms are bothering you? Select all that apply.    Cough    Runny nose    Sore throat    Hoarse voice or loss of voice    Headache    Fever    Muscle or body aches    Fatigue or tiredness   Not selected:    Shortness of breath    Stuffed-up nose or sinuses    Itchy or watery eyes    Itchy nose or sneezing    Loss of smell or taste    Sweats    Chills    Nausea or vomiting    Diarrhea    I don't have any of these symptoms   When did your current symptoms start? Select one.    3 to 5 days ago   Not selected:    Less than 48 hours ago    6 to 9 days ago    10 to 14 days ago    2 to 3 weeks ago    3 to 4 weeks ago    More than a month ago   Do you know the exact date your symptoms started? If  "so, enter the date as MM/DD/YY. Select one.    Yes (specify): 12/22/2023   Not selected:    No   Did your symptoms come on suddenly or gradually? Select one.    Suddenly   Not selected:    Gradually    I'm not sure   Since your current symptoms started, have you been tested for COVID-19? This includes home self-tests as well as nose swab or saliva tests done at a doctor's office, lab, or testing site. Select one.    Yes   Not selected:    No   When was your most recent COVID-19 test? Select one.    Within the last week (specify date as MM/DD/YY): 12/23/2023   Not selected:    Within the last 24 hours    7 to 14 days ago    15 to 30 days ago    More than 1 month ago   What was the result of your most recent COVID-19 test? Select one.    Negative   Not selected:    Positive   Even though your last test was negative, you could still have COVID. You may have tested before the virus was detectable. In some cases, repeat testing over several days is needed. - If you have a COVID test kit, take the test now before continuing this interview. - If you choose not to take a test or don't have one, you should still continue this interview. Your provider can still help you get care. Do you have a COVID test kit? Select one.    Yes, and I'll take another test now   Not selected:    Yes, but I prefer not to take a test now    No, I don't have a test kit   What is the result of the COVID-19 home test you just took? Select one.    Negative   Not selected:    Positive   Has anyone in your household tested positive for COVID-19 in the past 14 days? Select one.    No   Not selected:    Yes   In the last 14 days, have you had close contact with someone who has COVID-19? \"Close contact\" means any of these: - Caring for someone with COVID-19. - Being within 6 feet of someone with COVID-19 for a total of at least 15 minutes over a 24-hour period. For example, three 5-minute exposures for a total of 15 minutes. - Being in direct contact " with respiratory droplets from someone with COVID-19 (being coughed on, kissing, sharing utensils). Select one.    No, not that I know of   Not selected:    Yes, a confirmed case    Yes, a suspected case   Have you gotten the [id number] updated COVID-19 vaccine? This means either the updated Pfizer-BioNTech or Moderna vaccine after September 12, 2023; or the updated Novavax vaccine after October 3, 2023. Select one.    No   Not selected:    Yes   Since your symptoms started, have you felt dizzy? Select one.    Yes, and it makes it hard to stand, walk, or do daily activities   Not selected:    Yes, but I can still do my regular daily activities    No   Do you have any of these devices at home? Select all that apply.    Apple Watch or other smart watch   Not selected:    A home pulse oximeter    Home blood pressure monitor    FitBit or other smart wristband    Other wearable device    No   Have you been wearing the device since your symptoms started? Select one.    Yes   Not selected:    No   Has the device alerted you about a higher than normal heart rate? This doesn't include alerts received during exercise. Select one.    No   Not selected:    Yes, 120 beats per minute or higher    Yes, but less than 120 beats per minute   You mentioned having a fever. Do you have a fever now? Select one.    No, it's gone now   Not selected:    Yes, and I've had one since my symptoms started    Yes, but I didn't have one when my symptoms started   Did you take your temperature with a thermometer? Select one.    No, but it felt mild   Not selected:    No, but it felt high    Yes   How long did your fever last? Select one.    Less than 24 hours   Not selected:    1 to 3 days    4 or more days   When did you last have a fever? Select one.    24 to 48 hours ago   Not selected:    Within the last 24 hours    More than 2 days ago   You mentioned having a headache. On a scale of 1 to 10, how severe is your headache pain? Select one.    " Severe (7 to 9)   Not selected:    Mild (1 to 3)    Moderate (4 to 6)    Unbearable (10)    The worst headache of my life (10+)   Do you cough so hard that it's made you gag or vomit? By gag, we mean has your coughing made you choke or dry heave? Select all that apply.    No   Not selected:    Yes, my coughing has made me gag    Yes, my coughing has made me vomit   Does your cough come in spasms of 10 to 20 coughs in a row, followed by a loud whoop when breathing in? Select one.    No   Not selected:    Yes   When is your cough the worst? Select all that apply.    In the morning, or when I wake up   Not selected:    During the day    At nighttime, or while I'm sleeping    I haven't noticed a difference depending on time of day   Are you coughing up mucus or phlegm? Select one.    No, my cough is dry   Not selected:    Yes, a little    Yes, a lot   Do you feel sinus pain or pressure in any of these areas?    In my forehead    Around my eyes   Not selected:    Behind my nose    In my cheeks    In my upper teeth or jaw    No   When did you first notice your sinus pain or pressure? Select one.    Less than 5 days ago   Not selected:    5 to 9 days ago    10 to 14 days ago    2 to 4 weeks ago    1 month ago or longer   Does coughing, sneezing, or leaning forward make your sinuses feel worse? Select one.    No   Not selected:    Yes   What color is your nasal drainage? Select one.    Clear   Not selected:    White    Yellow or yellowish    Green or greenish    My nose is stuffed but not draining or running   Is your nasal drainage thick or thin? Select one.    Thick   Not selected:    Thin   Is there any drainage (mucus) going down the back of your throat? This kind of drainage is also called \"postnasal drip.\" It can cause frequent throat clearing. Select one.    Yes   Not selected:    No, not that I know of   Can you swallow liquids and solid foods? A sore throat may be painful when swallowing, but it shouldn't prevent " you from swallowing. Select one.    Yes, but it's uncomfortable   Not selected:    Yes, with ease    Yes, but it's painful    It's hard to swallow anything because it feels like liquids and food get stuck in my throat    No, I can't swallow anything, liquid or solid foods   Is your throat pain worse on one side than the other? Select one.    No, it's the same on both sides   Not selected:    Yes, it's worse on the right side    Yes, it's worse on the left side   Do you have chest pain? You might also feel it as discomfort, aching, tightness, or squeezing in the chest. Select one.    No   Not selected:    Yes   Have you urinated at least 3 times in the last 24 hours? Select one.    Yes   Not selected:    No   Do your face, lips, or nail beds appear blue or gray? Select one.    No   Not selected:    Yes   Do you have any swelling, pain, redness, or increased warmth in the calf or lower part of ONE leg only? Select one.    No   Not selected:    Yes   Changes in alertness or awareness may mean you need emergency care. Since your symptoms started, have you had any of these? Select all that apply.    None of the above   Not selected:    Confusion    Slurred speech    Not knowing where you are or what day it is    Difficulty staying conscious    Fainting or passing out   Do your symptoms include a whistling sound, or wheezing, when you breathe? Select one.    No   Not selected:    Yes   Early in this interview, you told us you were hoarse or you'd lost your voice. How would you describe the changes to your voice? Select one.    It's harder than usual to talk   Not selected:    It just sounds a little raspy    I can barely talk at all   Is it possible that you strained your voice? Singing, yelling, or talking more or louder than usual can cause voice strain. Select one.    No, not that I know of   Not selected:    Yes   Do you have any of these symptoms in your ear(s)? Select all that apply.    Pressure   Not selected:     Pain    Fullness    Crackling or popping    Plugged or blocked sensation    None of the above   Are your tonsils larger than usual?    Yes   Not selected:    No, not that I can tell    I've had my tonsils removed   Is there any white or yellow pus on your tonsils?    Yes   Not selected:    No, not that I can see   Are there red spots on the roof of your mouth or the back of your throat?    No, not that I can see   Not selected:    Yes   Are your glands/lymph nodes swollen, or does it hurt when you touch them?    Yes   Not selected:    No, not that I can tell   In the past week, has anyone around you (such as at school, work, or home) had a confirmed diagnosis of the flu? A confirmed diagnosis means that a nose swab was done to verify a flu infection. Select all that apply.    No, not that I know of   Not selected:    I live with someone who has the flu    I've been within touching distance of someone who has the flu    I've walked by, or sat about 3 feet away from, someone who has the flu    I've been in the same building as someone who has the flu   Have you ever been diagnosed with asthma? Select one.    No   Not selected:    Yes   Have you ever been prescribed albuterol to use for wheezing, cough, or shortness of breath caused by a cold, bronchitis, or pneumonia? Albuterol (ProAir, Proventil, Ventolin) is prescribed as an inhaler or a solution to be used with a nebulizer machine. Select one.    No, not that I know of   Not selected:    Yes   Have you ever been prescribed a steroid inhaler to use for wheezing, cough, or shortness of breath caused by a cold, bronchitis, or pneumonia? Some examples of steroid inhalers include Pulmicort, Flovent, Qvar, and Alvesco. Select one.    No, not that I know of   Not selected:    Yes   Have you ever been diagnosed with chronic obstructive pulmonary disease (COPD)? Select one.    No, not that I know of   Not selected:    Yes   In the past month, have you taken antibiotics for  similar symptoms? Examples of antibiotics include amoxicillin, amoxicillin-clavulanate (Augmentin), penicillin, cefdinir (Omnicef), doxycycline, and clindamycin (Cleocin). Select one.    Yes (specify): Amoxicillin for strep   Not selected:    No   In the last year, how many times were you treated with antibiotics for a sinus infection? Select one.    None   Not selected:    1 to 3 times    4 or more times   Have you been diagnosed with a deviated septum or nasal polyps? The nose is divided into two nostrils by the septum. A crooked septum is called a deviated septum. Nasal polyps are growths inside the nose or sinuses. Select one.    No, not that I know of   Not selected:    Yes, but I had surgery to treat them    Yes, I have a deviated septum    Yes, I have nasal polyps    Yes, I have a deviated septum and nasal polyps   Do you have a sore inside your nose that won't heal? Select one.    No, not that I know of   Not selected:    Yes   Do you have allergies (pollen, dust mites, mold, animal dander)? Select one.    No, not that I know of   Not selected:    Yes   Have you had a flu shot this season? Select one.    No   Not selected:    Yes, less than 2 weeks ago    Yes, 2 to 4 weeks ago    Yes, 1 to 3 months ago    Yes, 3 to 6 months ago    Yes, more than 6 months ago   Are you pregnant? Select one.    No   Not selected:    Yes   When was your last menstrual period? If you don't currently have periods or no longer have periods, please briefly explain.    This week. Started    Within the last 2 weeks, have you: - Given birth - Had a miscarriage - Had a pregnancy loss - Had an  Being postpartum (live birth or loss) within the last 2 weeks increases your risk of flu complications. Select one.    No   Not selected:    Yes   Are you breastfeeding? Select one.    No   Not selected:    Yes   The flu and COVID-19 can be more serious for people in certain groups. The next few questions help us figure out if  you or anyone you live with is at higher risk for complications from these infections. Do any of these statements apply to you? Select all that apply.    None of the above   Not selected:    I'm     I'm     I'm Black    I'm  or    Do you smoke tobacco? Select one.    No   Not selected:    Yes, every day    Yes, some days    No, I quit   Do you have a mostly inactive lifestyle? Answer yes if all of these are true: - You spend at least 6 hours a day sitting or lying down - You get less than 2 and a half hours per week of moderate exercise such as walking fast - You get less than 1 hour and 15 minutes per week of intense exercise such as jogging or running Select one.    No   Not selected:    Yes   Do you have any of these conditions? Select all that apply.    None of the above   Not selected:    Chronic lung disease, such as cystic fibrosis or interstitial fibrosis    Heart disease, such as congenital heart disease, congestive heart failure, or coronary artery disease    Disorder of the brain, spinal cord, or nerves and muscles, such as dementia, cerebral palsy, epilepsy, muscular dystrophy, or developmental delay    Metabolic disorder or mitochondrial disease    Cerebrovascular disease, such as stroke or another condition affecting the blood vessels or blood supply to the brain    Down syndrome    Mood disorder, including depression or schizophrenia spectrum disorders    Substance use disorder, such as alcohol, opioid, or cocaine use disorder    Tuberculosis    Primary immunodeficiency   Do you live in a group care setting? Examples include: - Nursing home - Residential care - Psychiatric treatment facility - Group home - [facility] - Board and care home - Homeless shelter - Foster care setting Select one.    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 To determine your BMI, we need to know your weight and height. Please enter your weight (in pounds).    Weight   Please enter your height.    Height   Do you have any of these conditions that can affect the immune system? Scroll to see all options. Select all that apply.    None of these   Not selected:    History of bone marrow transplant    Chronic kidney disease    Chronic liver disease (including cirrhosis)    HIV/AIDS    Inflammatory bowel disease (Crohn's disease or ulcerative colitis)    Lupus    Moderate to severe plaque psoriasis    Multiple sclerosis    Rheumatoid arthritis    Sickle cell anemia    Alpha or beta thalassemia    History of solid organ transplant (kidney, liver, or heart)    History of spleen removal    An autoimmune disorder not listed here (specify)    A condition requiring treatment with long-term use of oral steroids (such as prednisone, prednisolone, or dexamethasone) (specify)   Have you ever been diagnosed with cancer? Select one.    No   Not selected:    Yes, I have cancer now    Yes, but I'm in remission   Do any of these apply to you? Select all that apply.    None of the above   Not selected:    I've been hospitalized within the last 5 days    I have diabetes    I'm in close contact with a child in    The flu and COVID-19 can be more serious for people in certain groups. Do any of these apply to the people who live with you? Select all that apply.    None of the above   Not selected:    Under age 5    Over age 65            Black     or     Pregnant    Has given birth, had a miscarriage, had a pregnancy loss, or had an  in the last 2 weeks   Does any member of your household have any of these medical conditions? Select all that apply.    Diabetes   Not selected:    Asthma    Disorders of the brain, spinal cord, or nerves and muscles, such as dementia,  cerebral palsy, epilepsy, muscular dystrophy, or developmental delay    Chronic lung disease, such as COPD or cystic fibrosis    Heart disease, such as congenital heart disease, congestive heart failure, or coronary artery disease    Cerebrovascular disease, such as stroke or another condition affecting the blood vessels or blood supply to the brain    Blood disorders, such as sickle cell disease    Metabolic disorders such as inherited metabolic disorders or mitochondrial disease    Kidney disorders    Liver disorders    Weakened immune system due to illness or medications such as chemotherapy or steroids    Children under the age of 19 who are on long-term aspirin therapy    Extreme obesity (BMI > 40)    None of the above   Do you have any of these conditions? Scroll to see all options. Select all that apply.    None of the above   Not selected:    Aspirin triad (also known as Samter's triad or ASA triad)    Asthma or hives from taking aspirin or other NSAIDs, such as ibuprofen or naproxen    Blockage or narrowing of the blood vessels of the heart    Blood clotting disorder    Blood dyscrasia, such anemia, leukemia, lymphoma, or myeloma    Bone marrow depression    Catecholamine-releasing paraganglioma    Congenital long QT syndrome    Depression    Difficulty urinating or completely emptying your bladder    Uncorrected electrolyte abnormalities    Fungal infection    Gastrointestinal (GI) bleeding    Gastrointestinal (GI) obstruction    G6PD deficiency    Recent heart attack    High blood pressure    Irregular heartbeat or heart rhythm    Mononucleosis (mono)    Myasthenia gravis    Parkinson's disease    Pheochromocytoma    Reye syndrome    Seizure disorder    Thyroid disease    Ulcerative colitis   Have you ever had either of these conditions? Select all that apply.    No   Not selected:    Metoclopramide-associated dystonic reaction    Tardive dyskinesia   Just a few more questions about medications, and then  you're finished. Have you used any non-prescription medications or nasal sprays for your current symptoms? Examples include saline sprays, decongestants, NyQuil, and Tylenol. Select one.    Yes   Not selected:    No   Which of these non-prescription medications have you tried? Scroll to see all options. Select all that apply.    Acetaminophen (Tylenol)    Guaifenesin/dextromethorphan (Delsym DM, Mucinex DM, Robitussin DM)   Not selected:    Budesonide (Rhinocort)    Cetirizine (Zyrtec)    Chlorpheniramine (Aller-chlor, Chlor-Trimeton)    Cromolyn (NasalCrom)    Dextromethorphan (Delsym, Robitussin, Vicks DayQuil Cough)    Diphenhydramine (Benadryl)    Fexofenadine (Allegra)    Fluticasone (Flonase)    Guaifenesin (Mucinex)    Ibuprofen (Advil, Motrin, Midol)    Ketotifen (Alaway, Zaditor)    Loratadine (Alavert, Claritin)    Naphazoline-pheniramine (Naphcon-A, Opcon-A, Visine-A)    Omeprazole (Prilosec)    Oxymetazoline (Afrin)    Phenylephrine (Sudafed PE)    Triamcinolone (Nasacort)    None of the above   Have you taken any monoamine oxidase inhibitor (MAOI) medications in the last 14 days? Examples include rasagiline (Azilect), selegiline (Eldepryl, Zelapar), isocarboxazid (Marplan), phenelzine (Nardil), and tranylcypromine (Parnate). Select one.    No, not that I know of   Not selected:    Yes   Do you take Kynmobi or Apokyn (apomorphine)? Select one.    No   Not selected:    Yes   Are you still taking these medications listed in your medical record? If you're not taking any of these, click Next. Select all that apply.    Butalbital-APAP-Caff-Cod -02-30 MG capsule    sertraline 100 MG tablet    tiZANidine 4 MG tablet    Rosy 3-0.03 MG per tablet    eletriptan 20 MG tablet    amitriptyline 75 MG tablet    ondansetron ODT 4 MG disintegrating tablet    venlafaxine XR 37.5 MG 24 hr capsule   Are you taking any other medications, vitamins, or supplements? Select one.    No   Not selected:    Yes   Have you  "ever had an allergic or bad reaction to any medication? Select one.    Yes   Not selected:    No   Have you had an allergic or bad reaction to any of these medications? Select all that apply.    No, not that I know of   Not selected:    Baloxavir (Xofluza)    Benzonatate (Tessalon Perles)    Fluconazole, itraconazole, or terconazole (brands include Diflucan, Sporanox, Terazol)    Oseltamivir (Tamiflu) or zanamivir (Relenza)    Paxlovid, nirmatrelvir, or ritonavir (Norvir)   Have you had an allergic or bad reaction to any of these antibiotic medications? Select all that apply.    No, not that I know of   Not selected:    Penicillin or any \"-cillin\" antibiotic, such as amoxicillin, ampicillin, dicloxacillin, nafcillin, or piperacillin (Brands include Augmentin, Unasyn, and Zosyn)    Tetracycline or any \"-cycline\" antibiotic, such as doxycycline, demeclocycline, minocycline (Brands include Declomycin, Doryx, Dynacin, Oracea, Monodox, Panmycin, and Vibramycin)    Ciprofloxacin or any \"-floxacin\" antibiotic, such as gemifloxacin, levofloxacin, moxifloxacin, or ofloxacin (Brands include Factive, Cipro, Floxin, and Levaquin)    Cephalexin or any \"cef-\" antibiotic, such as cefazolin, cefdinir, cefuroxime, ceftriaxone, ceftazidime, or cefepime (Brands include Ancef, Ceftin, Fortaz, Keflex, Maxipime, Rocephin, and Simplicef)    Azithromycin or any \"-thromycin\" antibiotic, such as erythromycin or clarithromycin (Brands include Biaxin, Erythrocin, Z-patricia, and Zithromax)    Clindamycin or lincomycin (Brands include Cleocin and Lincocin)   Have you had an allergic or bad reaction to any of these medications? Select all that apply.    No, not that I know of   Not selected:    Albuterol or a similar medication    Atropine    Corticosteroid (steroid) medication, including topical steroids, inhaled steroids, nasal steroids, or oral steroids (budesonide, ciclesonide, dexamethasone, flunisolide, fluticasone, methylprednisolone, " triamcinolone, prednisone (or brand names Alvesco, Deltasone, Flovent, Medrol, Nasacort, Rhinocort, or Veramyst)    Metoclopramide (Reglan)    Ondansetron (Zuplenz, Zofran ODT, Zofran)    Prochlorperazine (Compazine)   Have you had an allergic or bad reaction to any of these eye drops, nasal sprays, or inhalers? Scroll to see all options. Select all that apply.    No, not that I know of   Not selected:    Azelastine (Astelin, Astepro, Optivar)    Cromolyn (Crolom, NasalCrom)    Ipratropium (Atrovent)    Ketotifen (Alaway, Zaditor)    Pheniramine/naphazoline (Naphcon-A, Opcon-A, Visine-A)    Olopatadine (Pataday, Patanol, Pazeo)   Have you had an allergic or bad reaction to any of these non-prescription medications? Scroll to see all options. Select all that apply.    No, not that I know of   Not selected:    Acetaminophen (Tylenol)    Aspirin    Cetirizine (Zyrtec)    Dextromethorphan (Delsym, Robitussin, Vicks DayQuil Cough)    Diphenhydramine (Benadryl)    Fexofenadine (Allegra)    Guaifenesin (Mucinex)    Dextromethorphan (Delsym)    Ibuprofen (Advil, Motrin, Midol)    Loratadine (Alavert, Claritin)    Oxymetazoline (Afrin)    Phenylephrine (Sudafed PE)    Pseudoephedrine (Sudafed)   Are you allergic to milk or to the proteins found in milk (for example, whey or casein)? A milk allergy is different from lactose intolerance. Select one.    No, not that I know of   Not selected:    Yes   Have you ever had jaundice or liver problems as a result of taking amoxicillin-clavulanate (Augmentin)? Jaundice is a condition in which the skin and the whites of the eyes turn yellow. Select all that apply.    No, not that I know of   Not selected:    Yes, jaundice    Yes, liver problems   Have you ever had jaundice or liver problems as a result of taking azithromycin (Zithromax, Zmax)? Jaundice is a condition in which the skin and the whites of the eyes turn yellow. Select all that apply.    No, not that I know of   Not  selected:    Yes, jaundice    Yes, liver problems   Do you need a doctor's note? A doctor's note confirms that you received care today and states when you can return to school or work. It does not contain information about your diagnosis or treatment plan. Your provider will make the final decision on whether to give you a doctor's note and for how long. Doctor's notes CANNOT be backdated. We can't provide medical leave paperwork through this type of visit. If more paperwork is needed to request time off, contact your primary care provider. Select one.    3 days   Not selected:    Today only (1 day)    Today and tomorrow (2 days)    5 days    7 days    No   Is there anything you'd like to add about your symptoms? Please limit your comments to the symptoms asked about in this interview. If you include comments about other concerns, your provider may recommend that you be seen in person.    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 I believe I have it again. Have tested for Covid, it was negative. Flu I don't have the symptoms of. __   ----------   Medical history   Medical history data does not currently exist for this patient.

## 2023-12-26 NOTE — EXTERNAL PATIENT INSTRUCTIONS
View Doctor's Note     Diagnosis   Strep pharyngitis (strep throat)   My name is SEN Ruiz, and I'm a healthcare provider at [facility]. I reviewed your interview, and I see that you have strep throat. This can be a painful condition, but it responds well to treatment.   To prevent the spread of illness to others, stay home and away from other people as much as possible while you're sick. When you need to be around other people, consider wearing a face mask.   I've given you a doctor's note for 3 days.   Medications   Your pharmacy   [facility] #[id number] [address] [facility] [id number] [phone]     Prescription   Amoxicillin (500mg): Take 1 capsule by mouth twice a day for 10 days for infection. This medication is an antibiotic. Take it exactly as directed. You must finish the entire course of medication, even if you feel better after the first few days of treatment.    Start taking the antibiotics I've prescribed right away. You need to finish the entire course of antibiotics, even if you start to feel better before the pills run out.    Some people develop a yeast infection after taking antibiotics. If you get a yeast infection, you can treat it with antifungal creams or suppositories. These are available without a prescription at [employer] and many supermarkets.   About your diagnosis   Strep throat is an infection in the throat and tonsils caused by group A streptococcus bacteria. Strep throat is most often spread by droplets that are released into the air after an infected person coughs or sneezes. You can also get strep throat by sharing cups or utensils with an infected person.   Symptoms usually begin within 2 to 5 days after a person has been exposed. The pain from strep throat usually starts quickly and can be severe, especially when swallowing. Body aches and pains are common.   What to expect   If you follow this treatment plan, you should start to feel better within a few days.    "When to seek care   Call us at [phone]   with any sudden or unexpected symptoms.    Symptoms that don't improve within 48 hours of starting antibiotics.    Symptoms that get better for a few days and then suddenly get worse.    Worsening fever.    Your throat pain becomes worse and makes swallowing extremely difficult or impossible.    Muffled voice (it may sound like you are talking with a mouthful of food).    Difficulty opening your mouth or jaw.    Stiff neck.    Joint pain, or swelling that moves from joint to joint.    Severe stomach pain.    Shortness of breath.    Nosebleed.    Severe fatigue.    A new rash.    Odd emotional or behavioral changes.    Uncontrollable body movements or \"jerkiness.\"    Severe chest pain.   Other treatment    Rest and drink plenty of water.    Choose throat-friendly liquids and foods, such as lemon tea, broth, applesauce, frozen yogurt, or popsicles.    Gargle with salt water several times a day to help with your throat pain.    Try cough drops and throat lozenges for extra relief.    Stir a teaspoon of honey into warm water or weak tea to help soothe a sore throat.    Avoid smoke and air pollution. Smoke can make infections worse.   Prevention    Avoid close contact with other people when you're sick.    Cover your mouth and nose when you cough or sneeze. Use a tissue or cough into your elbow. Make sure that used tissues go directly into the trash.    Avoid touching your eyes, nose, or mouth while you're sick.    Wash your hands often, especially after coughing, sneezing, or blowing your nose. If soap and water are not available, use an alcohol-based hand .    If you or someone in your home or workplace is sick, disinfect commonly used items. This includes door handles, tables, computers, remotes, and pens.    Coronavirus (COVID-19) information   Common symptoms of COVID-19 include fever, cough, shortness of breath, fatigue, muscle or body aches, headaches, new " loss of sense of taste or smell, sore throat, stuffy or runny nose, nausea or vomiting, and diarrhea. Most people who get COVID-19 have mild symptoms and can rest at home until they get better. Elderly people and those with chronic medical problems may be at risk for more serious complications.   FAQs about the COVID-19 vaccine   Are the vaccines safe?   Yes. Hundreds of millions of people in the [place] have already safely received COVID-19 vaccines under the most intense safety monitoring in the history of the [place].   Do I need the vaccine if I've already had COVID?   Yes. Vaccination helps protect you even if you've already had COVID.   If you had COVID-19 and had symptoms, wait to get vaccinated until you've recovered and completed your isolation period.   If you tested positive for COVID-19 but did not have symptoms, you can get vaccinated after 5 full days have passed since you had a positive test, as long as you don't develop symptoms.   How many doses of the vaccine do I need?   Visit www.cdc.gov/coronavirus/2019-ncov/vaccines/stay-up-to-date.html   to find out how to stay up to date with your COVID-19 vaccines.   I'm immunocompromised. How many doses of the vaccine do I need?   For information on how immunocompromised people can stay up to date with their COVID-19 vaccines, visit www.cdc.gov/coronavirus/2019-ncov/vaccines/recommendations/immuno.html  .   What are the common side effects of the vaccine?   A sore arm, tiredness, headache, and muscle pain may occur within two days of getting the vaccine and last a day or two. For the Moderna or Pfizer vaccines, side effects are more common after the second dose. People over the age of 55 are less likely to have side effects than younger people.   After I'm up to date on vaccines, can I still get or spread COVID?   Yes, you can still get COVID, but your disease should be milder. And your risk of serious illness, hospitalization, and complications will be much lower,  especially if you're up to date. Unfortunately, you can still spread COVID if you've been vaccinated. That's why it's important to follow isolation guidelines if you get sick or test positive.   After I'm up to date on vaccines, can I go back to normal?   You should still wear a mask indoors in public if:    It's required by laws, rules, regulations, or local guidance.    You have a weakened immune system.    Your age puts you at increased risk of severe disease.    You have a medical condition that puts you at increased risk of severe disease.    Someone in your household has a weakened immune system, is at increased risk for severe disease, or is unvaccinated.    You're in an area of high transmission.   Where can I get a COVID-19 vaccine?   Visit [facility]'s website for more information. To find a COVID-19 vaccination site near you, visit www.[place].gov/  , call [phone]  , or text your zip code to 637917 ([employer]). Message and data rates may apply.   I've had close contact with someone who has COVID. Do I need to quarantine, and if so, for how long?   For the most current answer, including a calculator to determine whether you need to stay home and for how long, visit www.cdc.gov/coronavirus/2019-ncov/your-health/isolation.html  .   I've tested positive for COVID. How long do I need to isolate?   For the latest recommendations, including a calculator to determine how long you need to stay home, visit www.cdc.gov/coronavirus/2019-ncov/your-health/isolation.html  .   What if I develop symptoms that might be from COVID?   For the latest recommendations on what to do if you're sick, including when to seek emergency care, visit www.cdc.gov/coronavirus/2019-ncov/if-you-are-sick/index.html  .    Flu vaccine information   Who should get a flu vaccine?   Everyone 6 months of age and older should get a yearly flu vaccine.   When should I get vaccinated?   You should get a flu vaccine by the end of October.  Once you're vaccinated, it takes about two weeks for antibodies to develop and protect you against the flu. That's why it's important to get vaccinated as soon as possible.   After October, is it too late to get vaccinated?   No. You should still get vaccinated. As long as the flu viruses are still in your community, flu vaccines will remain available, even into January of next year or later.   Why do I need a flu vaccine EVERY year?   Flu viruses are constantly changing, so flu vaccines are usually updated from one season to the next. Your protection from the flu vaccine also lessens over time.   Is the flu vaccine safe?   Yes. Over the last 50 years, hundreds of millions of Americans have safely received the flu vaccines.   What are the side effects of flu vaccines?   You CANNOT get the flu from a flu vaccine. Common side effects of the flu shot include soreness, redness and/or swelling where the shot was given, low grade fever, and aches. Common side effects of the nasal spray flu vaccine for adults include runny nose, headaches, sore throat, and cough. For children, side effects include wheezing, vomiting, muscle aches, and fever.   Does the flu vaccine increase your risk of getting COVID-19?   No. There is no evidence that getting a flu vaccine increases your risk of getting COVID-19.   Is it safe to get the flu vaccine along with a COVID-19 vaccine?   Yes. It's safe to get the flu vaccine with a COVID-19 vaccine or booster.   Contact your healthcare provider TODAY for details on when and where to get your flu vaccine.   Your provider   Your diagnosis was provided by SEN Ruiz, a member of your trusted care team at [facility].   If you have any questions, call us at [phone]  .   View Doctor's Note     Expires on 01/25/24

## 2024-01-05 ENCOUNTER — OFFICE VISIT (OUTPATIENT)
Dept: INTERNAL MEDICINE | Facility: CLINIC | Age: 24
End: 2024-01-05
Payer: COMMERCIAL

## 2024-01-05 VITALS
DIASTOLIC BLOOD PRESSURE: 85 MMHG | HEIGHT: 64 IN | RESPIRATION RATE: 16 BRPM | SYSTOLIC BLOOD PRESSURE: 133 MMHG | BODY MASS INDEX: 29.53 KG/M2 | TEMPERATURE: 98.7 F | OXYGEN SATURATION: 100 % | WEIGHT: 173 LBS | HEART RATE: 97 BPM

## 2024-01-05 DIAGNOSIS — G43.E09 CHRONIC MIGRAINE WITH AURA WITHOUT STATUS MIGRAINOSUS, NOT INTRACTABLE: ICD-10-CM

## 2024-01-05 DIAGNOSIS — F41.1 GENERALIZED ANXIETY DISORDER: Primary | ICD-10-CM

## 2024-01-05 PROCEDURE — 99214 OFFICE O/P EST MOD 30 MIN: CPT | Performed by: NURSE PRACTITIONER

## 2024-01-05 RX ORDER — BUPROPION HYDROCHLORIDE 150 MG/1
150 TABLET ORAL DAILY
Qty: 30 TABLET | Refills: 1 | Status: SHIPPED | OUTPATIENT
Start: 2024-01-05

## 2024-01-05 RX ORDER — ELETRIPTAN HYDROBROMIDE 20 MG/1
20 TABLET, FILM COATED ORAL DAILY PRN
Qty: 20 TABLET | Refills: 1 | Status: SHIPPED | OUTPATIENT
Start: 2024-01-05

## 2024-01-05 RX ORDER — VENLAFAXINE HYDROCHLORIDE 37.5 MG/1
37.5 CAPSULE, EXTENDED RELEASE ORAL EVERY OTHER DAY
Qty: 16 CAPSULE | Refills: 0 | Status: SHIPPED | OUTPATIENT
Start: 2024-01-05 | End: 2024-01-08 | Stop reason: SDUPTHER

## 2024-01-05 NOTE — PROGRESS NOTES
Subjective     Chief Complaint   Patient presents with    Anxiety       Anxiety        Sisi Gale presents to the clinic today for a follow-up visit to discuss anxiety and chronic migraines.    Ms. Gale has been tapering off of Effexor 37.5 mg because she was previously taking Elavil, Zoloft, and Effexor. Initially, she was taking 75 mg of Effexor every other day. She has been experiencing worsening neck pain and describes feeling a knot in her neck that radiates upward. The patient has been under the care of orthopedist, Dr. Cross. She has had MRI and CT scan imaging obtained. She is currently under the care of a chiropractor. Her most recent visit with Dr. Cross was in 08/2023 or 09/2023. She was previously receiving injections at [facility], but Dr. Cross recommended her to receive them with pain management. She was supposed to establish care with Dr. Mancia, but she has been unable to schedule a visit. At times, she experiences numbness in her hand, and she has had nerve conduction testing performed. Within the last 2 weeks, her numbness and pain have worsened. She is able to achieve some relief with Advil.    Approximately 6 months ago, the patient was prescribed Ubrelvy, but this did not do much for her. She was transitioned to rizatriptan, but her insurance only covers 6 tablets every 31 days. She has filled 2 prescriptions of the medication and it was somewhat helpful. The patient has been without the medication for approximately 2 weeks. She reports that Fioricet is the most effective medication in relieving her headaches, and she has been taking this every other day. She feels that she would be able to manage without taking Fioricet if she were able to obtain more than 6 tablets of Relpax. On New Year's, she was laying in bed because movement and light were making her nauseous. The patient reports that there are instances where half of her face will swell due to her pain and this caused  her to leave work one day. She was previously under the care of the headache clinic, but she did not have any additional imaging obtained. The patient received Botox injections in 03/2023, but these were only effective for 1 week and the cost of the injections were $2000. She was supposed to return for more injections in 09/2023 or 10/2023, but she was advised against it.    Patient's PMR from outside medical facility reviewed and noted.    Review of Systems   Musculoskeletal:  Positive for neck pain.   Neurological:  Positive for numbness and headaches.      Otherwise complete ROS reviewed and negative except as mentioned in the HPI.    Past Medical History:   Past Medical History:   Diagnosis Date    Allergic     Anxiety      Past Surgical History:  Past Surgical History:   Procedure Laterality Date    WISDOM TOOTH EXTRACTION       Social History:  reports that she has never smoked. She has never been exposed to tobacco smoke. She has never used smokeless tobacco. She reports that she does not drink alcohol and does not use drugs.    Family History: family history includes No Known Problems in her father and mother.       Allergies:  Allergies   Allergen Reactions    Bactrim [Sulfamethoxazole-Trimethoprim] Hives    Biaxin [Clarithromycin] Hives    Cephalosporins Hives    Sulfa Antibiotics Hives     Medications:  Prior to Admission medications    Medication Sig Start Date End Date Taking? Authorizing Provider   amitriptyline (ELAVIL) 75 MG tablet Take 1 tablet by mouth every night at bedtime.    Provider, Historical, MD   butalbital-acetaminophen-caffeine-codeine (FIORICET WITH CODEINE) -47-30 MG per capsule Take 1 capsule by mouth. 12/4/23   ProviderMary MD Butalbital-APAP-Caff-Cod -54-30 MG capsule Take 1 capsule by mouth 3 times a day. 10/19/23   Mary Sharp MD   eletriptan (Relpax) 20 MG tablet Take 1 tablet by mouth Daily As Needed for Migraine. may repeat in 2 hours if  "necessary 11/8/23   Dex Del Rio APRN   Galcanezumab-gnlm 120 MG/ML solution prefilled syringe Inject 1 mL under the skin into the appropriate area as directed Every 30 (Thirty) Days. 11/20/23   Dex Del Rio APRN   ondansetron ODT (ZOFRAN-ODT) 4 MG disintegrating tablet Place 1 tablet on the tongue Every 6 (Six) Hours As Needed for Nausea or Vomiting. 11/17/23   Dom Henderson APRN   oxyCODONE-acetaminophen (PERCOCET) 5-325 MG per tablet Take 1 tablet by mouth Every 6 (Six) Hours As Needed for Severe Pain. 11/17/23   Philippe Christianson MD   sertraline (ZOLOFT) 100 MG tablet Take 1 tablet by mouth Daily for 90 days. 11/15/23 2/13/24  Dex Del Rio APRN   Rosy 3-0.03 MG per tablet Take 1 tablet by mouth Daily. 11/7/23   Mary Sharp MD   tamsulosin (FLOMAX) 0.4 MG capsule 24 hr capsule Take 1 capsule by mouth Every Night. 12/19/17   Reynaldo Antoine MD   tiZANidine (ZANAFLEX) 4 MG tablet Take 0.5-1 tablets by mouth Every 8 (Eight) Hours As Needed.    ProviderMary MD   ubrogepant (Ubrelvy) 100 MG tablet Take 1 tablet by mouth Daily As Needed (migraine). 11/8/23   Dex Del Rio APRN   venlafaxine XR (Effexor XR) 37.5 MG 24 hr capsule Take 1 capsule by mouth Daily. 12/8/23   Thalia Avilez APRN       Objective     Vital Signs: /85 (BP Location: Right arm, Patient Position: Sitting, Cuff Size: Large Adult)   Pulse 97   Temp 98.7 °F (37.1 °C) (Infrared)   Resp 16   Ht 162.6 cm (64.02\")   Wt 78.5 kg (173 lb)   SpO2 100%   BMI 29.68 kg/m²   Physical Exam  Vitals reviewed.   Constitutional:       Appearance: She is well-developed.   HENT:      Head: Normocephalic and atraumatic.   Eyes:      Pupils: Pupils are equal, round, and reactive to light.   Neck:      Vascular: No JVD.   Cardiovascular:      Rate and Rhythm: Normal rate and regular rhythm.   Pulmonary:      Effort: Pulmonary effort is normal.   Abdominal:      General: Bowel sounds are normal.      " Palpations: Abdomen is soft.   Musculoskeletal:         General: No deformity.      Cervical back: Normal range of motion and neck supple.   Lymphadenopathy:      Cervical: No cervical adenopathy.   Skin:     General: Skin is warm and dry.   Neurological:      Mental Status: She is alert and oriented to person, place, and time.   Psychiatric:         Behavior: Behavior normal.         Thought Content: Thought content normal.         Judgment: Judgment normal.       Results Reviewed:  Glucose   Date Value Ref Range Status   12/08/2023 81 70 - 99 mg/dL Final   11/17/2023 94 65 - 99 mg/dL Final   05/06/2022 83 74 - 109 mg/dL Final     BUN   Date Value Ref Range Status   12/08/2023 12 6 - 20 mg/dL Final   11/17/2023 10 6 - 20 mg/dL Final   05/06/2022 9 6 - 20 mg/dL Final     Creatinine   Date Value Ref Range Status   12/08/2023 0.77 0.57 - 1.00 mg/dL Final   12/05/2023 0.70 0.60 - 1.30 mg/dL Final     Comment:     Serial Number: [id number]:  [id number]   05/06/2022 0.6 0.5 - 0.9 mg/dL Final     Sodium   Date Value Ref Range Status   12/08/2023 141 134 - 144 mmol/L Final   11/17/2023 138 136 - 145 mmol/L Final   05/06/2022 142 136 - 145 mmol/L Final     Potassium   Date Value Ref Range Status   12/08/2023 4.6 3.5 - 5.2 mmol/L Final   11/17/2023 3.8 3.5 - 5.2 mmol/L Final     Comment:     Slight hemolysis detected by analyzer. Result may be falsely elevated.   05/06/2022 4.2 3.5 - 5.0 mmol/L Final     Chloride   Date Value Ref Range Status   12/08/2023 103 96 - 106 mmol/L Final   11/17/2023 103 98 - 107 mmol/L Final   05/06/2022 103 98 - 111 mmol/L Final     CO2   Date Value Ref Range Status   11/17/2023 25.0 22.0 - 29.0 mmol/L Final   05/06/2022 21 (L) 22 - 29 mmol/L Final     Total CO2   Date Value Ref Range Status   12/08/2023 20 20 - 29 mmol/L Final     Calcium   Date Value Ref Range Status   12/08/2023 10.0 8.7 - 10.2 mg/dL Final   11/17/2023 9.0 8.6 - 10.5 mg/dL Final   05/06/2022 10.0 8.6 - 10.0 mg/dL Final     ALT  (SGPT)   Date Value Ref Range Status   12/08/2023 16 0 - 32 IU/L Final   11/17/2023 15 1 - 33 U/L Final   05/06/2022 21 5 - 33 U/L Final     AST (SGOT)   Date Value Ref Range Status   12/08/2023 15 0 - 40 IU/L Final   11/17/2023 18 1 - 32 U/L Final   05/06/2022 20 5 - 32 U/L Final     WBC   Date Value Ref Range Status   12/08/2023 5.1 3.4 - 10.8 x10E3/uL Final   05/06/2022 10.5 4.8 - 10.8 K/uL Final     Hematocrit   Date Value Ref Range Status   12/08/2023 40.8 34.0 - 46.6 % Final   11/17/2023 38.4 34.0 - 46.6 % Final   05/06/2022 42.0 37.0 - 47.0 % Final     Platelets   Date Value Ref Range Status   12/08/2023 254 150 - 450 x10E3/uL Final   11/17/2023 297 140 - 450 10*3/mm3 Final   05/06/2022 353 130 - 400 K/uL Final     Triglycerides   Date Value Ref Range Status   12/08/2023 129 0 - 149 mg/dL Final     HDL Cholesterol   Date Value Ref Range Status   12/08/2023 80 >39 mg/dL Final     LDL Chol Calc (NIH)   Date Value Ref Range Status   12/08/2023 93 0 - 99 mg/dL Final     Hemoglobin A1C   Date Value Ref Range Status   12/08/2023 5.7 (H) 4.8 - 5.6 % Final     Comment:              Prediabetes: 5.7 - 6.4           Diabetes: >6.4           Glycemic control for adults with diabetes: <7.0     05/06/2022 5.4 4.0 - 6.0 % Final     Comment:     HbA1c levels >6% are an indication of hyperglycemia during the preceding 2  to 3 months or longer.    HbA1c levels may reach 20% or higher in poorly controlled diabetes.  Therapeutic action is suggested at levels above 8%.    Diabetes patients with HbA1c levels below 7% meet the goal of the American  Diabetes Association.    HbA1c levels below the established reference range may indicate recent  episodes of hypoglycemia, the presence of Hb variants, or shortened lifetime  of erythrocytes.          Assessment / Plan     Assessment/Plan:  Diagnoses and all orders for this visit:    1. Generalized anxiety disorder (Primary)  -     buPROPion XL (Wellbutrin XL) 150 MG 24 hr tablet; Take  1 tablet by mouth Daily.  Dispense: 30 tablet; Refill: 1  -     venlafaxine XR (Effexor XR) 37.5 MG 24 hr capsule; Take 1 capsule by mouth Every Other Day.  Dispense: 16 capsule; Refill: 0    2. Chronic migraine with aura without status migrainosus, not intractable  -     eletriptan (Relpax) 20 MG tablet; Take 1 tablet by mouth Daily As Needed for Migraine. may repeat in 2 hours if necessary  Dispense: 20 tablet; Refill: 1      Chronic migraine with aura without status migrainosus, not intractable  Relpax 20 mg has been prescribed for the patient.    Generalized anxiety disorder  The patient was advised to take Effexor 37.5 mg every other day until her supply is finished. She will start Wellbutrin  mg daily.    Return in about 1 month (around 2/5/2024). unless patient needs to be seen sooner or acute issues arise.    Code Status: Full.     I have discussed the patient results/orders and and plan/recommendation with them at today's visit.      Signed by:    SEN Perez Date: 01/05/24    Transcribed from ambient dictation for SEN Perez by Thalia Ashraf.  01/05/24   17:23 CST    Patient or patient representative verbalized consent to the visit recording.  I have personally performed the services described in this document as transcribed by the above individual, and it is both accurate and complete.  SEN Perez  1/5/2024  18:03 CST

## 2024-01-08 ENCOUNTER — OFFICE VISIT (OUTPATIENT)
Dept: INTERNAL MEDICINE | Facility: CLINIC | Age: 24
End: 2024-01-08
Payer: COMMERCIAL

## 2024-01-08 VITALS
RESPIRATION RATE: 16 BRPM | HEIGHT: 64 IN | DIASTOLIC BLOOD PRESSURE: 86 MMHG | TEMPERATURE: 97.7 F | SYSTOLIC BLOOD PRESSURE: 119 MMHG | WEIGHT: 174 LBS | HEART RATE: 91 BPM | OXYGEN SATURATION: 98 % | BODY MASS INDEX: 29.71 KG/M2

## 2024-01-08 DIAGNOSIS — G43.E09 CHRONIC MIGRAINE WITH AURA WITHOUT STATUS MIGRAINOSUS, NOT INTRACTABLE: ICD-10-CM

## 2024-01-08 DIAGNOSIS — J01.40 SUBACUTE PANSINUSITIS: Primary | ICD-10-CM

## 2024-01-08 DIAGNOSIS — F41.1 GENERALIZED ANXIETY DISORDER: ICD-10-CM

## 2024-01-08 RX ORDER — DOXYCYCLINE HYCLATE 100 MG/1
100 CAPSULE ORAL 2 TIMES DAILY
Qty: 14 CAPSULE | Refills: 0 | Status: SHIPPED | OUTPATIENT
Start: 2024-01-08 | End: 2024-01-15

## 2024-01-08 RX ORDER — METHYLPREDNISOLONE SODIUM SUCCINATE 125 MG/2ML
80 INJECTION, POWDER, LYOPHILIZED, FOR SOLUTION INTRAMUSCULAR; INTRAVENOUS ONCE
Status: COMPLETED | OUTPATIENT
Start: 2024-01-08 | End: 2024-01-08

## 2024-01-08 RX ORDER — VENLAFAXINE HYDROCHLORIDE 37.5 MG/1
37.5 CAPSULE, EXTENDED RELEASE ORAL EVERY OTHER DAY
Qty: 16 CAPSULE | Refills: 0 | Status: SHIPPED | OUTPATIENT
Start: 2024-01-08

## 2024-01-08 RX ORDER — AZELASTINE 1 MG/ML
2 SPRAY, METERED NASAL 2 TIMES DAILY
Qty: 30 ML | Refills: 5 | Status: SHIPPED | OUTPATIENT
Start: 2024-01-08

## 2024-01-08 RX ADMIN — METHYLPREDNISOLONE SODIUM SUCCINATE 80 MG: 125 INJECTION, POWDER, LYOPHILIZED, FOR SOLUTION INTRAMUSCULAR; INTRAVENOUS at 16:31

## 2024-01-08 NOTE — PROGRESS NOTES
"Chief Complaint  Cough, Nasal Congestion, and Sore Throat (Recurring sore throat, just finished amoxicillin.  Been on several rounds of antibiotics/)    Subjective        Sisi Gale presents to [facility]  History of Present Illness  Patient is a 23-year-old female presenting today with complaints of sore throat, sinus congestion, earache. These have been ongoing complaints with multiple antibiotics over the past few months. Reports just finished amoxicillin last Wednesday. She was starting feel better but not fully with symptoms returning once finished after a day. Has been using OTC nasal spray with minimal relief.     Continues to report migraines with them fluctuating. 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     She is currently weaning from effexor as instructed by Thalia CHATTERJEE without issue.     Past Medical History:   Diagnosis Date    Allergic     Anxiety      Past Surgical History:   Procedure Laterality Date    WISDOM TOOTH EXTRACTION       Social History     Socioeconomic History    Marital status: Single   Tobacco Use    Smoking status: Never     Passive exposure: Never    Smokeless tobacco: Never   Substance and Sexual Activity    Alcohol use: No    Drug use: Never    Sexual activity: Not Currently     Partners: Male     Birth control/protection: Condom, Birth control pill     Family History   Problem Relation Age of Onset    No Known Problems Father     No Known Problems Mother        Review of Systems   HENT:  Positive for congestion, ear pain and sore throat.    Review of systems is negative unless otherwise specified.      Objective   Vital Signs:  /86 (BP Location: Left arm, Patient Position: Sitting, Cuff Size: Adult)   Pulse 91   Temp 97.7 °F (36.5 °C) (Infrared)   Resp 16   Ht 162.6 cm (64\")   Wt 78.9 kg (174 lb)   SpO2 98%   BMI 29.87 kg/m²   Estimated body mass " "index is 29.87 kg/m² as calculated from the following:    Height as of this encounter: 162.6 cm (64\").    Weight as of this encounter: 78.9 kg (174 lb).         Physical Exam  Vitals and nursing note reviewed.   Constitutional:       General: She is not in acute distress.     Appearance: Normal appearance. She is not ill-appearing.   HENT:      Head: Normocephalic.      Right Ear: Tympanic membrane normal.      Left Ear: Tympanic membrane normal.      Nose: Nose normal. Congestion present.      Right Sinus: Maxillary sinus tenderness and frontal sinus tenderness present.      Left Sinus: Maxillary sinus tenderness and frontal sinus tenderness present.      Mouth/Throat:      Mouth: Mucous membranes are moist.      Pharynx: Oropharynx is clear. Posterior oropharyngeal erythema present.   Eyes:      Pupils: Pupils are equal, round, and reactive to light.   Cardiovascular:      Rate and Rhythm: Normal rate and regular rhythm.      Pulses: Normal pulses.      Heart sounds: Normal heart sounds.   Pulmonary:      Effort: Pulmonary effort is normal. No respiratory distress.      Breath sounds: Normal breath sounds.   Abdominal:      General: Bowel sounds are normal.      Palpations: Abdomen is soft.   Musculoskeletal:         General: Normal range of motion.      Cervical back: Normal range of motion.   Skin:     General: Skin is warm and dry.      Capillary Refill: Capillary refill takes less than 2 seconds.   Neurological:      General: No focal deficit present.      Mental Status: She is alert.            Result Review :  The following data was reviewed by: SEN Smith on 01/08/2024:  CMP          11/17/2023    17:27 12/5/2023    09:47 12/8/2023    08:16   CMP   Glucose 94   81    BUN 10   12    Creatinine 0.63  0.70  0.77    EGFR 127.2      Sodium 138   141    Potassium 3.8   4.6    Chloride 103   103    Calcium 9.0   10.0    Total Protein   7.1    Total Protein 7.0      Albumin 4.2   4.3    Globulin   2.8  "   Globulin 2.8      Total Bilirubin <0.2   0.2    Alkaline Phosphatase 103   110    AST (SGOT) 18   15    ALT (SGPT) 15   16    Albumin/Globulin Ratio 1.5      BUN/Creatinine Ratio 15.9   16    Anion Gap 10.0        CBC          11/17/2023    17:27 12/8/2023    08:16   CBC   WBC 8.24  5.1    RBC 4.28  4.67    Hemoglobin 12.1  13.5    Hematocrit 38.4  40.8    MCV 89.7  87    MCH 28.3  28.9    MCHC 31.5  33.1    RDW 13.6  12.9    Platelets 297  254      TSH          12/8/2023    08:16   TSH   TSH 1.700      Most Recent A1C          12/8/2023    08:16   HGBA1C Most Recent   Hemoglobin A1C 5.7               Assessment and Plan   Diagnoses and all orders for this visit:    1. Subacute pansinusitis (Primary)  -     methylPREDNISolone sodium succinate (SOLU-Medrol) injection 80 mg  -     azelastine (ASTELIN) 0.1 % nasal spray; 2 sprays into the nostril(s) as directed by provider 2 (Two) Times a Day. Use in each nostril as directed  Dispense: 30 mL; Refill: 5  -     doxycycline (VIBRAMYCIN) 100 MG capsule; Take 1 capsule by mouth 2 (Two) Times a Day for 7 days.  Dispense: 14 capsule; Refill: 0    2. Chronic migraine with aura without status migrainosus, not intractable    3. Generalized anxiety disorder  -     venlafaxine XR (Effexor XR) 37.5 MG 24 hr capsule; Take 1 capsule by mouth Every Other Day.  Dispense: 16 capsule; Refill: 0      - Treat sinusitis with different antibiotic class as well as provide steroid injection and nasal spray for congestion.   - Continue current migraine treatment  - Will re-send effexor with reports of pharmacy not filling the most recent every other day dosing. Anxiety is currently stable.          Follow Up   Return for Next scheduled follow up.  Patient was given instructions and counseling regarding her condition or for health maintenance advice. Please see specific information pulled into the AVS if appropriate.     Signed by:    SEN Smith Date: 01/09/24

## 2024-01-30 DIAGNOSIS — J02.9 ACUTE PHARYNGITIS, UNSPECIFIED ETIOLOGY: Primary | ICD-10-CM

## 2024-01-30 RX ORDER — AZITHROMYCIN 250 MG/1
TABLET, FILM COATED ORAL
Qty: 6 TABLET | Refills: 0 | Status: SHIPPED | OUTPATIENT
Start: 2024-01-30

## 2024-02-05 ENCOUNTER — OFFICE VISIT (OUTPATIENT)
Dept: INTERNAL MEDICINE | Facility: CLINIC | Age: 24
End: 2024-02-05
Payer: COMMERCIAL

## 2024-02-05 VITALS
HEART RATE: 103 BPM | TEMPERATURE: 98.5 F | BODY MASS INDEX: 30.05 KG/M2 | WEIGHT: 176 LBS | HEIGHT: 64 IN | OXYGEN SATURATION: 99 % | SYSTOLIC BLOOD PRESSURE: 126 MMHG | DIASTOLIC BLOOD PRESSURE: 86 MMHG

## 2024-02-05 DIAGNOSIS — F41.1 GENERALIZED ANXIETY DISORDER: Primary | ICD-10-CM

## 2024-02-05 DIAGNOSIS — B37.31 VAGINAL CANDIDA: ICD-10-CM

## 2024-02-05 PROCEDURE — 99213 OFFICE O/P EST LOW 20 MIN: CPT | Performed by: NURSE PRACTITIONER

## 2024-02-05 RX ORDER — BUPROPION HYDROCHLORIDE 150 MG/1
150 TABLET ORAL DAILY
Qty: 90 TABLET | Refills: 1 | Status: SHIPPED | OUTPATIENT
Start: 2024-02-05

## 2024-02-05 RX ORDER — FLUCONAZOLE 200 MG/1
200 TABLET ORAL DAILY
Qty: 5 TABLET | Refills: 1 | Status: SHIPPED | OUTPATIENT
Start: 2024-02-05

## 2024-02-05 NOTE — PROGRESS NOTES
Subjective     Chief Complaint   Patient presents with    Anxiety       Anxiety  Patient reports no chest pain, confusion, dizziness, nausea, nervous/anxious behavior, palpitations or shortness of breath.       Patient is here to follow up on anxiety. She weaned off of Effexor over the past two months. She is now no longer taking Effexor, she is just taking the Wellbutrin. She feels like she is doing well.     Patient also states she has yeast infection. She states she is having vaginal discharge, that is white and clumpy. The discharge has no odor. It is itchy. She changed lotions within the last week. No new soaps or sexual partners.       Review of Systems   Constitutional:  Negative for activity change, appetite change, chills and fever.   HENT:  Negative for hearing loss, nosebleeds, tinnitus and trouble swallowing.    Eyes:  Negative for visual disturbance.   Respiratory:  Negative for cough, chest tightness, shortness of breath and wheezing.    Cardiovascular:  Negative for chest pain, palpitations and leg swelling.   Gastrointestinal:  Negative for abdominal distention, abdominal pain, blood in stool, constipation, diarrhea, nausea and vomiting.   Endocrine: Negative for cold intolerance, heat intolerance, polydipsia, polyphagia and polyuria.   Genitourinary:  Positive for vaginal discharge (white, clumpy). Negative for decreased urine volume, difficulty urinating, dysuria, flank pain, frequency and hematuria.        Vaginal itching   Musculoskeletal:  Negative for arthralgias, joint swelling and myalgias.   Skin:  Negative for rash.   Allergic/Immunologic: Negative for immunocompromised state.   Neurological:  Negative for dizziness, syncope, weakness, light-headedness and headaches.   Hematological:  Negative for adenopathy. Does not bruise/bleed easily.   Psychiatric/Behavioral:  Negative for confusion and sleep disturbance. The patient is not nervous/anxious.         Past Medical History:   Past  Medical History:   Diagnosis Date    Allergic     Anxiety      Past Surgical History:  Past Surgical History:   Procedure Laterality Date    WISDOM TOOTH EXTRACTION       Social History:  reports that she has never smoked. She has never been exposed to tobacco smoke. She has never used smokeless tobacco. She reports that she does not drink alcohol and does not use drugs.    Family History: family history includes No Known Problems in her father and mother.       Allergies:  Allergies   Allergen Reactions    Bactrim [Sulfamethoxazole-Trimethoprim] Hives    Biaxin [Clarithromycin] Hives    Cephalosporins Hives    Sulfa Antibiotics Hives     Medications:  Prior to Admission medications    Medication Sig Start Date End Date Taking? Authorizing Provider   amitriptyline (ELAVIL) 75 MG tablet Take 1 tablet by mouth every night at bedtime.    ProviderMary MD   azelastine (ASTELIN) 0.1 % nasal spray 2 sprays into the nostril(s) as directed by provider 2 (Two) Times a Day. Use in each nostril as directed 1/8/24   Dex Del Rio APRN   azithromycin (Zithromax Z-Tommy) 250 MG tablet Take 2 tablets by mouth on day 1, then 1 tablet daily on days 2-5 1/30/24   Dex Del Rio APRN   buPROPion XL (Wellbutrin XL) 150 MG 24 hr tablet Take 1 tablet by mouth Daily. 1/5/24   Thalia Avilez APRN butalbital-acetaminophen-caffeine-codeine (FIORICET WITH CODEINE) -84-30 MG per capsule Take 1 capsule by mouth. 12/4/23   Provider, Historical, MD   Butalbital-APAP-Caff-Cod -12-30 MG capsule Take 1 capsule by mouth 3 times a day. 10/19/23   Mary Sharp MD   eletriptan (Relpax) 20 MG tablet Take 1 tablet by mouth Daily As Needed for Migraine. may repeat in 2 hours if necessary 1/5/24   Thalia Avilez APRN   Galcanezumab-gnlm 120 MG/ML solution prefilled syringe Inject 1 mL under the skin into the appropriate area as directed Every 30 (Thirty) Days. 11/20/23   Dex Del Rio APRN  "  ondansetron ODT (ZOFRAN-ODT) 4 MG disintegrating tablet Place 1 tablet on the tongue Every 6 (Six) Hours As Needed for Nausea or Vomiting. 11/17/23   Dom Henderson APRN   oxyCODONE-acetaminophen (PERCOCET) 5-325 MG per tablet Take 1 tablet by mouth Every 6 (Six) Hours As Needed for Severe Pain. 11/17/23   Philippe Christianson MD   sertraline (ZOLOFT) 100 MG tablet Take 1 tablet by mouth Daily for 90 days. 11/15/23 2/13/24  Dex Del Rio APRN   Rosy 3-0.03 MG per tablet Take 1 tablet by mouth Daily. 11/7/23   ProviderMary MD   tamsulosin (FLOMAX) 0.4 MG capsule 24 hr capsule Take 1 capsule by mouth Every Night. 12/19/17   Reynaldo Antoine MD   tiZANidine (ZANAFLEX) 4 MG tablet Take 0.5-1 tablets by mouth Every 8 (Eight) Hours As Needed.    ProviderMary MD   venlafaxine XR (Effexor XR) 37.5 MG 24 hr capsule Take 1 capsule by mouth Daily. 12/8/23   Thalia Avilez APRN   venlafaxine XR (Effexor XR) 37.5 MG 24 hr capsule Take 1 capsule by mouth Every Other Day. 1/8/24   Dex Del Rio APRN       Objective     Vital Signs: /86 (BP Location: Right arm, Patient Position: Sitting, Cuff Size: Adult)   Pulse 103   Temp 98.5 °F (36.9 °C) (Skin)   Ht 162.6 cm (64\")   Wt 79.8 kg (176 lb)   SpO2 99%   BMI 30.21 kg/m²   Physical Exam  Vitals reviewed.   Constitutional:       Appearance: She is well-developed.   HENT:      Head: Normocephalic and atraumatic.   Eyes:      Pupils: Pupils are equal, round, and reactive to light.   Neck:      Vascular: No JVD.   Cardiovascular:      Rate and Rhythm: Normal rate and regular rhythm.   Pulmonary:      Effort: Pulmonary effort is normal.   Abdominal:      General: Bowel sounds are normal.      Palpations: Abdomen is soft.   Musculoskeletal:         General: No deformity.      Cervical back: Normal range of motion and neck supple.   Lymphadenopathy:      Cervical: No cervical adenopathy.   Skin:     General: Skin is warm and dry. "   Neurological:      Mental Status: She is alert and oriented to person, place, and time.   Psychiatric:         Behavior: Behavior normal.         Thought Content: Thought content normal.         Judgment: Judgment normal.         Results Reviewed:  Glucose   Date Value Ref Range Status   12/08/2023 81 70 - 99 mg/dL Final   11/17/2023 94 65 - 99 mg/dL Final   05/06/2022 83 74 - 109 mg/dL Final     BUN   Date Value Ref Range Status   12/08/2023 12 6 - 20 mg/dL Final   11/17/2023 10 6 - 20 mg/dL Final   05/06/2022 9 6 - 20 mg/dL Final     Creatinine   Date Value Ref Range Status   12/08/2023 0.77 0.57 - 1.00 mg/dL Final   12/05/2023 0.70 0.60 - 1.30 mg/dL Final     Comment:     Serial Number: [id number]:  [id number]   05/06/2022 0.6 0.5 - 0.9 mg/dL Final     Sodium   Date Value Ref Range Status   12/08/2023 141 134 - 144 mmol/L Final   11/17/2023 138 136 - 145 mmol/L Final   05/06/2022 142 136 - 145 mmol/L Final     Potassium   Date Value Ref Range Status   12/08/2023 4.6 3.5 - 5.2 mmol/L Final   11/17/2023 3.8 3.5 - 5.2 mmol/L Final     Comment:     Slight hemolysis detected by analyzer. Result may be falsely elevated.   05/06/2022 4.2 3.5 - 5.0 mmol/L Final     Chloride   Date Value Ref Range Status   12/08/2023 103 96 - 106 mmol/L Final   11/17/2023 103 98 - 107 mmol/L Final   05/06/2022 103 98 - 111 mmol/L Final     CO2   Date Value Ref Range Status   11/17/2023 25.0 22.0 - 29.0 mmol/L Final   05/06/2022 21 (L) 22 - 29 mmol/L Final     Total CO2   Date Value Ref Range Status   12/08/2023 20 20 - 29 mmol/L Final     Calcium   Date Value Ref Range Status   12/08/2023 10.0 8.7 - 10.2 mg/dL Final   11/17/2023 9.0 8.6 - 10.5 mg/dL Final   05/06/2022 10.0 8.6 - 10.0 mg/dL Final     ALT (SGPT)   Date Value Ref Range Status   12/08/2023 16 0 - 32 IU/L Final   11/17/2023 15 1 - 33 U/L Final   05/06/2022 21 5 - 33 U/L Final     AST (SGOT)   Date Value Ref Range Status   12/08/2023 15 0 - 40 IU/L Final   11/17/2023 18 1 -  32 U/L Final   05/06/2022 20 5 - 32 U/L Final     WBC   Date Value Ref Range Status   12/08/2023 5.1 3.4 - 10.8 x10E3/uL Final   05/06/2022 10.5 4.8 - 10.8 K/uL Final     Hematocrit   Date Value Ref Range Status   12/08/2023 40.8 34.0 - 46.6 % Final   11/17/2023 38.4 34.0 - 46.6 % Final   05/06/2022 42.0 37.0 - 47.0 % Final     Platelets   Date Value Ref Range Status   12/08/2023 254 150 - 450 x10E3/uL Final   11/17/2023 297 140 - 450 10*3/mm3 Final   05/06/2022 353 130 - 400 K/uL Final     Triglycerides   Date Value Ref Range Status   12/08/2023 129 0 - 149 mg/dL Final     HDL Cholesterol   Date Value Ref Range Status   12/08/2023 80 >39 mg/dL Final     LDL Chol Calc (NIH)   Date Value Ref Range Status   12/08/2023 93 0 - 99 mg/dL Final     Hemoglobin A1C   Date Value Ref Range Status   12/08/2023 5.7 (H) 4.8 - 5.6 % Final     Comment:              Prediabetes: 5.7 - 6.4           Diabetes: >6.4           Glycemic control for adults with diabetes: <7.0     05/06/2022 5.4 4.0 - 6.0 % Final     Comment:     HbA1c levels >6% are an indication of hyperglycemia during the preceding 2  to 3 months or longer.    HbA1c levels may reach 20% or higher in poorly controlled diabetes.  Therapeutic action is suggested at levels above 8%.    Diabetes patients with HbA1c levels below 7% meet the goal of the American  Diabetes Association.    HbA1c levels below the established reference range may indicate recent  episodes of hypoglycemia, the presence of Hb variants, or shortened lifetime  of erythrocytes.          Assessment / Plan     Assessment/Plan:  Diagnoses and all orders for this visit:    1. Generalized anxiety disorder (Primary)  -     buPROPion XL (Wellbutrin XL) 150 MG 24 hr tablet; Take 1 tablet by mouth Daily.  Dispense: 90 tablet; Refill: 1    2. Vaginal candida  -     fluconazole (Diflucan) 200 MG tablet; Take 1 tablet by mouth Daily.  Dispense: 5 tablet; Refill: 1          Return in about 6 months (around  8/5/2024). unless patient needs to be seen sooner or acute issues arise.    Code Status: Full.    I have discussed the patient results/orders and and plan/recommendation with them at today's visit.      Signed by:    SEN Perez Date: 02/05/24

## 2024-02-12 DIAGNOSIS — F41.1 GAD (GENERALIZED ANXIETY DISORDER): ICD-10-CM

## 2024-02-12 DIAGNOSIS — F33.0 MILD EPISODE OF RECURRENT MAJOR DEPRESSIVE DISORDER (HCC): ICD-10-CM

## 2024-02-12 RX ORDER — SERTRALINE HYDROCHLORIDE 100 MG/1
100 TABLET, FILM COATED ORAL DAILY
Qty: 90 TABLET | OUTPATIENT
Start: 2024-02-12

## 2024-02-15 RX ORDER — METHYLPREDNISOLONE 4 MG/1
TABLET ORAL
Qty: 21 TABLET | Refills: 0 | Status: SHIPPED | OUTPATIENT
Start: 2024-02-15

## 2024-02-15 RX ORDER — BENZONATATE 100 MG/1
100 CAPSULE ORAL 3 TIMES DAILY PRN
Qty: 21 CAPSULE | Refills: 0 | Status: SHIPPED | OUTPATIENT
Start: 2024-02-15

## 2024-03-19 ENCOUNTER — CLINICAL SUPPORT (OUTPATIENT)
Dept: INTERNAL MEDICINE | Facility: CLINIC | Age: 24
End: 2024-03-19
Payer: COMMERCIAL

## 2024-03-19 DIAGNOSIS — R10.9 FLANK PAIN: Primary | ICD-10-CM

## 2024-03-19 LAB
BILIRUB BLD-MCNC: NEGATIVE MG/DL
CLARITY, POC: CLEAR
COLOR UR: YELLOW
GLUCOSE UR STRIP-MCNC: NEGATIVE MG/DL
KETONES UR QL: NEGATIVE
LEUKOCYTE EST, POC: NEGATIVE
NITRITE UR-MCNC: NEGATIVE MG/ML
PH UR: 6 [PH] (ref 5–8)
PROT UR STRIP-MCNC: NEGATIVE MG/DL
RBC # UR STRIP: ABNORMAL /UL
SP GR UR: 1.03 (ref 1–1.03)
UROBILINOGEN UR QL: ABNORMAL

## 2024-03-19 RX ORDER — NITROFURANTOIN 25; 75 MG/1; MG/1
100 CAPSULE ORAL 2 TIMES DAILY
Qty: 6 CAPSULE | Refills: 0 | Status: SHIPPED | OUTPATIENT
Start: 2024-03-19

## 2024-03-21 DIAGNOSIS — M54.12 RADICULITIS, CERVICAL: ICD-10-CM

## 2024-03-21 DIAGNOSIS — M54.2 NECK PAIN: ICD-10-CM

## 2024-03-21 DIAGNOSIS — M62.838 CERVICAL PARASPINAL MUSCLE SPASM: ICD-10-CM

## 2024-03-22 RX ORDER — TIZANIDINE 4 MG/1
TABLET ORAL
Qty: 60 TABLET | Refills: 1 | Status: SHIPPED | OUTPATIENT
Start: 2024-03-22

## 2024-03-22 NOTE — TELEPHONE ENCOUNTER
Received fax from pharmacy requesting refill on pts medication(s). Pt was last seen in office on 8/18/2023  and has a follow up scheduled for Visit date not found. Will send request to  [facility]  for authorization.     Requested Prescriptions     Pending Prescriptions Disp Refills    tiZANidine (ZANAFLEX) 4 MG tablet [Pharmacy Med Name: TIZANIDINE HCL 4 MG TABLET] 60 tablet 1     Sig: TAKE 1/2 TO 1 TABLET BY MOUTH EVERY 8 HOURS AS NEEDED

## 2024-04-02 ENCOUNTER — OFFICE VISIT (OUTPATIENT)
Dept: INTERNAL MEDICINE | Facility: CLINIC | Age: 24
End: 2024-04-02
Payer: COMMERCIAL

## 2024-04-02 VITALS
SYSTOLIC BLOOD PRESSURE: 141 MMHG | OXYGEN SATURATION: 99 % | DIASTOLIC BLOOD PRESSURE: 100 MMHG | RESPIRATION RATE: 17 BRPM | BODY MASS INDEX: 31.41 KG/M2 | HEART RATE: 105 BPM | WEIGHT: 184 LBS | TEMPERATURE: 98.4 F | HEIGHT: 64 IN

## 2024-04-02 DIAGNOSIS — K59.00 CONSTIPATION, UNSPECIFIED CONSTIPATION TYPE: ICD-10-CM

## 2024-04-02 DIAGNOSIS — R10.9 ABDOMINAL PAIN, UNSPECIFIED ABDOMINAL LOCATION: Primary | ICD-10-CM

## 2024-04-02 PROCEDURE — 99213 OFFICE O/P EST LOW 20 MIN: CPT | Performed by: NURSE PRACTITIONER

## 2024-04-02 NOTE — PROGRESS NOTES
Subjective     Chief Complaint   Patient presents with    Abdominal Pain       History of Present Illness  Patient presents today for complaint of severe constipation. She has had issues with constipation for years. She recently had surgery to remove an external hemorrhoid at [facility] with no complications. She has not had any bleeding before or after the surgery. She has had more problems having a bowel movement since that time. She reports increasing use of stool softeners and laxatives with little improvement.     Review of Systems   Constitutional:  Negative for appetite change, fatigue and fever.   HENT:  Negative for postnasal drip.    Eyes:  Negative for pain and visual disturbance.   Respiratory:  Negative for cough, chest tightness, shortness of breath and wheezing.    Cardiovascular:  Negative for chest pain, palpitations and leg swelling.   Gastrointestinal:  Positive for abdominal distention, abdominal pain and constipation. Negative for nausea and vomiting.        Patient reports abdominal distension, aching, and full feeling in her lower abdomen.   Endocrine: Negative for cold intolerance and heat intolerance.   Genitourinary:  Negative for difficulty urinating, dysuria, frequency and urgency.   Musculoskeletal:  Negative for gait problem.   Skin:  Negative for rash.   Neurological:  Negative for dizziness, weakness and headaches.   Hematological:  Does not bruise/bleed easily.   Psychiatric/Behavioral:  The patient is not nervous/anxious.           Past Medical History:   Past Medical History:   Diagnosis Date    Allergic     Anxiety      Past Surgical History:  Past Surgical History:   Procedure Laterality Date    WISDOM TOOTH EXTRACTION       Social History:  reports that she has never smoked. She has never been exposed to tobacco smoke. She has never used smokeless tobacco. She reports that she does not drink alcohol and does not use drugs.    Family History: family history  includes No Known Problems in her father and mother.      Allergies:  Allergies   Allergen Reactions    Bactrim [Sulfamethoxazole-Trimethoprim] Hives    Biaxin [Clarithromycin] Hives    Cephalosporins Hives    Sulfa Antibiotics Hives     Medications:  Prior to Admission medications    Medication Sig Start Date End Date Taking? Authorizing Provider   amitriptyline (ELAVIL) 75 MG tablet Take 1 tablet by mouth every night at bedtime.    ProviderMary MD   azelastine (ASTELIN) 0.1 % nasal spray 2 sprays into the nostril(s) as directed by provider 2 (Two) Times a Day. Use in each nostril as directed 1/8/24   Dex Del Rio APRN   azithromycin (Zithromax Z-Tommy) 250 MG tablet Take 2 tablets by mouth on day 1, then 1 tablet daily on days 2-5 1/30/24   Dex Del Rio APRN   benzonatate (Tessalon Perles) 100 MG capsule Take 1 capsule by mouth 3 (Three) Times a Day As Needed for Cough. 2/15/24   Thalia Avilez APRN   buPROPion XL (Wellbutrin XL) 150 MG 24 hr tablet Take 1 tablet by mouth Daily. 2/5/24   Thalia Avilez APRN   butalbital-acetaminophen-caffeine-codeine (FIORICET WITH CODEINE) -07-30 MG per capsule Take 1 capsule by mouth. 12/4/23   Provider, Historical, MD   Butalbital-APAP-Caff-Cod -05-30 MG capsule Take 1 capsule by mouth 3 times a day. 10/19/23   Mary Sharp MD   eletriptan (Relpax) 20 MG tablet Take 1 tablet by mouth Daily As Needed for Migraine. may repeat in 2 hours if necessary 1/5/24   Thalia Avilez APRN   fluconazole (Diflucan) 200 MG tablet Take 1 tablet by mouth Daily. 2/5/24   Thalia Avilez APRN   Galcanezumab-gnlm 120 MG/ML solution prefilled syringe Inject 1 mL under the skin into the appropriate area as directed Every 30 (Thirty) Days. 11/20/23   Dex Del Rio APRN   methylPREDNISolone (MEDROL) 4 MG dose pack Take as directed on package instructions. 2/15/24   Avilez, SEN Soto   nitrofurantoin,  "macrocrystal-monohydrate, (Macrobid) 100 MG capsule Take 1 capsule by mouth 2 (Two) Times a Day. 3/19/24   Thalia Avilez APRN   ondansetron ODT (ZOFRAN-ODT) 4 MG disintegrating tablet Place 1 tablet on the tongue Every 6 (Six) Hours As Needed for Nausea or Vomiting. 11/17/23   Dom Henderson APRN   oxyCODONE-acetaminophen (PERCOCET) 5-325 MG per tablet Take 1 tablet by mouth Every 6 (Six) Hours As Needed for Severe Pain. 11/17/23   Philippe Christianson MD   sertraline (ZOLOFT) 100 MG tablet Take 1 tablet by mouth Daily for 90 days. 11/15/23 2/13/24  Dex Del Rio APRN   Rosy 3-0.03 MG per tablet Take 1 tablet by mouth Daily. 11/7/23   ProviderMary MD   tamsulosin (FLOMAX) 0.4 MG capsule 24 hr capsule Take 1 capsule by mouth Every Night. 12/19/17   Reynaldo Antoine MD   tiZANidine (ZANAFLEX) 4 MG tablet Take 0.5-1 tablets by mouth Every 8 (Eight) Hours As Needed.    Provider, MD Mary       Objective     Vital Signs: /100   Pulse 105   Temp 98.4 °F (36.9 °C)   Resp 17   Ht 162.6 cm (64\")   Wt 83.5 kg (184 lb)   SpO2 99%   BMI 31.58 kg/m²   Physical Exam  Constitutional:       Appearance: She is well-developed.   HENT:      Head: Normocephalic and atraumatic.   Eyes:      Pupils: Pupils are equal, round, and reactive to light.   Cardiovascular:      Rate and Rhythm: Regular rhythm. Tachycardia present.      Heart sounds: Normal heart sounds.   Pulmonary:      Effort: Pulmonary effort is normal.      Breath sounds: Normal breath sounds.   Abdominal:      General: Bowel sounds are decreased.      Palpations: Abdomen is soft.      Tenderness: There is abdominal tenderness in the right lower quadrant and left lower quadrant. There is no guarding or rebound.      Comments: Patient has some lower abdominal distention.   She has slight tenderness to palpation of lower quadrants but no pain.   Bowel sounds are decreased in all 4 quadrants.    Musculoskeletal:         " General: Normal range of motion.      Cervical back: Normal range of motion.      Comments: No cyanosis or clubbing   Skin:     General: Skin is warm and dry.   Neurological:      Mental Status: She is alert and oriented to person, place, and time.      Motor: Motor function is intact.      Coordination: Coordination is intact.      Gait: Gait is intact.   Psychiatric:         Attention and Perception: Attention normal.         Mood and Affect: Mood is anxious.         Speech: Speech normal.         Behavior: Behavior normal. Behavior is cooperative.         Thought Content: Thought content normal.         Cognition and Memory: Cognition normal.         Judgment: Judgment normal.       Results Reviewed:  Glucose   Date Value Ref Range Status   12/08/2023 81 70 - 99 mg/dL Final   11/17/2023 94 65 - 99 mg/dL Final   05/06/2022 83 74 - 109 mg/dL Final     BUN   Date Value Ref Range Status   12/08/2023 12 6 - 20 mg/dL Final   11/17/2023 10 6 - 20 mg/dL Final   05/06/2022 9 6 - 20 mg/dL Final     Creatinine   Date Value Ref Range Status   12/08/2023 0.77 0.57 - 1.00 mg/dL Final   12/05/2023 0.70 0.60 - 1.30 mg/dL Final     Comment:     Serial Number: [id number]:  [id number]   05/06/2022 0.6 0.5 - 0.9 mg/dL Final     Sodium   Date Value Ref Range Status   12/08/2023 141 134 - 144 mmol/L Final   11/17/2023 138 136 - 145 mmol/L Final   05/06/2022 142 136 - 145 mmol/L Final     Potassium   Date Value Ref Range Status   12/08/2023 4.6 3.5 - 5.2 mmol/L Final   11/17/2023 3.8 3.5 - 5.2 mmol/L Final     Comment:     Slight hemolysis detected by analyzer. Result may be falsely elevated.   05/06/2022 4.2 3.5 - 5.0 mmol/L Final     Chloride   Date Value Ref Range Status   12/08/2023 103 96 - 106 mmol/L Final   11/17/2023 103 98 - 107 mmol/L Final   05/06/2022 103 98 - 111 mmol/L Final     CO2   Date Value Ref Range Status   11/17/2023 25.0 22.0 - 29.0 mmol/L Final   05/06/2022 21 (L) 22 - 29 mmol/L Final     Total CO2   Date Value  Ref Range Status   12/08/2023 20 20 - 29 mmol/L Final     Calcium   Date Value Ref Range Status   12/08/2023 10.0 8.7 - 10.2 mg/dL Final   11/17/2023 9.0 8.6 - 10.5 mg/dL Final   05/06/2022 10.0 8.6 - 10.0 mg/dL Final     ALT (SGPT)   Date Value Ref Range Status   12/08/2023 16 0 - 32 IU/L Final   11/17/2023 15 1 - 33 U/L Final   05/06/2022 21 5 - 33 U/L Final     AST (SGOT)   Date Value Ref Range Status   12/08/2023 15 0 - 40 IU/L Final   11/17/2023 18 1 - 32 U/L Final   05/06/2022 20 5 - 32 U/L Final     WBC   Date Value Ref Range Status   12/08/2023 5.1 3.4 - 10.8 x10E3/uL Final   05/06/2022 10.5 4.8 - 10.8 K/uL Final     Hematocrit   Date Value Ref Range Status   12/08/2023 40.8 34.0 - 46.6 % Final   11/17/2023 38.4 34.0 - 46.6 % Final   05/06/2022 42.0 37.0 - 47.0 % Final     Platelets   Date Value Ref Range Status   12/08/2023 254 150 - 450 x10E3/uL Final   11/17/2023 297 140 - 450 10*3/mm3 Final   05/06/2022 353 130 - 400 K/uL Final     Triglycerides   Date Value Ref Range Status   12/08/2023 129 0 - 149 mg/dL Final     HDL Cholesterol   Date Value Ref Range Status   12/08/2023 80 >39 mg/dL Final     LDL Chol Calc (NIH)   Date Value Ref Range Status   12/08/2023 93 0 - 99 mg/dL Final     Hemoglobin A1C   Date Value Ref Range Status   12/08/2023 5.7 (H) 4.8 - 5.6 % Final     Comment:              Prediabetes: 5.7 - 6.4           Diabetes: >6.4           Glycemic control for adults with diabetes: <7.0     05/06/2022 5.4 4.0 - 6.0 % Final     Comment:     HbA1c levels >6% are an indication of hyperglycemia during the preceding 2  to 3 months or longer.    HbA1c levels may reach 20% or higher in poorly controlled diabetes.  Therapeutic action is suggested at levels above 8%.    Diabetes patients with HbA1c levels below 7% meet the goal of the American  Diabetes Association.    HbA1c levels below the established reference range may indicate recent  episodes of hypoglycemia, the presence of Hb variants, or  shortened lifetime  of erythrocytes.          Assessment / Plan     Assessment/Plan:  Diagnoses and all orders for this visit:    1. Abdominal pain, unspecified abdominal location (Primary)  -     Comprehensive Metabolic Panel  -     CBC & Differential  -     TSH  -     XR Abdomen KUB (In Office); Future    2. Constipation, unspecified constipation type  -     XR Abdomen KUB (In Office); Future      Will call with KUB results when available and provide recommendations at that time.  Discussed increasing her water intake as well as eating more fruits, vegetables, and fiber.  She was also encouraged to reduce/eliminate the use of energy drinks at this time.     Code Status: Full    I have discussed the patient results/orders and and plan/recommendation with them at today's visit.      Signed by:    SEN Perez Date: 04/02/24

## 2024-04-03 ENCOUNTER — TELEPHONE (OUTPATIENT)
Dept: INTERNAL MEDICINE | Facility: CLINIC | Age: 24
End: 2024-04-03
Payer: COMMERCIAL

## 2024-04-03 NOTE — TELEPHONE ENCOUNTER
Returned call with recommendation from Thalia CHATTERJEE to take full dose of Mirlax in 8 oz of gatorade every hour until bowel movement. Patient voiced understanding with no further questions. She reports being off of work the rest of the week.

## 2024-04-03 NOTE — TELEPHONE ENCOUNTER
Called and reviewed XR results. Discussed stool being throughout ascending, transverse, and descending colon. Instructed on use of Miralax and Fleets enema. Will reach out to PCP Thalia CHATTERJEE concerning her plan for constipation as patient reports her having different thoughts depending on location of stool. Patient voiced understanding with no further questions.

## 2024-04-04 LAB
ALBUMIN SERPL-MCNC: 4.3 G/DL (ref 4–5)
ALBUMIN/GLOB SERPL: 1.6 {RATIO} (ref 1.2–2.2)
ALP SERPL-CCNC: 104 IU/L (ref 44–121)
ALT SERPL-CCNC: 17 IU/L (ref 0–32)
AST SERPL-CCNC: 16 IU/L (ref 0–40)
BASOPHILS # BLD AUTO: 0 X10E3/UL (ref 0–0.2)
BASOPHILS NFR BLD AUTO: 0 %
BILIRUB SERPL-MCNC: <0.2 MG/DL (ref 0–1.2)
BUN SERPL-MCNC: 8 MG/DL (ref 6–20)
BUN/CREAT SERPL: 11 (ref 9–23)
CALCIUM SERPL-MCNC: 9.5 MG/DL (ref 8.7–10.2)
CHLORIDE SERPL-SCNC: 100 MMOL/L (ref 96–106)
CO2 SERPL-SCNC: 22 MMOL/L (ref 20–29)
CREAT SERPL-MCNC: 0.71 MG/DL (ref 0.57–1)
EGFRCR SERPLBLD CKD-EPI 2021: 121 ML/MIN/1.73
EOSINOPHIL # BLD AUTO: 0 X10E3/UL (ref 0–0.4)
EOSINOPHIL NFR BLD AUTO: 0 %
ERYTHROCYTE [DISTWIDTH] IN BLOOD BY AUTOMATED COUNT: 13.5 % (ref 11.7–15.4)
GLOBULIN SER CALC-MCNC: 2.7 G/DL (ref 1.5–4.5)
GLUCOSE SERPL-MCNC: 95 MG/DL (ref 70–99)
HCT VFR BLD AUTO: 41.5 % (ref 34–46.6)
HGB BLD-MCNC: 12.7 G/DL (ref 11.1–15.9)
IMM GRANULOCYTES # BLD AUTO: 0 X10E3/UL (ref 0–0.1)
IMM GRANULOCYTES NFR BLD AUTO: 0 %
LYMPHOCYTES # BLD AUTO: 2.4 X10E3/UL (ref 0.7–3.1)
LYMPHOCYTES NFR BLD AUTO: 36 %
MCH RBC QN AUTO: 28 PG (ref 26.6–33)
MCHC RBC AUTO-ENTMCNC: 30.6 G/DL (ref 31.5–35.7)
MCV RBC AUTO: 91 FL (ref 79–97)
MONOCYTES # BLD AUTO: 0.3 X10E3/UL (ref 0.1–0.9)
MONOCYTES NFR BLD AUTO: 5 %
NEUTROPHILS # BLD AUTO: 4 X10E3/UL (ref 1.4–7)
NEUTROPHILS NFR BLD AUTO: 59 %
PLATELET # BLD AUTO: 284 X10E3/UL (ref 150–450)
POTASSIUM SERPL-SCNC: 4.1 MMOL/L (ref 3.5–5.2)
PROT SERPL-MCNC: 7 G/DL (ref 6–8.5)
RBC # BLD AUTO: 4.54 X10E6/UL (ref 3.77–5.28)
SODIUM SERPL-SCNC: 139 MMOL/L (ref 134–144)
TSH SERPL DL<=0.005 MIU/L-ACNC: 2.14 UIU/ML (ref 0.45–4.5)
WBC # BLD AUTO: 6.8 X10E3/UL (ref 3.4–10.8)

## 2024-04-06 ENCOUNTER — E-VISIT (OUTPATIENT)
Dept: FAMILY MEDICINE CLINIC | Facility: TELEHEALTH | Age: 24
End: 2024-04-06

## 2024-04-06 PROCEDURE — BRIGHTMDVISIT: Performed by: NURSE PRACTITIONER

## 2024-04-06 NOTE — EXTERNAL PATIENT INSTRUCTIONS
Note   Drink plenty of water Over the counter pain relievers okay If symptoms do not improve in 3-5 days follow up with your primary care provider or urgent care   Diagnosis   Strep pharyngitis (strep throat)   My name is SEN Sandoval, and I'm a healthcare provider at [facility]. I reviewed your interview, and I see that you have strep throat. This can be a painful condition, but it responds well to treatment.   To prevent the spread of illness to others, stay home and away from other people as much as possible while you're sick. When you need to be around other people, consider wearing a face mask.   Medications   Your pharmacy   [facility]/pharmacy #[id number] [address] [address] [phone]     Prescription   Amoxicillin (500mg): Take 1 capsule by mouth twice a day for 10 days for infection. This medication is an antibiotic. Take it exactly as directed. You must finish the entire course of medication, even if you feel better after the first few days of treatment.    Start taking the antibiotics I've prescribed right away. You need to finish the entire course of antibiotics, even if you start to feel better before the pills run out.    Some people develop a yeast infection after taking antibiotics. If you get a yeast infection, you can treat it with antifungal creams or suppositories. These are available without a prescription at [facility] and many supermarkets.   About your diagnosis   Strep throat is an infection in the throat and tonsils caused by group A streptococcus bacteria. Strep throat is most often spread by droplets that are released into the air after an infected person coughs or sneezes. You can also get strep throat by sharing cups or utensils with an infected person.   Symptoms usually begin within 2 to 5 days after a person has been exposed. The pain from strep throat usually starts quickly and can be severe, especially when swallowing. Body aches and pains are common.    "What to expect   If you follow this treatment plan, you should start to feel better within a few days.   When to seek care   Call us at [phone]   with any sudden or unexpected symptoms.    Symptoms that don't improve within 48 hours of starting antibiotics.    Symptoms that get better for a few days and then suddenly get worse.    Worsening fever.    Your throat pain becomes worse and makes swallowing extremely difficult or impossible.    Muffled voice (it may sound like you are talking with a mouthful of food).    Difficulty opening your mouth or jaw.    Stiff neck.    Joint pain, or swelling that moves from joint to joint.    Severe stomach pain.    Shortness of breath.    Nosebleed.    Severe fatigue.    A new rash.    Odd emotional or behavioral changes.    Uncontrollable body movements or \"jerkiness.\"    Severe chest pain.   Other treatment    Rest and drink plenty of water.    Choose throat-friendly liquids and foods, such as lemon tea, broth, applesauce, frozen yogurt, or popsicles.    Gargle with salt water several times a day to help with your throat pain.    Try cough drops and throat lozenges for extra relief.    Stir a teaspoon of honey into warm water or weak tea to help soothe a sore throat.    Avoid smoke and air pollution. Smoke can make infections worse.   Prevention    Avoid close contact with other people when you're sick.    Cover your mouth and nose when you cough or sneeze. Use a tissue or cough into your elbow. Make sure that used tissues go directly into the trash.    Avoid touching your eyes, nose, or mouth while you're sick.    Wash your hands often, especially after coughing, sneezing, or blowing your nose. If soap and water are not available, use an alcohol-based hand .    If you or someone in your home or workplace is sick, disinfect commonly used items. This includes door handles, tables, computers, remotes, and pens.    Coronavirus (COVID-19) information   Common " symptoms of COVID-19 include fever, cough, shortness of breath, fatigue, muscle or body aches, headaches, new loss of sense of taste or smell, sore throat, stuffy or runny nose, nausea or vomiting, and diarrhea. Most people who get COVID-19 have mild symptoms and can rest at home until they get better. Elderly people and those with chronic medical problems may be at risk for more serious complications.   FAQs about the COVID-19 vaccine   Are the vaccines safe?   Yes. Hundreds of millions of people in the [place] have already safely received COVID-19 vaccines under the most intense safety monitoring in the history of the [place].   Do I need the vaccine if I've already had COVID?   Yes. Vaccination helps protect you even if you've already had COVID.   If you had COVID-19 and had symptoms, wait to get vaccinated until you've recovered and completed your isolation period.   If you tested positive for COVID-19 but did not have symptoms, you can get vaccinated after 5 full days have passed since you had a positive test, as long as you don't develop symptoms.   How many doses of the vaccine do I need?   Visit www.cdc.gov/coronavirus/2019-ncov/vaccines/stay-up-to-date.html   to find out how to stay up to date with your COVID-19 vaccines.   I'm immunocompromised. How many doses of the vaccine do I need?   For information on how immunocompromised people can stay up to date with their COVID-19 vaccines, visit www.cdc.gov/coronavirus/2019-ncov/vaccines/recommendations/immuno.html  .   What are the common side effects of the vaccine?   A sore arm, tiredness, headache, and muscle pain may occur within two days of getting the vaccine and last a day or two. For the Moderna or Pfizer vaccines, side effects are more common after the second dose. People over the age of 55 are less likely to have side effects than younger people.   After I'm up to date on vaccines, can I still get or spread COVID?   Yes, you can still get COVID, but your  disease should be milder. And your risk of serious illness, hospitalization, and complications will be much lower, especially if you're up to date. Unfortunately, you can still spread COVID if you've been vaccinated. That's why it's important to follow isolation guidelines if you get sick or test positive.   After I'm up to date on vaccines, can I go back to normal?   You should still wear a mask indoors in public if:    It's required by laws, rules, regulations, or local guidance.    You have a weakened immune system.    Your age puts you at increased risk of severe disease.    You have a medical condition that puts you at increased risk of severe disease.    Someone in your household has a weakened immune system, is at increased risk for severe disease, or is unvaccinated.    You're in an area of high transmission.   Where can I get a COVID-19 vaccine?   Visit [facility]'s website for more information. To find a COVID-19 vaccination site near you, visit www.[employer].gov/  , call [phone]  , or text your zip code to [phone] ([place]). Message and data rates may apply.   I've had close contact with someone who has COVID. Do I need to quarantine, and if so, for how long?   For the most current answer, including a calculator to determine whether you need to stay home and for how long, visit www.cdc.gov/coronavirus/2019-ncov/your-health/isolation.html  .   I've tested positive for COVID. How long do I need to isolate?   For the latest recommendations, including a calculator to determine how long you need to stay home, visit www.cdc.gov/coronavirus/2019-ncov/your-health/isolation.html  .   What if I develop symptoms that might be from COVID?   For the latest recommendations on what to do if you're sick, including when to seek emergency care, visit www.cdc.gov/coronavirus/2019-ncov/if-you-are-sick/index.html  .    Flu vaccine information   Who should get a flu vaccine?   Everyone 6 months of age and older should  get a yearly flu vaccine.   When should I get vaccinated?   You should get a flu vaccine by the end of October. Once you're vaccinated, it takes about two weeks for antibodies to develop and protect you against the flu. That's why it's important to get vaccinated as soon as possible.   After October, is it too late to get vaccinated?   No. You should still get vaccinated. As long as the flu viruses are still in your community, flu vaccines will remain available, even into January of next year or later.   Why do I need a flu vaccine EVERY year?   Flu viruses are constantly changing, so flu vaccines are usually updated from one season to the next. Your protection from the flu vaccine also lessens over time.   Is the flu vaccine safe?   Yes. Over the last 50 years, hundreds of millions of Americans have safely received the flu vaccines.   What are the side effects of flu vaccines?   You CANNOT get the flu from a flu vaccine. Common side effects of the flu shot include soreness, redness and/or swelling where the shot was given, low grade fever, and aches. Common side effects of the nasal spray flu vaccine for adults include runny nose, headaches, sore throat, and cough. For children, side effects include wheezing, vomiting, muscle aches, and fever.   Does the flu vaccine increase your risk of getting COVID-19?   No. There is no evidence that getting a flu vaccine increases your risk of getting COVID-19.   Is it safe to get the flu vaccine along with a COVID-19 vaccine?   Yes. It's safe to get the flu vaccine with a COVID-19 vaccine or booster.   Contact your healthcare provider TODAY for details on when and where to get your flu vaccine.   Your provider   Your diagnosis was provided by SEN Sandoval, a member of your trusted care team at [facility].   If you have any questions, call us at [phone]  .

## 2024-04-06 NOTE — E-VISIT TREATED
Chief Complaint: Cold, flu, COVID, sinus, hay fever, or seasonal allergies   Patient introduction   Patient is 23-year-old female with sore throat that started 3 to 5 days ago. Regarding date of symptom onset, patient writes: 04/03/24.   COVID-19 testing history, vaccination status, and exposure:    Patient was tested for COVID-19 within the last 24 hours. Test result was negative.    Has not received an updated [id number] COVID-19 vaccination (Pfizer-BioNTech or Moderna vaccine after September 12, 2023; or Novavax vaccine after October 3, 2023).    No known exposure to a person with a confirmed or suspected case of COVID-19.    No high-risk (household) exposure to COVID-19 within the last 14 days.   Risk factors for severe disease from COVID-19 infection    Higher risk for severe disease from COVID-19 because of BMI >= 25.   General presentation   Symptoms came on suddenly.   Fever:    No fever.   Sinus and nasal symptoms:    No sinus pain or pressure.    No nasal or sinus congestion.    No nasal discharge.    No itchy nose or sneezing.   Throat symptoms:    Sore throat. Pain is bilateral but worse on the left side. Has redness in the back of the throat and on the tonsils.    Has had recent strep exposure.    Patient thinks they have strep.    Has pain when swallowing but can swallow liquids and solid foods.   Head and body aches:    No headache.    No sweats.    No chills.    No myalgia.    No fatigue.   Cough:    No cough.   Wheezing and shortness of breath:    No COPD diagnosis.    No asthma diagnosis.    No wheezing.    No shortness of breath.   Chest pain:    No chest pain.   Ear symptoms:    Current symptoms include pressure in the ear(s).   Dizziness:    No dizziness.   Allergies:    No history of allergies.   Flu exposure:    No recent known exposure to a person with a confirmed flu diagnosis.    Has not had a flu vaccine this season.   Patient is taking over-the-counter medications for current symptoms,  "including cetirizine and guaifenesin/dextromethorphan.   Review of red flags/alarm symptoms:    No changes in alertness or awareness.    No symptoms suggesting airway obstruction.    No decreased urination.    No blue or gray coloring present in face, lips, or nail beds.    No swelling, pain, redness, or increased warmth in the calf or lower part of ONE leg only.    No proptosis.   Risk factors for antibiotic resistance:    Antibiotic use for similar symptoms within the last 30 days.   Pregnancy/menstrual status/breastfeeding:    Not pregnant.    Not breastfeeding.    Regarding date of last menstrual period, patient writes: March 25.   Self-exam:    Height: 5' 4\"    Weight: 180 lbs    Tonsillar edema.    Purulent tonsils.    No palatal petechiae.    Swollen/painful neck lymph nodes.   Recent antibiotic use:    Has taken antibiotics for similar symptoms within the past month. Patient specifies the antibiotics taken as Amoxicillin.   Current medications   Currently taking sertraline 100 MG tablet, tiZANidine 4 MG tablet, Rosy 3-0.03 MG per tablet, amitriptyline 75 MG tablet, buPROPion  MG 24 hr tablet, eletriptan 20 MG tablet, and VITAMIN A/VITAMIN D/VITAMIN E.   Medication allergies   No relevant drug allergies.   Medication contraindication review   Patient has history of depression. Therefore, the following medication(s) will not be prescribed:    Metoclopramide.   No history of metoclopramide-associated dystonic reaction and tardive dyskinesia.   No known history of amoxicillin-clavulanate-associated cholestatic jaundice or hepatic impairment.   No known history of azithromycin-associated cholestatic jaundice or hepatic impairment.   Past medical history   Immune conditions:   No immunocompromising conditions.   No history of cancer.   Social history   Never smoked tobacco.   High-risk household contacts:    Household contact with diabetes.    Household contact with extreme obesity (BMI > 40).   " Patient-submitted comments   Patient was asked if they had anything to add about their symptoms. Patient writes: My nephew had strep last week and I was around him so I know that is what it is. Amoxicillin usually cures it. .   Patient did not request an excuse note.   Assessment   Strep pharyngitis.   This is the likely diagnosis based on patient's interview responses, including:    Symptom profile    Recent strep exposure   Plan   Medications:    amoxicillin 500 mg capsule RX 500mg 1 cap PO bid 10d for infection. This medication is an antibiotic. Take it exactly as directed. You must finish the entire course of medication, even if you feel better after the first few days of treatment. Amount is 20 cap.   The patient's prescription will be sent to:   [facility] #[id number] [address] [facility] [address]   Phone: [phone]     Fax: [phone]   Education:    Condition and causes    Prevention    Treatment and self-care    When to call provider   ----------   Electronically signed by SEN Sandoval on 2024-04-06 at 11:52AM   ----------   Patient Interview Transcript:   Which of these symptoms are bothering you? Select all that apply.    Sore throat   Not selected:    Cough    Shortness of breath    Stuffed-up nose or sinuses    Runny nose    Itchy or watery eyes    Itchy nose or sneezing    Loss of smell or taste    Hoarse voice or loss of voice    Headache    Fever    Sweats    Chills    Muscle or body aches    Fatigue or tiredness    Nausea or vomiting    Diarrhea    I don't have any of these symptoms   When did your current symptoms start? Select one.    3 to 5 days ago   Not selected:    Less than 48 hours ago    6 to 9 days ago    10 to 14 days ago    2 to 3 weeks ago    3 to 4 weeks ago    More than a month ago   Do you know the exact date your symptoms started? If so, enter the date as MM/DD/YY. Select one.    Yes (specify): 04/03/24   Not selected:    No   Did your symptoms come on  "suddenly or gradually? Select one.    Suddenly   Not selected:    Gradually    I'm not sure   Since your current symptoms started, have you been tested for COVID-19? This includes home self-tests as well as nose swab or saliva tests done at a doctor's office, lab, or testing site. Select one.    Yes   Not selected:    No   When was your most recent COVID-19 test? Select one.    Within the last 24 hours   Not selected:    Within the last week (specify date as MM/DD/YY)    7 to 14 days ago    15 to 30 days ago    More than 1 month ago   What was the result of your most recent COVID-19 test? Select one.    Negative   Not selected:    Positive   Has anyone in your household tested positive for COVID-19 in the past 14 days? Select one.    No   Not selected:    Yes   In the last 14 days, have you had close contact with someone who has COVID-19? \"Close contact\" means any of these: - Caring for someone with COVID-19. - Being within 6 feet of someone with COVID-19 for a total of at least 15 minutes over a 24-hour period. For example, three 5-minute exposures for a total of 15 minutes. - Being in direct contact with respiratory droplets from someone with COVID-19 (being coughed on, kissing, sharing utensils). Select one.    No, not that I know of   Not selected:    Yes, a confirmed case    Yes, a suspected case   Have you gotten the [id number] updated COVID-19 vaccine? This means either the updated Pfizer-BioNTech or Moderna vaccine after September 12, 2023; or the updated Novavax vaccine after October 3, 2023. Select one.    No   Not selected:    Yes   Since your symptoms started, have you felt dizzy? Select one.    No   Not selected:    Yes, but I can still do my regular daily activities    Yes, and it makes it hard to stand, walk, or do daily activities   Do you have chest pain? You might also feel it as discomfort, aching, tightness, or squeezing in the chest. Select one.    No   Not selected:    Yes   Do you feel sinus " pain or pressure in any of these areas?    No   Not selected:    In my forehead    Around my eyes    Behind my nose    In my cheeks    In my upper teeth or jaw   Can you swallow liquids and solid foods? A sore throat may be painful when swallowing, but it shouldn't prevent you from swallowing. Select one.    Yes, but it's painful   Not selected:    Yes, with ease    Yes, but it's uncomfortable    It's hard to swallow anything because it feels like liquids and food get stuck in my throat    No, I can't swallow anything, liquid or solid foods   Is your throat pain worse on one side than the other? Select one.    Yes, it's worse on the left side   Not selected:    Yes, it's worse on the right side    No, it's the same on both sides   Which of these best describes your throat pain? Select one.    Pain on both sides, but worse on the left   Not selected:    Severe pain on the left, and little to no pain on the right   To recommend the best treatment, we need to see photos of your throat. You can have someone else take the photo, or use a mirror. If you choose not to send photos, you can still continue this interview, but your treatment options may be affected. Select one.    I'd rather not send photos   Not selected:    Someone can take the photos for me    I'll take the photos myself   Have you urinated at least 3 times in the last 24 hours? Select one.    Yes   Not selected:    No   Do your face, lips, or nail beds appear blue or gray? Select one.    No   Not selected:    Yes   Do you have any swelling, pain, redness, or increased warmth in the calf or lower part of ONE leg only? Select one.    No   Not selected:    Yes   Changes in alertness or awareness may mean you need emergency care. Since your symptoms started, have you had any of these? Select all that apply.    None of the above   Not selected:    Confusion    Slurred speech    Not knowing where you are or what day it is    Difficulty staying conscious     Fainting or passing out   Do your symptoms include a whistling sound, or wheezing, when you breathe? Select one.    No   Not selected:    Yes   Since your symptoms started, have you noticed that one or both of your eyes is bulging or poking out? Select one.    No   Not selected:    Yes   Do you have any of these symptoms in your ear(s)? Select all that apply.    Pressure   Not selected:    Pain    Fullness    Crackling or popping    Plugged or blocked sensation    None of the above   Are your tonsils larger than usual?    Yes   Not selected:    No, not that I can tell    I've had my tonsils removed   Is there redness in the back of your throat or on your tonsils? Select all that apply.    Yes, in the back of the throat    Yes, on the tonsils   Not selected:    No, not that I can see   Is there any white or yellow pus on your tonsils?    Yes   Not selected:    No, not that I can see   Are there red spots on the roof of your mouth or the back of your throat?    No, not that I can see   Not selected:    Yes   Are your glands/lymph nodes swollen, or does it hurt when you touch them?    Yes   Not selected:    No, not that I can tell   In the past 2 weeks, has anyone around you (such as at school, work, or home) had a confirmed diagnosis of strep throat? A confirmed diagnosis means that a throat swab and lab test were done to verify a strep throat infection. Select one.    Yes   Not selected:    No, not that I know of   Do you think you might have strep throat? Select one.    Yes   Not selected:    No   In the past week, has anyone around you (such as at school, work, or home) had a confirmed diagnosis of the flu? A confirmed diagnosis means that a nose swab was done to verify a flu infection. Select all that apply.    No, not that I know of   Not selected:    I live with someone who has the flu    I've been within touching distance of someone who has the flu    I've walked by, or sat about 3 feet away from, someone who  has the flu    I've been in the same building as someone who has the flu   Have you ever been diagnosed with asthma? Select one.    No   Not selected:    Yes   Have you ever been diagnosed with chronic obstructive pulmonary disease (COPD)? Select one.    No, not that I know of   Not selected:    Yes   In the past month, have you taken antibiotics for similar symptoms? Examples of antibiotics include amoxicillin, amoxicillin-clavulanate (Augmentin), penicillin, cefdinir (Omnicef), doxycycline, and clindamycin (Cleocin). Select one.    Yes (specify): Amoxicillin   Not selected:    No   Do any of these apply to you? Select all that apply.    None of the above   Not selected:    I've been hospitalized within the last 5 days    I have diabetes    I'm in close contact with a child in    Do you have allergies (pollen, dust mites, mold, animal dander)? Select one.    No, not that I know of   Not selected:    Yes   Have you had a flu shot this season? Select one.    No   Not selected:    Yes, less than 2 weeks ago    Yes, 2 to 4 weeks ago    Yes, 1 to 3 months ago    Yes, 3 to 6 months ago    Yes, more than 6 months ago   Are you pregnant? Select one.    No   Not selected:    Yes   When was your last menstrual period? If you don't currently have periods or no longer have periods, please briefly explain.       Within the last 2 weeks, have you: - Given birth - Had a miscarriage - Had a pregnancy loss - Had an  Being postpartum (live birth or loss) within the last 2 weeks increases your risk of flu complications. Select one.    No   Not selected:    Yes   Are you breastfeeding? Select one.    No   Not selected:    Yes   The flu and COVID-19 can be more serious for people in certain groups. The next few questions help us figure out if you or anyone you live with is at higher risk for complications from these infections. Do any of these statements apply to you? Select all that apply.    None of the above    Not selected:    I'm     I'm     I'm Black    I'm  or    Do you smoke tobacco? Select one.    No   Not selected:    Yes, every day    Yes, some days    No, I quit   Do you have a mostly inactive lifestyle? Answer yes if all of these are true: - You spend at least 6 hours a day sitting or lying down - You get less than 2 and a half hours per week of moderate exercise such as walking fast - You get less than 1 hour and 15 minutes per week of intense exercise such as jogging or running Select one.    No   Not selected:    Yes   Do you have any of these conditions? Select all that apply.    None of the above   Not selected:    Chronic lung disease, such as cystic fibrosis or interstitial fibrosis    Heart disease, such as congenital heart disease, congestive heart failure, or coronary artery disease    Disorder of the brain, spinal cord, or nerves and muscles, such as dementia, cerebral palsy, epilepsy, muscular dystrophy, or developmental delay    Metabolic disorder or mitochondrial disease    Cerebrovascular disease, such as stroke or another condition affecting the blood vessels or blood supply to the brain    Down syndrome    Mood disorder, including depression or schizophrenia spectrum disorders    Substance use disorder, such as alcohol, opioid, or cocaine use disorder    Tuberculosis    Primary immunodeficiency   Do you live in a group care setting? Examples include: - Nursing home - Residential care - Psychiatric treatment facility - Group home - Dormitory - Board and care home - Homeless shelter - Foster care setting Select one.    No   Not selected:    Yes   Are you a healthcare worker? Select one.    No   Not selected:    Yes   People with a very high body mass index (BMI) are at higher risk for developing complications from the flu and severe illness from COVID-19. To determine your BMI, we need to know your weight and height. Please enter your weight (in pounds).     Weight   Please enter your height.    Height   Do you have any of these conditions that can affect the immune system? Scroll to see all options. Select all that apply.    None of these   Not selected:    History of bone marrow transplant    Chronic kidney disease    Chronic liver disease (including cirrhosis)    HIV/AIDS    Inflammatory bowel disease (Crohn's disease or ulcerative colitis)    Lupus    Moderate to severe plaque psoriasis    Multiple sclerosis    Rheumatoid arthritis    Sickle cell anemia    Alpha or beta thalassemia    History of kidney, liver, heart, or other solid organ transplant    History of liver, heart, or other solid organ transplant    History of spleen removal    An autoimmune disorder not listed here (specify)    A condition requiring treatment with long-term use of oral steroids (such as prednisone, prednisolone, or dexamethasone) (specify)   Have you ever been diagnosed with cancer? Select one.    No   Not selected:    Yes, I have cancer now    Yes, but I'm in remission   The flu and COVID-19 can be more serious for people in certain groups. Do any of these apply to the people who live with you? Select all that apply.    None of the above   Not selected:    Under age 5    Over age 65            Black     or     Pregnant    Has given birth, had a miscarriage, had a pregnancy loss, or had an  in the last 2 weeks   Does any member of your household have any of these medical conditions? Select all that apply.    Diabetes    Extreme obesity (BMI > 40)   Not selected:    Asthma    Disorders of the brain, spinal cord, or nerves and muscles, such as dementia, cerebral palsy, epilepsy, muscular dystrophy, or developmental delay    Chronic lung disease, such as COPD or cystic fibrosis    Heart disease, such as congenital heart disease, congestive heart failure, or coronary artery disease    Cerebrovascular disease, such as stroke or another  condition affecting the blood vessels or blood supply to the brain    Blood disorders, such as sickle cell disease    Metabolic disorders such as inherited metabolic disorders or mitochondrial disease    Kidney disorders    Liver disorders    Weakened immune system due to illness or medications such as chemotherapy or steroids    Children under the age of 19 who are on long-term aspirin therapy    None of the above   Do you have any of these conditions? Scroll to see all options. Select all that apply.    Depression   Not selected:    Aspirin triad (also known as Samter's triad or ASA triad)    Asthma or hives from taking aspirin or other NSAIDs, such as ibuprofen or naproxen    Blockage or narrowing of the blood vessels of the heart    Blood clotting disorder    Blood dyscrasia, such anemia, leukemia, lymphoma, or myeloma    Bone marrow depression    Catecholamine-releasing paraganglioma    Congenital long QT syndrome    Difficulty urinating or completely emptying your bladder    Uncorrected electrolyte abnormalities    Fungal infection    Gastrointestinal (GI) bleeding    Gastrointestinal (GI) obstruction    G6PD deficiency    Recent heart attack    High blood pressure    Irregular heartbeat or heart rhythm    Mononucleosis (mono)    Myasthenia gravis    Parkinson's disease    Pheochromocytoma    Reye syndrome    Seizure disorder    Thyroid disease    Ulcerative colitis    None of the above   Have you ever had either of these conditions? Select all that apply.    No   Not selected:    Metoclopramide-associated dystonic reaction    Tardive dyskinesia   Just a few more questions about medications, and then you're finished. Have you used any non-prescription medications or nasal sprays for your current symptoms? Examples include saline sprays, decongestants, NyQuil, and Tylenol. Select one.    Yes   Not selected:    No   Which of these non-prescription medications have you tried? Scroll to see all options. Select all  that apply.    Cetirizine (Zyrtec)    Guaifenesin/dextromethorphan (Delsym DM, Mucinex DM, Robitussin DM)   Not selected:    Acetaminophen (Tylenol)    Budesonide (Rhinocort)    Chlorpheniramine (Aller-chlor, Chlor-Trimeton)    Cromolyn (NasalCrom)    Dextromethorphan (Delsym, Robitussin, Vicks DayQuil Cough)    Diphenhydramine (Benadryl)    Fexofenadine (Allegra)    Fluticasone (Flonase)    Guaifenesin (Mucinex)    Ibuprofen (Advil, Motrin, Midol)    Ketotifen (Alaway, Zaditor)    Loratadine (Alavert, Claritin)    Naphazoline-pheniramine (Naphcon-A, Opcon-A, Visine-A)    Omeprazole (Prilosec)    Oxymetazoline (Afrin)    Phenylephrine (Sudafed PE)    Triamcinolone (Nasacort)    None of the above   Have you taken any monoamine oxidase inhibitor (MAOI) medications in the last 14 days? Examples include rasagiline (Azilect), selegiline (Eldepryl, Zelapar), isocarboxazid (Marplan), phenelzine (Nardil), and tranylcypromine (Parnate). Select one.    No, not that I know of   Not selected:    Yes   Do you take Kynmobi or Apokyn (apomorphine)? Select one.    No   Not selected:    Yes   Are you still taking these medications listed in your medical record? If you're not taking any of these, click Next. Select all that apply.    sertraline 100 MG tablet    tiZANidine 4 MG tablet    Rosy 3-0.03 MG per tablet    amitriptyline 75 MG tablet    buPROPion  MG 24 hr tablet    eletriptan 20 MG tablet   Are you taking any other medications, vitamins, or supplements? Select one.    Yes   Not selected:    No   Have you ever had an allergic or bad reaction to any medication? Select one.    Yes   Not selected:    No   Have you had an allergic or bad reaction to any of these medications? Select all that apply.    No, not that I know of   Not selected:    Baloxavir (Xofluza)    Benzonatate (Tessalon Perles)    Fluconazole, itraconazole, or terconazole (brands include Diflucan, Sporanox, Terazol)    Oseltamivir (Tamiflu) or zanamivir  "(Relenza)    Paxlovid, nirmatrelvir, or ritonavir (Norvir)   Have you had an allergic or bad reaction to any of these antibiotic medications? Select all that apply.    No, not that I know of   Not selected:    Penicillin or any \"-cillin\" antibiotic, such as amoxicillin, ampicillin, dicloxacillin, nafcillin, or piperacillin (Brands include Augmentin, Unasyn, and Zosyn)    Tetracycline or any \"-cycline\" antibiotic, such as doxycycline, demeclocycline, minocycline (Brands include Declomycin, Doryx, Dynacin, Oracea, Monodox, Panmycin, and Vibramycin)    Ciprofloxacin or any \"-floxacin\" antibiotic, such as gemifloxacin, levofloxacin, moxifloxacin, or ofloxacin (Brands include Factive, Cipro, Floxin, and Levaquin)    Cephalexin or any \"cef-\" antibiotic, such as cefazolin, cefdinir, cefuroxime, ceftriaxone, ceftazidime, or cefepime (Brands include Ancef, Ceftin, Fortaz, Keflex, Maxipime, Rocephin, and Simplicef)    Azithromycin or any \"-thromycin\" antibiotic, such as erythromycin or clarithromycin (Brands include Biaxin, Erythrocin, Z-patricia, and Zithromax)    Clindamycin or lincomycin (Brands include Cleocin and Lincocin)   Have you had an allergic or bad reaction to any of these medications? Select all that apply.    No, not that I know of   Not selected:    Albuterol or a similar medication    Atropine    Corticosteroid (steroid) medication, including topical steroids, inhaled steroids, nasal steroids, or oral steroids (budesonide, ciclesonide, dexamethasone, flunisolide, fluticasone, methylprednisolone, triamcinolone, prednisone (or brand names Alvesco, Deltasone, Flovent, Medrol, Nasacort, Rhinocort, or Veramyst)    Metoclopramide (Reglan)    Ondansetron (Zuplenz, Zofran ODT, Zofran)    Prochlorperazine (Compazine)   Have you had an allergic or bad reaction to any of these eye drops, nasal sprays, or inhalers? Scroll to see all options. Select all that apply.    No, not that I know of   Not selected:    Azelastine " (Astelin, Astepro, Optivar)    Cromolyn (Crolom, NasalCrom)    Ipratropium (Atrovent)    Ketotifen (Alaway, Zaditor)    Pheniramine/naphazoline (Naphcon-A, Opcon-A, Visine-A)    Olopatadine (Pataday, Patanol, Pazeo)   Have you had an allergic or bad reaction to any of these non-prescription medications? Scroll to see all options. Select all that apply.    No, not that I know of   Not selected:    Acetaminophen (Tylenol)    Aspirin    Cetirizine (Zyrtec)    Dextromethorphan (Delsym, Robitussin, Vicks DayQuil Cough)    Diphenhydramine (Benadryl)    Fexofenadine (Allegra)    Guaifenesin (Mucinex)    Dextromethorphan (Delsym)    Ibuprofen (Advil, Motrin, Midol)    Loratadine (Alavert, Claritin)    Oxymetazoline (Afrin)    Phenylephrine (Sudafed PE)    Pseudoephedrine (Sudafed)   Are you allergic to milk or to the proteins found in milk (for example, whey or casein)? A milk allergy is different from lactose intolerance. Select one.    No, not that I know of   Not selected:    Yes   Have you ever had jaundice or liver problems as a result of taking amoxicillin-clavulanate (Augmentin)? Jaundice is a condition in which the skin and the whites of the eyes turn yellow. Select all that apply.    No, not that I know of   Not selected:    Yes, jaundice    Yes, liver problems   Have you ever had jaundice or liver problems as a result of taking azithromycin (Zithromax, Zmax)? Jaundice is a condition in which the skin and the whites of the eyes turn yellow. Select all that apply.    No, not that I know of   Not selected:    Yes, jaundice    Yes, liver problems   Do you need a doctor's note? A doctor's note confirms that you received care today and states when you can return to school or work. It does not contain information about your diagnosis or treatment plan. Your provider will make the final decision on whether to give you a doctor's note and for how long. Doctor's notes CANNOT be backdated. We can't provide medical leave  paperwork through this type of visit. If more paperwork is needed to request time off, contact your primary care provider. Select one.    No   Not selected:    Today only (1 day)    Today and tomorrow (2 days)    3 days    5 days    7 days   Is there anything you'd like to add about your symptoms? Please limit your comments to the symptoms covered in this interview. If you include comments about other concerns, your provider may recommend that you be seen in person.    __My nephew had strep last week and I was around him so I know that is what it is. Amoxicillin usually cures it. __   ----------   Medical history   Medical history data does not currently exist for this patient.

## 2024-04-10 DIAGNOSIS — R19.4 CHANGE IN BOWEL HABITS: Primary | ICD-10-CM

## 2024-04-27 ENCOUNTER — E-VISIT (OUTPATIENT)
Dept: FAMILY MEDICINE CLINIC | Facility: TELEHEALTH | Age: 24
End: 2024-04-27
Payer: COMMERCIAL

## 2024-04-27 RX ORDER — PREDNISONE 20 MG/1
20 TABLET ORAL 2 TIMES DAILY
Qty: 10 TABLET | Refills: 0 | Status: SHIPPED | OUTPATIENT
Start: 2024-04-27 | End: 2024-05-02

## 2024-04-27 NOTE — E-VISIT TREATED
Chief Complaint: Cold, flu, COVID, sinus, hay fever, or seasonal allergies   Patient introduction   Patient is 23-year-old female with sore throat that started 3 to 5 days ago. Regarding date of symptom onset, patient writes: 04/23.   COVID-19 testing history, vaccination status, and exposure:    Patient was tested for COVID-19 within the last week. Patient specifies date of test as 04/24/24. Test result was negative.    Patient took a self-test during the interview. Test result was negative.    Has not received an updated [id number] COVID-19 vaccination (Pfizer-BioNTech or Moderna vaccine after September 12, 2023; or Novavax vaccine after October 3, 2023).    No known exposure to a person with a confirmed or suspected case of COVID-19.    No high-risk (household) exposure to COVID-19 within the last 14 days.   Risk factors for severe disease from COVID-19 infection    Higher risk for severe disease from COVID-19 because of BMI >= 25.    Patient is .   General presentation   Symptoms came on gradually.   Fever:    No fever.   Sinus and nasal symptoms:    No sinus pain or pressure.    No nasal or sinus congestion.    No nasal discharge.    No itchy nose or sneezing.   Throat symptoms:    Sore throat. Pain is bilateral but worse on the right side. Has redness in the back of the throat and on the tonsils.    Has had recent strep exposure.    Patient thinks they have strep.    Has pain when swallowing but can swallow liquids and solid foods.   Head and body aches:    No headache.    No sweats.    No chills.    No myalgia.    No fatigue.   Cough:    No cough.   Wheezing and shortness of breath:    No COPD diagnosis.    No asthma diagnosis.    No wheezing.    No shortness of breath.   Chest pain:    No chest pain.   Ear symptoms:    Current symptoms include fullness in the ear(s).   Dizziness:    No dizziness.   Allergies:    No history of allergies.   Flu exposure:    No recent known exposure to a person  "with a confirmed flu diagnosis.    Has not had a flu vaccine this season.   Patient is taking over-the-counter medications for current symptoms, including cetirizine, fluticasone, and guaifenesin/dextromethorphan.   Review of red flags/alarm symptoms:    No changes in alertness or awareness.    No symptoms suggesting airway obstruction.    No decreased urination.    No blue or gray coloring present in face, lips, or nail beds.    No swelling, pain, redness, or increased warmth in the calf or lower part of ONE leg only.    No proptosis.   Risk factors for antibiotic resistance:    Antibiotic use for similar symptoms within the last 30 days.   Pregnancy/menstrual status/breastfeeding:    Not pregnant.    Not breastfeeding.    Regarding date of last menstrual period, patient writes: April 23.   Self-exam:    Height: 5' 4\"    Weight: 180 lbs    Tonsillar edema.    Purulent tonsils.    No palatal petechiae.    Swollen/painful neck lymph nodes.   Recent antibiotic use:    Has taken antibiotics for similar symptoms within the past month. Patient specifies the antibiotics taken as Strep throat (amoxicillin).   Current medications   Currently taking sertraline 100 MG tablet, tiZANidine 4 MG tablet, Rosy 3-0.03 MG per tablet, amitriptyline 75 MG tablet, buPROPion  MG 24 hr tablet, eletriptan 20 MG tablet, biotin(as d-biotin), and VITAMIN A/VITAMIN D/VITAMIN E.   Medication allergies   No relevant drug allergies.   Medication contraindication review   No history of anaphylactic reaction to beta-lactam antibiotics; aspirin triad; blood dyscrasia; bone marrow depression; catecholamine-releasing paraganglioma; coronary artery disease; coagulation disorder; congenital long QT syndrome; depression; electrolyte abnormalities; fungal infection; GI bleeding; GI obstruction; G6PD deficiency; heart arrhythmia; hypertension; mononucleosis; myasthenia; recent myocardial infarction; NSAID-induced asthma/urticaria; Parkinson's " disease; pheochromocytoma; porphyria; Reye syndrome; seizure disorder; ulcerative colitis; and urinary retention.   No history of metoclopramide-associated dystonic reaction and tardive dyskinesia.   No known history of amoxicillin-clavulanate-associated cholestatic jaundice or hepatic impairment.   No known history of azithromycin-associated cholestatic jaundice or hepatic impairment.   Past medical history   Immune conditions:   No immunocompromising conditions.   No history of cancer.   Social history   Patient is . Never smoked tobacco.   High-risk household contacts:    Household contact with diabetes.   Patient-submitted comments   Patient was asked if they had anything to add about their symptoms. 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 He was contagious at school, so I'm assuming that's where I got it. .   Patient did not request an excuse note.   Assessment   Strep pharyngitis.   This is the likely diagnosis based on patient's interview responses, including:    Symptom profile    Recent strep exposure   Plan   Medications:    amoxicillin 500 mg capsule RX 500mg 1 cap PO bid 10d for infection. This medication is an antibiotic. Take it exactly as directed. You must finish the entire course of medication, even if you feel better after the first few days of treatment. Amount is 20 cap.   The patient's prescription will be sent to:   [id number]   [address] [place] [address]   Phone: [phone]     Fax: [phone]   Education:    Condition and causes    Prevention    Treatment and self-care    When to call provider   ----------   Electronically signed by SEN Graham FNP-BC on 2024-04-27 at 03:15AM   ----------   Patient Interview Transcript:   Which of these symptoms are bothering you? Select all that apply.    Sore throat   Not selected:    Cough    Shortness of breath    Stuffed-up nose or sinuses    Runny nose    Itchy or watery eyes     Itchy nose or sneezing    Loss of smell or taste    Hoarse voice or loss of voice    Headache    Fever    Sweats    Chills    Muscle or body aches    Fatigue or tiredness    Nausea or vomiting    Diarrhea    I don't have any of these symptoms   When did your current symptoms start? Select one.    3 to 5 days ago   Not selected:    Less than 48 hours ago    6 to 9 days ago    10 to 14 days ago    2 to 3 weeks ago    3 to 4 weeks ago    More than a month ago   Do you know the exact date your symptoms started? If so, enter the date as MM/DD/YY. Select one.    Yes (specify): 04/23   Not selected:    No   Did your symptoms come on suddenly or gradually? Select one.    Gradually   Not selected:    Suddenly    I'm not sure   Since your current symptoms started, have you been tested for COVID-19? This includes home self-tests as well as nose swab or saliva tests done at a doctor's office, lab, or testing site. Select one.    Yes   Not selected:    No   When was your most recent COVID-19 test? Select one.    Within the last week (specify date as MM/DD/YY): 04/24/24   Not selected:    Within the last 24 hours    7 to 14 days ago    15 to 30 days ago    More than 1 month ago   What was the result of your most recent COVID-19 test? Select one.    Negative   Not selected:    Positive   Even though your last test was negative, you could still have COVID. You may have tested before the virus was detectable. In some cases, repeat testing over several days is needed. - If you have a COVID test kit, take the test now before continuing this interview. - If you choose not to take a test or don't have one, you should still continue this interview. Your provider can still help you get care. Do you have a COVID test kit? Select one.    Yes, and I'll take another test now   Not selected:    Yes, but I prefer not to take a test now    No, I don't have a test kit   What is the result of the COVID-19 home test you just took? Select one.    " Negative   Not selected:    Positive   Has anyone in your household tested positive for COVID-19 in the past 14 days? Select one.    No   Not selected:    Yes   In the last 14 days, have you had close contact with someone who has COVID-19? \"Close contact\" means any of these: - Caring for someone with COVID-19. - Being within 6 feet of someone with COVID-19 for a total of at least 15 minutes over a 24-hour period. For example, three 5-minute exposures for a total of 15 minutes. - Being in direct contact with respiratory droplets from someone with COVID-19 (being coughed on, kissing, sharing utensils). Select one.    No, not that I know of   Not selected:    Yes, a confirmed case    Yes, a suspected case   Have you gotten the [id number] updated COVID-19 vaccine? This means either the updated Pfizer-BioNTech or Moderna vaccine after September 12, 2023; or the updated Novavax vaccine after October 3, 2023. Select one.    No   Not selected:    Yes   Since your symptoms started, have you felt dizzy? Select one.    No   Not selected:    Yes, but I can still do my regular daily activities    Yes, and it makes it hard to stand, walk, or do daily activities   Do you have chest pain? You might also feel it as discomfort, aching, tightness, or squeezing in the chest. Select one.    No   Not selected:    Yes   Do you feel sinus pain or pressure in any of these areas?    No   Not selected:    In my forehead    Around my eyes    Behind my nose    In my cheeks    In my upper teeth or jaw   Can you swallow liquids and solid foods? A sore throat may be painful when swallowing, but it shouldn't prevent you from swallowing. Select one.    Yes, but it's painful   Not selected:    Yes, with ease    Yes, but it's uncomfortable    It's hard to swallow anything because it feels like liquids and food get stuck in my throat    No, I can't swallow anything, liquid or solid foods   Is your throat pain worse on one side than the other? Select " one.    Yes, it's worse on the right side   Not selected:    Yes, it's worse on the left side    No, it's the same on both sides   Which of these best describes your throat pain? Select one.    Pain on both sides, but worse on the right   Not selected:    Severe pain on the right, and little to no pain on the left   To recommend the best treatment, we need to see photos of your throat. You can have someone else take the photo, or use a mirror. If you choose not to send photos, you can still continue this interview, but your treatment options may be affected. Select one.    I'd rather not send photos   Not selected:    Someone can take the photos for me    I'll take the photos myself   Have you urinated at least 3 times in the last 24 hours? Select one.    Yes   Not selected:    No   Do your face, lips, or nail beds appear blue or gray? Select one.    No   Not selected:    Yes   Do you have any swelling, pain, redness, or increased warmth in the calf or lower part of ONE leg only? Select one.    No   Not selected:    Yes   Changes in alertness or awareness may mean you need emergency care. Since your symptoms started, have you had any of these? Select all that apply.    None of the above   Not selected:    Confusion    Slurred speech    Not knowing where you are or what day it is    Difficulty staying conscious    Fainting or passing out   Do your symptoms include a whistling sound, or wheezing, when you breathe? Select one.    No   Not selected:    Yes   Since your symptoms started, have you noticed that one or both of your eyes is bulging or poking out? Select one.    No   Not selected:    Yes   Do you have any of these symptoms in your ear(s)? Select all that apply.    Fullness   Not selected:    Pain    Pressure    Crackling or popping    Plugged or blocked sensation    None of the above   Are your tonsils larger than usual?    Yes   Not selected:    No, not that I can tell    I've had my tonsils removed   Is  there redness in the back of your throat or on your tonsils? Select all that apply.    Yes, in the back of the throat    Yes, on the tonsils   Not selected:    No, not that I can see   Is there any white or yellow pus on your tonsils?    Yes   Not selected:    No, not that I can see   Are there red spots on the roof of your mouth or the back of your throat?    No, not that I can see   Not selected:    Yes   Are your glands/lymph nodes swollen, or does it hurt when you touch them?    Yes   Not selected:    No, not that I can tell   In the past 2 weeks, has anyone around you (such as at school, work, or home) had a confirmed diagnosis of strep throat? A confirmed diagnosis means that a throat swab and lab test were done to verify a strep throat infection. Select one.    Yes   Not selected:    No, not that I know of   Do you think you might have strep throat? Select one.    Yes   Not selected:    No   In the past week, has anyone around you (such as at school, work, or home) had a confirmed diagnosis of the flu? A confirmed diagnosis means that a nose swab was done to verify a flu infection. Select all that apply.    No, not that I know of   Not selected:    I live with someone who has the flu    I've been within touching distance of someone who has the flu    I've walked by, or sat about 3 feet away from, someone who has the flu    I've been in the same building as someone who has the flu   Have you ever been diagnosed with asthma? Select one.    No   Not selected:    Yes   Have you ever been diagnosed with chronic obstructive pulmonary disease (COPD)? Select one.    No, not that I know of   Not selected:    Yes   In the past month, have you taken antibiotics for similar symptoms? Examples of antibiotics include amoxicillin, amoxicillin-clavulanate (Augmentin), penicillin, cefdinir (Omnicef), doxycycline, and clindamycin (Cleocin). Select one.    Yes (specify): Strep throat (amoxicillin)   Not selected:    No   Do any  of these apply to you? Select all that apply.    None of the above   Not selected:    I've been hospitalized within the last 5 days    I have diabetes    I'm in close contact with a child in    Do you have allergies (pollen, dust mites, mold, animal dander)? Select one.    No, not that I know of   Not selected:    Yes   Have you had a flu shot this season? Select one.    No   Not selected:    Yes, less than 2 weeks ago    Yes, 2 to 4 weeks ago    Yes, 1 to 3 months ago    Yes, 3 to 6 months ago    Yes, more than 6 months ago   Are you pregnant? Select one.    No   Not selected:    Yes   When was your last menstrual period? If you don't currently have periods or no longer have periods, please briefly explain.       Within the last 2 weeks, have you: - Given birth - Had a miscarriage - Had a pregnancy loss - Had an  Being postpartum (live birth or loss) within the last 2 weeks increases your risk of flu complications. Select one.    No   Not selected:    Yes   Are you breastfeeding? Select one.    No   Not selected:    Yes   The flu and COVID-19 can be more serious for people in certain groups. The next few questions help us figure out if you or anyone you live with is at higher risk for complications from these infections. Do any of these statements apply to you? Select all that apply.    I'm    Not selected:    I'm     I'm Black    I'm  or     None of the above   Do you smoke tobacco? Select one.    No   Not selected:    Yes, every day    Yes, some days    No, I quit   Do you have a mostly inactive lifestyle? Answer yes if all of these are true: - You spend at least 6 hours a day sitting or lying down - You get less than 2 and a half hours per week of moderate exercise such as walking fast - You get less than 1 hour and 15 minutes per week of intense exercise such as jogging or running Select one.    No   Not selected:    Yes   Do you have any of these  conditions? Select all that apply.    None of the above   Not selected:    Chronic lung disease, such as cystic fibrosis or interstitial fibrosis    Heart disease, such as congenital heart disease, congestive heart failure, or coronary artery disease    Disorder of the brain, spinal cord, or nerves and muscles, such as dementia, cerebral palsy, epilepsy, muscular dystrophy, or developmental delay    Metabolic disorder or mitochondrial disease    Cerebrovascular disease, such as stroke or another condition affecting the blood vessels or blood supply to the brain    Down syndrome    Mood disorder, including depression or schizophrenia spectrum disorders    Substance use disorder, such as alcohol, opioid, or cocaine use disorder    Tuberculosis    Primary immunodeficiency   Do you live in a group care setting? Examples include: - Nursing home - Residential care - Psychiatric treatment facility - Group home - [facility] - [facility] and care home - Homeless shelter - Foster care setting Select one.    No   Not selected:    Yes   Are you a healthcare worker? Select one.    No   Not selected:    Yes   People with a very high body mass index (BMI) are at higher risk for developing complications from the flu and severe illness from COVID-19. To determine your BMI, we need to know your weight and height. Please enter your weight (in pounds).    Weight   Please enter your height.    Height   Do you have any of these conditions that can affect the immune system? Scroll to see all options. Select all that apply.    None of these   Not selected:    History of bone marrow transplant    Chronic kidney disease    Chronic liver disease (including cirrhosis)    HIV/AIDS    Inflammatory bowel disease (Crohn's disease or ulcerative colitis)    Lupus    Moderate to severe plaque psoriasis    Multiple sclerosis    Rheumatoid arthritis    Sickle cell anemia    Alpha or beta thalassemia    History of kidney, liver, heart, or other solid organ  transplant    History of liver, heart, or other solid organ transplant    History of spleen removal    An autoimmune disorder not listed here (specify)    A condition requiring treatment with long-term use of oral steroids (such as prednisone, prednisolone, or dexamethasone) (specify)   Have you ever been diagnosed with cancer? Select one.    No   Not selected:    Yes, I have cancer now    Yes, but I'm in remission   The flu and COVID-19 can be more serious for people in certain groups. Do any of these apply to the people who live with you? Select all that apply.    None of the above   Not selected:    Under age 5    Over age 65            Black     or     Pregnant    Has given birth, had a miscarriage, had a pregnancy loss, or had an  in the last 2 weeks   Does any member of your household have any of these medical conditions? Select all that apply.    Diabetes   Not selected:    Asthma    Disorders of the brain, spinal cord, or nerves and muscles, such as dementia, cerebral palsy, epilepsy, muscular dystrophy, or developmental delay    Chronic lung disease, such as COPD or cystic fibrosis    Heart disease, such as congenital heart disease, congestive heart failure, or coronary artery disease    Cerebrovascular disease, such as stroke or another condition affecting the blood vessels or blood supply to the brain    Blood disorders, such as sickle cell disease    Metabolic disorders such as inherited metabolic disorders or mitochondrial disease    Kidney disorders    Liver disorders    Weakened immune system due to illness or medications such as chemotherapy or steroids    Children under the age of 19 who are on long-term aspirin therapy    Extreme obesity (BMI > 40)    None of the above   Do you have any of these conditions? Scroll to see all options. Select all that apply.    None of the above   Not selected:    Aspirin triad (also known as Samter's triad or ASA  triad)    Asthma or hives from taking aspirin or other NSAIDs, such as ibuprofen or naproxen    Blockage or narrowing of the blood vessels of the heart    Blood clotting disorder    Blood dyscrasia, such anemia, leukemia, lymphoma, or myeloma    Bone marrow depression    Catecholamine-releasing paraganglioma    Congenital long QT syndrome    Depression    Difficulty urinating or completely emptying your bladder    Uncorrected electrolyte abnormalities    Fungal infection    Gastrointestinal (GI) bleeding    Gastrointestinal (GI) obstruction    G6PD deficiency    Recent heart attack    High blood pressure    Irregular heartbeat or heart rhythm    Mononucleosis (mono)    Myasthenia gravis    Parkinson's disease    Pheochromocytoma    Reye syndrome    Seizure disorder    Thyroid disease    Ulcerative colitis   Have you ever had either of these conditions? Select all that apply.    No   Not selected:    Metoclopramide-associated dystonic reaction    Tardive dyskinesia   Just a few more questions about medications, and then you're finished. Have you used any non-prescription medications or nasal sprays for your current symptoms? Examples include saline sprays, decongestants, NyQuil, and Tylenol. Select one.    Yes   Not selected:    No   Which of these non-prescription medications have you tried? Scroll to see all options. Select all that apply.    Cetirizine (Zyrtec)    Fluticasone (Flonase)    Guaifenesin/dextromethorphan (Delsym DM, Mucinex DM, Robitussin DM)   Not selected:    Acetaminophen (Tylenol)    Budesonide (Rhinocort)    Chlorpheniramine (Aller-chlor, Chlor-Trimeton)    Cromolyn (NasalCrom)    Dextromethorphan (Delsym, Robitussin, Vicks DayQuil Cough)    Diphenhydramine (Benadryl)    Fexofenadine (Allegra)    Guaifenesin (Mucinex)    Ibuprofen (Advil, Motrin, Midol)    Ketotifen (Alaway, Zaditor)    Loratadine (Alavert, Claritin)    Naphazoline-pheniramine (Naphcon-A, Opcon-A, Visine-A)    Omeprazole  "(Prilosec)    Oxymetazoline (Afrin)    Phenylephrine (Sudafed PE)    Triamcinolone (Nasacort)    None of the above   Have you taken any monoamine oxidase inhibitor (MAOI) medications in the last 14 days? Examples include rasagiline (Azilect), selegiline (Eldepryl, Zelapar), isocarboxazid (Marplan), phenelzine (Nardil), and tranylcypromine (Parnate). Select one.    No, not that I know of   Not selected:    Yes   Do you take Kynmobi or Apokyn (apomorphine)? Select one.    No   Not selected:    Yes   Are you still taking these medications listed in your medical record? If you're not taking any of these, click Next. Select all that apply.    sertraline 100 MG tablet    tiZANidine 4 MG tablet    Rosy 3-0.03 MG per tablet    amitriptyline 75 MG tablet    buPROPion  MG 24 hr tablet    eletriptan 20 MG tablet   Are you taking any other medications, vitamins, or supplements? Select one.    Yes   Not selected:    No   Have you ever had an allergic or bad reaction to any medication? Select one.    Yes   Not selected:    No   Have you had an allergic or bad reaction to any of these medications? Select all that apply.    No, not that I know of   Not selected:    Baloxavir (Xofluza)    Benzonatate (Tessalon Perles)    Fluconazole, itraconazole, or terconazole (brands include Diflucan, Sporanox, Terazol)    Oseltamivir (Tamiflu) or zanamivir (Relenza)    Paxlovid, nirmatrelvir, or ritonavir (Norvir)   Have you had an allergic or bad reaction to any of these antibiotic medications? Select all that apply.    No, not that I know of   Not selected:    Penicillin or any \"-cillin\" antibiotic, such as amoxicillin, ampicillin, dicloxacillin, nafcillin, or piperacillin (Brands include Augmentin, Unasyn, and Zosyn)    Tetracycline or any \"-cycline\" antibiotic, such as doxycycline, demeclocycline, minocycline (Brands include Declomycin, Doryx, Dynacin, Oracea, Monodox, Panmycin, and Vibramycin)    Ciprofloxacin or any \"-floxacin\" " "antibiotic, such as gemifloxacin, levofloxacin, moxifloxacin, or ofloxacin (Brands include Factive, Cipro, Floxin, and Levaquin)    Cephalexin or any \"cef-\" antibiotic, such as cefazolin, cefdinir, cefuroxime, ceftriaxone, ceftazidime, or cefepime (Brands include Ancef, Ceftin, Fortaz, Keflex, Maxipime, Rocephin, and Simplicef)    Azithromycin or any \"-thromycin\" antibiotic, such as erythromycin or clarithromycin (Brands include Biaxin, Erythrocin, Z-patricia, and Zithromax)    Clindamycin or lincomycin (Brands include Cleocin and Lincocin)   Have you had an allergic or bad reaction to any of these medications? Select all that apply.    No, not that I know of   Not selected:    Albuterol or a similar medication    Atropine    Corticosteroid (steroid) medication, including topical steroids, inhaled steroids, nasal steroids, or oral steroids (budesonide, ciclesonide, dexamethasone, flunisolide, fluticasone, methylprednisolone, triamcinolone, prednisone (or brand names Alvesco, Deltasone, Flovent, Medrol, Nasacort, Rhinocort, or Veramyst)    Metoclopramide (Reglan)    Ondansetron (Zuplenz, Zofran ODT, Zofran)    Prochlorperazine (Compazine)   Have you had an allergic or bad reaction to any of these eye drops, nasal sprays, or inhalers? Scroll to see all options. Select all that apply.    No, not that I know of   Not selected:    Azelastine (Astelin, Astepro, Optivar)    Cromolyn (Crolom, NasalCrom)    Ipratropium (Atrovent)    Ketotifen (Alaway, Zaditor)    Pheniramine/naphazoline (Naphcon-A, Opcon-A, Visine-A)    Olopatadine (Pataday, Patanol, Pazeo)   Have you had an allergic or bad reaction to any of these non-prescription medications? Scroll to see all options. Select all that apply.    No, not that I know of   Not selected:    Acetaminophen (Tylenol)    Aspirin    Cetirizine (Zyrtec)    Dextromethorphan (Delsym, Robitussin, Vicks DayQuil Cough)    Diphenhydramine (Benadryl)    Fexofenadine (Allegra)    Guaifenesin " (Mucinex)    Dextromethorphan (Delsym)    Ibuprofen (Advil, Motrin, Midol)    Loratadine (Alavert, Claritin)    Oxymetazoline (Afrin)    Phenylephrine (Sudafed PE)    Pseudoephedrine (Sudafed)   Are you allergic to milk or to the proteins found in milk (for example, whey or casein)? A milk allergy is different from lactose intolerance. Select one.    No, not that I know of   Not selected:    Yes   Have you ever had jaundice or liver problems as a result of taking amoxicillin-clavulanate (Augmentin)? Jaundice is a condition in which the skin and the whites of the eyes turn yellow. Select all that apply.    No, not that I know of   Not selected:    Yes, jaundice    Yes, liver problems   Have you ever had jaundice or liver problems as a result of taking azithromycin (Zithromax, Zmax)? Jaundice is a condition in which the skin and the whites of the eyes turn yellow. Select all that apply.    No, not that I know of   Not selected:    Yes, jaundice    Yes, liver problems   Do you need a doctor's note? A doctor's note confirms that you received care today and states when you can return to school or work. It does not contain information about your diagnosis or treatment plan. Your provider will make the final decision on whether to give you a doctor's note and for how long. Doctor's notes CANNOT be backdated. We can't provide medical leave paperwork through this type of visit. If more paperwork is needed to request time off, contact your primary care provider. Select one.    No   Not selected:    Today only (1 day)    Today and tomorrow (2 days)    3 days    5 days    7 days   Is there anything you'd like to add about your symptoms? Please limit your comments to the symptoms covered in this interview. If you include comments about other concerns, your provider may recommend that you be seen in person.    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 He was contagious at school, so I'm assuming that's where I got it.  __   ----------   Medical history   Medical history data does not currently exist for this patient.

## 2024-04-27 NOTE — EXTERNAL PATIENT INSTRUCTIONS
Note   I have prescribed Amoxicillin and Prednisone. Use warm salt water gargles, Chloraseptic throat lozenges, or chicken broth or tea as warm as you can tolerate for throat pain. If your symptoms do not improve or become worse, follow up with your doctor.   Diagnosis   Strep pharyngitis (strep throat)   My name is SEN Graham FNP-BC, and I'm a healthcare provider at [facility]. I reviewed your interview, and I see that you have strep throat. This can be a painful condition, but it responds well to treatment.   To prevent the spread of illness to others, stay home and away from other people as much as possible while you're sick. When you need to be around other people, consider wearing a face mask.   Medications   Your pharmacy   [id number] [address] [place] [id number] [phone]     Prescription   Amoxicillin (500mg): Take 1 capsule by mouth twice a day for 10 days for infection. This medication is an antibiotic. Take it exactly as directed. You must finish the entire course of medication, even if you feel better after the first few days of treatment.    Start taking the antibiotics I've prescribed right away. You need to finish the entire course of antibiotics, even if you start to feel better before the pills run out.    Some people develop a yeast infection after taking antibiotics. If you get a yeast infection, you can treat it with antifungal creams or suppositories. These are available without a prescription at [employer] and many supermarkets.   About your diagnosis   Strep throat is an infection in the throat and tonsils caused by group A streptococcus bacteria. Strep throat is most often spread by droplets that are released into the air after an infected person coughs or sneezes. You can also get strep throat by sharing cups or utensils with an infected person.   Symptoms usually begin within 2 to 5 days after a person has been exposed. The pain from strep throat usually  "starts quickly and can be severe, especially when swallowing. Body aches and pains are common.   What to expect   If you follow this treatment plan, you should start to feel better within a few days.   When to seek care   Call us at [phone]   with any sudden or unexpected symptoms.    Symptoms that don't improve within 48 hours of starting antibiotics.    Symptoms that get better for a few days and then suddenly get worse.    Worsening fever.    Your throat pain becomes worse and makes swallowing extremely difficult or impossible.    Muffled voice (it may sound like you are talking with a mouthful of food).    Difficulty opening your mouth or jaw.    Stiff neck.    Joint pain, or swelling that moves from joint to joint.    Severe stomach pain.    Shortness of breath.    Nosebleed.    Severe fatigue.    A new rash.    Odd emotional or behavioral changes.    Uncontrollable body movements or \"jerkiness.\"    Severe chest pain.   Other treatment    Rest and drink plenty of water.    Choose throat-friendly liquids and foods, such as lemon tea, broth, applesauce, frozen yogurt, or popsicles.    Gargle with salt water several times a day to help with your throat pain.    Try cough drops and throat lozenges for extra relief.    Stir a teaspoon of honey into warm water or weak tea to help soothe a sore throat.    Avoid smoke and air pollution. Smoke can make infections worse.   Prevention    Avoid close contact with other people when you're sick.    Cover your mouth and nose when you cough or sneeze. Use a tissue or cough into your elbow. Make sure that used tissues go directly into the trash.    Avoid touching your eyes, nose, or mouth while you're sick.    Wash your hands often, especially after coughing, sneezing, or blowing your nose. If soap and water are not available, use an alcohol-based hand .    If you or someone in your home or workplace is sick, disinfect commonly used items. This includes door " handles, tables, computers, remotes, and pens.    Coronavirus (COVID-19) information   Common symptoms of COVID-19 include fever, cough, shortness of breath, fatigue, muscle or body aches, headaches, new loss of sense of taste or smell, sore throat, stuffy or runny nose, nausea or vomiting, and diarrhea. Most people who get COVID-19 have mild symptoms and can rest at home until they get better. Elderly people and those with chronic medical problems may be at risk for more serious complications.   FAQs about the COVID-19 vaccine   Are the vaccines safe?   Yes. Hundreds of millions of people in the [place] have already safely received COVID-19 vaccines under the most intense safety monitoring in the history of the [place].   Do I need the vaccine if I've already had COVID?   Yes. Vaccination helps protect you even if you've already had COVID.   If you had COVID-19 and had symptoms, wait to get vaccinated until you've recovered and completed your isolation period.   If you tested positive for COVID-19 but did not have symptoms, you can get vaccinated after 5 full days have passed since you had a positive test, as long as you don't develop symptoms.   How many doses of the vaccine do I need?   Visit www.cdc.gov/coronavirus/2019-ncov/vaccines/stay-up-to-date.html   to find out how to stay up to date with your COVID-19 vaccines.   I'm immunocompromised. How many doses of the vaccine do I need?   For information on how immunocompromised people can stay up to date with their COVID-19 vaccines, visit www.cdc.gov/coronavirus/2019-ncov/vaccines/recommendations/immuno.html  .   What are the common side effects of the vaccine?   A sore arm, tiredness, headache, and muscle pain may occur within two days of getting the vaccine and last a day or two. For the Moderna or Pfizer vaccines, side effects are more common after the second dose. People over the age of 55 are less likely to have side effects than younger people.   After I'm up to date  on vaccines, can I still get or spread COVID?   Yes, you can still get COVID, but your disease should be milder. And your risk of serious illness, hospitalization, and complications will be much lower, especially if you're up to date. Unfortunately, you can still spread COVID if you've been vaccinated. That's why it's important to follow isolation guidelines if you get sick or test positive.   After I'm up to date on vaccines, can I go back to normal?   You should still wear a mask indoors in public if:    It's required by laws, rules, regulations, or local guidance.    You have a weakened immune system.    Your age puts you at increased risk of severe disease.    You have a medical condition that puts you at increased risk of severe disease.    Someone in your household has a weakened immune system, is at increased risk for severe disease, or is unvaccinated.    You're in an area of high transmission.   Where can I get a COVID-19 vaccine?   Visit [facility]'s website for more information. To find a COVID-19 vaccination site near you, visit www.vaccines.gov/  , call [phone]  , or text your zip code to 713216 ([place]). Message and data rates may apply.   I've had close contact with someone who has COVID. Do I need to quarantine, and if so, for how long?   For the most current answer, including a calculator to determine whether you need to stay home and for how long, visit www.cdc.gov/coronavirus/2019-ncov/your-health/isolation.html  .   I've tested positive for COVID. How long do I need to isolate?   For the latest recommendations, including a calculator to determine how long you need to stay home, visit www.cdc.gov/coronavirus/2019-ncov/your-health/isolation.html  .   What if I develop symptoms that might be from COVID?   For the latest recommendations on what to do if you're sick, including when to seek emergency care, visit www.cdc.gov/coronavirus/2019-ncov/if-you-are-sick/index.html  .    Flu vaccine  information   Who should get a flu vaccine?   Everyone 6 months of age and older should get a yearly flu vaccine.   When should I get vaccinated?   You should get a flu vaccine by the end of October. Once you're vaccinated, it takes about two weeks for antibodies to develop and protect you against the flu. That's why it's important to get vaccinated as soon as possible.   After October, is it too late to get vaccinated?   No. You should still get vaccinated. As long as the flu viruses are still in your community, flu vaccines will remain available, even into January of next year or later.   Why do I need a flu vaccine EVERY year?   Flu viruses are constantly changing, so flu vaccines are usually updated from one season to the next. Your protection from the flu vaccine also lessens over time.   Is the flu vaccine safe?   Yes. Over the last 50 years, hundreds of millions of Americans have safely received the flu vaccines.   What are the side effects of flu vaccines?   You CANNOT get the flu from a flu vaccine. Common side effects of the flu shot include soreness, redness and/or swelling where the shot was given, low grade fever, and aches. Common side effects of the nasal spray flu vaccine for adults include runny nose, headaches, sore throat, and cough. For children, side effects include wheezing, vomiting, muscle aches, and fever.   Does the flu vaccine increase your risk of getting COVID-19?   No. There is no evidence that getting a flu vaccine increases your risk of getting COVID-19.   Is it safe to get the flu vaccine along with a COVID-19 vaccine?   Yes. It's safe to get the flu vaccine with a COVID-19 vaccine or booster.   Contact your healthcare provider TODAY for details on when and where to get your flu vaccine.   Your provider   Your diagnosis was provided by SEN Graham FNP-BC, a member of your trusted care team at [facility].   If you have any questions, call us at [phone]  .

## 2024-05-11 DIAGNOSIS — B37.31 VAGINAL CANDIDA: ICD-10-CM

## 2024-05-13 RX ORDER — FLUCONAZOLE 200 MG/1
200 TABLET ORAL DAILY
Qty: 5 TABLET | Refills: 1 | Status: SHIPPED | OUTPATIENT
Start: 2024-05-13

## 2024-05-13 NOTE — TELEPHONE ENCOUNTER
Rx Refill Note  Requested Prescriptions     Pending Prescriptions Disp Refills    fluconazole (DIFLUCAN) 200 MG tablet [Pharmacy Med Name: FLUCONAZOLE 200 MG TABLET] 5 tablet 1     Sig: TAKE 1 TABLET BY MOUTH EVERY DAY      Last office visit with prescribing clinician: 4/2/2024   Last telemedicine visit with prescribing clinician: Visit date not found   Next office visit with prescribing clinician: 8/5/2024                         Would you like a call back once the refill request has been completed: [] Yes [] No    If the office needs to give you a call back, can they leave a voicemail: [] Yes [] No    Brenda Del Rio RN  05/13/24, 07:34 CDT

## 2024-05-19 ENCOUNTER — E-VISIT (OUTPATIENT)
Dept: FAMILY MEDICINE CLINIC | Facility: TELEHEALTH | Age: 24
End: 2024-05-19
Payer: COMMERCIAL

## 2024-05-19 PROCEDURE — BRIGHTMDVISIT: Performed by: NURSE PRACTITIONER

## 2024-05-19 NOTE — E-VISIT ESCALATED
Patient escalated   Provider SEN Galloway chose to escalate patient to another level of care because: Patient needs a lab test   Additional Details: multiple strep visits   Patient was sent the following message:   Based on the information you've provided us, you need to take another step to get care. Since you have been treated twice since 4/6 and most recently on 4/27 for Strep throat, you will need to go into Urgent Care and be tested to make sure that you really have it. You can have sore throat without having positive strep and sore throat can be caused by other things.   What to do now:   Urgent Care   You should be seen within the next 24 hours.   Please go to a walk-in clinic or urgent care, or make an appointment to be seen within the next 24 hours.   You won't be charged for your eVisit. If you paid with a credit card, the charge will be reversed.   Chief Complaint: Cold, flu, COVID, sinus, hay fever, or seasonal allergies   Patient introduction   Patient is 23-year-old female with sore throat that started less than 48 hours ago.   COVID-19 testing history, vaccination status, and exposure:    Patient was tested for COVID-19 within the last 24 hours. Test result was negative.    Has not received an updated [id number] COVID-19 vaccination (Pfizer-BioNTech or Moderna vaccine after September 12, 2023; or Novavax vaccine after October 3, 2023).    No known exposure to a person with a confirmed or suspected case of COVID-19.    No high-risk (household) exposure to COVID-19 within the last 14 days.   Risk factors for severe disease from COVID-19 infection    Higher risk for severe disease from COVID-19 because of BMI >= 25.   General presentation   Symptoms came on suddenly.   Fever:    No fever.   Sinus and nasal symptoms:    No sinus pain or pressure.    No nasal or sinus congestion.    No nasal discharge.    No itchy nose or sneezing.   Throat symptoms:    Sore throat. Has redness on the tonsils. No  "redness in the back of the throat.    Has had recent strep exposure.    Patient thinks they have strep.    Has pain when swallowing but can swallow liquids and solid foods.   Head and body aches:    No headache.    No sweats.    No chills.    No myalgia.    No fatigue.   Cough:    No cough.   Wheezing and shortness of breath:    No COPD diagnosis.    No asthma diagnosis.    No wheezing.    No shortness of breath.   Chest pain:    No chest pain.   Ear symptoms:    None.   Dizziness:    No dizziness.   Allergies:    No history of allergies.   Flu exposure:    No recent known exposure to a person with a confirmed flu diagnosis.    Has not had a flu vaccine this season.   Patient is taking over-the-counter medications for current symptoms, including fluticasone, guaifenesin/dextromethorphan, and ibuprofen.   Review of red flags/alarm symptoms:    No changes in alertness or awareness.    No symptoms suggesting airway obstruction.    No decreased urination.    No blue or gray coloring present in face, lips, or nail beds.    No swelling, pain, redness, or increased warmth in the calf or lower part of ONE leg only.    No proptosis.   Risk factors for antibiotic resistance:    Antibiotic use for similar symptoms within the last 30 days.   Pregnancy/menstrual status/breastfeeding:    Not pregnant.    Not breastfeeding.    Regarding date of last menstrual period, patient writes: Last Wednesday .   Self-exam:    Height: 5' 4\"    Weight: 180 lbs    Tonsillar edema.    Purulent tonsils.    No palatal petechiae.    Swollen/painful neck lymph nodes.   Recent antibiotic use:    Has taken antibiotics for similar symptoms within the past month. Patient specifies the antibiotics taken as Amoxicillin.   Current medications   Currently taking sertraline 100 MG tablet, tiZANidine 4 MG tablet, Rosy 3-0.03 MG per tablet, azelastine 0.1 % nasal spray, amitriptyline 75 MG tablet, and buPROPion  MG 24 hr tablet.   Medication allergies   " No relevant drug allergies.   Medication contraindication review   No history of anaphylactic reaction to beta-lactam antibiotics; aspirin triad; blood dyscrasia; bone marrow depression; catecholamine-releasing paraganglioma; coronary artery disease; coagulation disorder; congenital long QT syndrome; depression; electrolyte abnormalities; fungal infection; GI bleeding; GI obstruction; G6PD deficiency; heart arrhythmia; hypertension; mononucleosis; myasthenia; recent myocardial infarction; NSAID-induced asthma/urticaria; Parkinson's disease; pheochromocytoma; porphyria; Reye syndrome; seizure disorder; ulcerative colitis; and urinary retention.   No history of metoclopramide-associated dystonic reaction and tardive dyskinesia.   No known history of amoxicillin-clavulanate-associated cholestatic jaundice or hepatic impairment.   No known history of azithromycin-associated cholestatic jaundice or hepatic impairment.   Past medical history   Immune conditions:   No immunocompromising conditions.   No history of cancer.   Social history   Never smoked tobacco.   High-risk household contacts:    Household contact with diabetes.   Patient-submitted comments   Patient was asked if they had anything to add about their symptoms. Patient writes: Nephew had strep and I was around him when he had it. .   Patient did not request an excuse note.   Assessment:   Patient determined to need a level of care not appropriate to be delivered through eVisit.   Plan:   Patient informed of need to seek in-person care   ----------   Electronically signed by SEN Galloway on 2024-05-19 at 14:16PM   ----------   Patient Interview Transcript:   Which of these symptoms are bothering you? Select all that apply.    Sore throat   Not selected:    Cough    Shortness of breath    Stuffed-up nose or sinuses    Runny nose    Itchy or watery eyes    Itchy nose or sneezing    Loss of smell or taste    Hoarse voice or loss of voice    Headache    " Fever    Sweats    Chills    Muscle or body aches    Fatigue or tiredness    Nausea or vomiting    Diarrhea    I don't have any of these symptoms   When did your current symptoms start? Select one.    Less than 48 hours ago   Not selected:    3 to 5 days ago    6 to 9 days ago    10 to 14 days ago    2 to 3 weeks ago    3 to 4 weeks ago    More than a month ago   Did your symptoms come on suddenly or gradually? Select one.    Suddenly   Not selected:    Gradually    I'm not sure   Since your current symptoms started, have you been tested for COVID-19? This includes home self-tests as well as nose swab or saliva tests done at a doctor's office, lab, or testing site. Select one.    Yes   Not selected:    No   When was your most recent COVID-19 test? Select one.    Within the last 24 hours   Not selected:    Within the last week (specify date as MM/DD/YY)    7 to 14 days ago    15 to 30 days ago    More than 1 month ago   What was the result of your most recent COVID-19 test? Select one.    Negative   Not selected:    Positive   Has anyone in your household tested positive for COVID-19 in the past 14 days? Select one.    No   Not selected:    Yes   In the last 14 days, have you had close contact with someone who has COVID-19? \"Close contact\" means any of these: - Caring for someone with COVID-19. - Being within 6 feet of someone with COVID-19 for a total of at least 15 minutes over a 24-hour period. For example, three 5-minute exposures for a total of 15 minutes. - Being in direct contact with respiratory droplets from someone with COVID-19 (being coughed on, kissing, sharing utensils). Select one.    No, not that I know of   Not selected:    Yes, a confirmed case    Yes, a suspected case   Have you gotten the [id number] updated COVID-19 vaccine? This means either the updated Pfizer-BioNTm2fx or Moderna vaccine after September 12, 2023; or the updated Novavax vaccine after October 3, 2023. Select one.    No   Not " selected:    Yes   Since your symptoms started, have you felt dizzy? Select one.    No   Not selected:    Yes, but I can still do my regular daily activities    Yes, and it makes it hard to stand, walk, or do daily activities   Do you have chest pain? You might also feel it as discomfort, aching, tightness, or squeezing in the chest. Select one.    No   Not selected:    Yes   Do you feel sinus pain or pressure in any of these areas?    No   Not selected:    In my forehead    Around my eyes    Behind my nose    In my cheeks    In my upper teeth or jaw   Can you swallow liquids and solid foods? A sore throat may be painful when swallowing, but it shouldn't prevent you from swallowing. Select one.    Yes, but it's painful   Not selected:    Yes, with ease    Yes, but it's uncomfortable    It's hard to swallow anything because it feels like liquids and food get stuck in my throat    No, I can't swallow anything, liquid or solid foods   Is your throat pain worse on one side than the other? Select one.    No, it's the same on both sides   Not selected:    Yes, it's worse on the right side    Yes, it's worse on the left side   To recommend the best treatment, we need to see photos of your throat. You can have someone else take the photo, or use a mirror. If you choose not to send photos, you can still continue this interview, but your treatment options may be affected. Select one.    I'd rather not send photos   Not selected:    Someone can take the photos for me    I'll take the photos myself   Have you urinated at least 3 times in the last 24 hours? Select one.    Yes   Not selected:    No   Do your face, lips, or nail beds appear blue or gray? Select one.    No   Not selected:    Yes   Do you have any swelling, pain, redness, or increased warmth in the calf or lower part of ONE leg only? Select one.    No   Not selected:    Yes   Changes in alertness or awareness may mean you need emergency care. Since your symptoms  started, have you had any of these? Select all that apply.    None of the above   Not selected:    Confusion    Slurred speech    Not knowing where you are or what day it is    Difficulty staying conscious    Fainting or passing out   Do your symptoms include a whistling sound, or wheezing, when you breathe? Select one.    No   Not selected:    Yes   Since your symptoms started, have you noticed that one or both of your eyes is bulging or poking out? Select one.    No   Not selected:    Yes   Do you have any of these symptoms in your ear(s)? Select all that apply.    None of the above   Not selected:    Pain    Pressure    Fullness    Crackling or popping    Plugged or blocked sensation   Are your tonsils larger than usual?    Yes   Not selected:    No, not that I can tell    I've had my tonsils removed   Is there redness in the back of your throat or on your tonsils? Select all that apply.    Yes, on the tonsils   Not selected:    Yes, in the back of the throat    No, not that I can see   Is there any white or yellow pus on your tonsils?    Yes   Not selected:    No, not that I can see   Are there red spots on the roof of your mouth or the back of your throat?    No, not that I can see   Not selected:    Yes   Are your glands/lymph nodes swollen, or does it hurt when you touch them?    Yes   Not selected:    No, not that I can tell   In the past 2 weeks, has anyone around you (such as at school, work, or home) had a confirmed diagnosis of strep throat? A confirmed diagnosis means that a throat swab and lab test were done to verify a strep throat infection. Select one.    Yes   Not selected:    No, not that I know of   Do you think you might have strep throat? Select one.    Yes   Not selected:    No   In the past week, has anyone around you (such as at school, work, or home) had a confirmed diagnosis of the flu? A confirmed diagnosis means that a nose swab was done to verify a flu infection. Select all that  apply.    No, not that I know of   Not selected:    I live with someone who has the flu    I've been within touching distance of someone who has the flu    I've walked by, or sat about 3 feet away from, someone who has the flu    I've been in the same building as someone who has the flu   Have you ever been diagnosed with asthma? Select one.    No   Not selected:    Yes   Have you ever been diagnosed with chronic obstructive pulmonary disease (COPD)? Select one.    No, not that I know of   Not selected:    Yes   In the past month, have you taken antibiotics for similar symptoms? Examples of antibiotics include amoxicillin, amoxicillin-clavulanate (Augmentin), penicillin, cefdinir (Omnicef), doxycycline, and clindamycin (Cleocin). Select one.    Yes (specify): Amoxicillin   Not selected:    No   Do any of these apply to you? Select all that apply.    None of the above   Not selected:    I've been hospitalized within the last 5 days    I have diabetes    I'm in close contact with a child in    Do you have allergies (pollen, dust mites, mold, animal dander)? Select one.    No, not that I know of   Not selected:    Yes   Have you had a flu shot this season? Select one.    No   Not selected:    Yes, less than 2 weeks ago    Yes, 2 to 4 weeks ago    Yes, 1 to 3 months ago    Yes, 3 to 6 months ago    Yes, more than 6 months ago   Are you pregnant? Select one.    No   Not selected:    Yes   When was your last menstrual period? If you don't currently have periods or no longer have periods, please briefly explain.    __Last Wednesday __   Within the last 2 weeks, have you: - Given birth - Had a miscarriage - Had a pregnancy loss - Had an  Being postpartum (live birth or loss) within the last 2 weeks increases your risk of flu complications. Select one.    No   Not selected:    Yes   Are you breastfeeding? Select one.    No   Not selected:    Yes   The flu and COVID-19 can be more serious for people in certain  groups. The next few questions help us figure out if you or anyone you live with is at higher risk for complications from these infections. Do any of these statements apply to you? Select all that apply.    None of the above   Not selected:    I'm     I'm     I'm Black    I'm  or    Do you smoke tobacco? Select one.    No   Not selected:    Yes, every day    Yes, some days    No, I quit   Do you have a mostly inactive lifestyle? Answer yes if all of these are true: - You spend at least 6 hours a day sitting or lying down - You get less than 2 and a half hours per week of moderate exercise such as walking fast - You get less than 1 hour and 15 minutes per week of intense exercise such as jogging or running Select one.    No   Not selected:    Yes   Do you have any of these conditions? Select all that apply.    None of the above   Not selected:    Chronic lung disease, such as cystic fibrosis or interstitial fibrosis    Heart disease, such as congenital heart disease, congestive heart failure, or coronary artery disease    Disorder of the brain, spinal cord, or nerves and muscles, such as dementia, cerebral palsy, epilepsy, muscular dystrophy, or developmental delay    Metabolic disorder or mitochondrial disease    Cerebrovascular disease, such as stroke or another condition affecting the blood vessels or blood supply to the brain    Down syndrome    Mood disorder, including depression or schizophrenia spectrum disorders    Substance use disorder, such as alcohol, opioid, or cocaine use disorder    Tuberculosis    Primary immunodeficiency   Do you live in a group care setting? Examples include: - Nursing home - Residential care - Psychiatric treatment facility - Group home - Dormitory - Board and care home - Homeless shelter - Foster care setting Select one.    No   Not selected:    Yes   Are you a healthcare worker? Select one.    No   Not selected:    Yes   People with a  very high body mass index (BMI) are at higher risk for developing complications from the flu and severe illness from COVID-19. To determine your BMI, we need to know your weight and height. Please enter your weight (in pounds).    Weight   Please enter your height.    Height   Do you have any of these conditions that can affect the immune system? Scroll to see all options. Select all that apply.    None of these   Not selected:    History of bone marrow transplant    Chronic kidney disease    Chronic liver disease (including cirrhosis)    HIV/AIDS    Inflammatory bowel disease (Crohn's disease or ulcerative colitis)    Lupus    Moderate to severe plaque psoriasis    Multiple sclerosis    Rheumatoid arthritis    Sickle cell anemia    Alpha or beta thalassemia    History of kidney, liver, heart, or other solid organ transplant    History of liver, heart, or other solid organ transplant    History of spleen removal    An autoimmune disorder not listed here (specify)    A condition requiring treatment with long-term use of oral steroids (such as prednisone, prednisolone, or dexamethasone) (specify)   Have you ever been diagnosed with cancer? Select one.    No   Not selected:    Yes, I have cancer now    Yes, but I'm in remission   The flu and COVID-19 can be more serious for people in certain groups. Do any of these apply to the people who live with you? Select all that apply.    None of the above   Not selected:    Under age 5    Over age 65            Black     or     Pregnant    Has given birth, had a miscarriage, had a pregnancy loss, or had an  in the last 2 weeks   Does any member of your household have any of these medical conditions? Select all that apply.    Diabetes   Not selected:    Asthma    Disorders of the brain, spinal cord, or nerves and muscles, such as dementia, cerebral palsy, epilepsy, muscular dystrophy, or developmental delay    Chronic lung  disease, such as COPD or cystic fibrosis    Heart disease, such as congenital heart disease, congestive heart failure, or coronary artery disease    Cerebrovascular disease, such as stroke or another condition affecting the blood vessels or blood supply to the brain    Blood disorders, such as sickle cell disease    Metabolic disorders such as inherited metabolic disorders or mitochondrial disease    Kidney disorders    Liver disorders    Weakened immune system due to illness or medications such as chemotherapy or steroids    Children under the age of 19 who are on long-term aspirin therapy    Extreme obesity (BMI > 40)    None of the above   Do you have any of these conditions? Scroll to see all options. Select all that apply.    None of the above   Not selected:    Blockage or narrowing of the blood vessels of the heart    Blood dyscrasia, such anemia, leukemia, lymphoma, or myeloma    Bone marrow depression    Catecholamine-releasing paraganglioma    Congenital long QT syndrome    Depression    Difficulty urinating or completely emptying your bladder    Uncorrected electrolyte abnormalities    Fungal infection    Gastrointestinal (GI) bleeding    Gastrointestinal (GI) obstruction    Recent heart attack    High blood pressure    Irregular heartbeat or heart rhythm    Mononucleosis (mono)    Myasthenia gravis    Parkinson's disease    Pheochromocytoma    Reye syndrome    Seizure disorder    Thyroid disease    Ulcerative colitis   Have you ever had either of these conditions? Select all that apply.    No   Not selected:    Metoclopramide-associated dystonic reaction    Tardive dyskinesia   Just a few more questions about medications, and then you're finished. Have you used any non-prescription medications or nasal sprays for your current symptoms? Examples include saline sprays, decongestants, NyQuil, and Tylenol. Select one.    Yes   Not selected:    No   Which of these non-prescription medications have you tried?  Scroll to see all options. Select all that apply.    Fluticasone (Flonase)    Guaifenesin/dextromethorphan (Delsym DM, Mucinex DM, Robitussin DM)    Ibuprofen (Advil, Motrin, Midol)   Not selected:    Acetaminophen (Tylenol)    Budesonide (Rhinocort)    Cetirizine (Zyrtec)    Chlorpheniramine (Aller-chlor, Chlor-Trimeton)    Cromolyn (NasalCrom)    Dextromethorphan (Delsym, Robitussin, Vicks DayQuil Cough)    Diphenhydramine (Benadryl)    Fexofenadine (Allegra)    Guaifenesin (Mucinex)    Ketotifen (Alaway, Zaditor)    Loratadine (Alavert, Claritin)    Naphazoline-pheniramine (Naphcon-A, Opcon-A, Visine-A)    Omeprazole (Prilosec)    Oxymetazoline (Afrin)    Triamcinolone (Nasacort)    None of the above   Have you taken any monoamine oxidase inhibitor (MAOI) medications in the last 14 days? Examples include rasagiline (Azilect), selegiline (Eldepryl, Zelapar), isocarboxazid (Marplan), phenelzine (Nardil), and tranylcypromine (Parnate). Select one.    No, not that I know of   Not selected:    Yes   Do you take Kynmobi or Apokyn (apomorphine)? Select one.    No   Not selected:    Yes   Are you still taking these medications listed in your medical record? If you're not taking any of these, click Next. Select all that apply.    sertraline 100 MG tablet    tiZANidine 4 MG tablet    Rosy 3-0.03 MG per tablet    azelastine 0.1 % nasal spray    amitriptyline 75 MG tablet    buPROPion  MG 24 hr tablet   Are you taking any other medications, vitamins, or supplements? Select one.    No   Not selected:    Yes   Have you ever had an allergic or bad reaction to any medication? Select one.    Yes   Not selected:    No   Have you had an allergic or bad reaction to any of these medications? Select all that apply.    No, not that I know of   Not selected:    Baloxavir (Xofluza)    Benzonatate (Tessalon Perles)    Fluconazole, itraconazole, or terconazole (brands include Diflucan, Sporanox, Terazol)    Oseltamivir (Tamiflu) or  "zanamivir (Relenza)    Paxlovid, nirmatrelvir, or ritonavir (Norvir)   Have you had an allergic or bad reaction to any of these antibiotic medications? Select all that apply.    No, not that I know of   Not selected:    Penicillin or any \"-cillin\" antibiotic, such as amoxicillin, ampicillin, dicloxacillin, nafcillin, or piperacillin (Brands include Augmentin, Unasyn, and Zosyn)    Tetracycline or any \"-cycline\" antibiotic, such as doxycycline, demeclocycline, minocycline (Brands include Declomycin, Doryx, Dynacin, Oracea, Monodox, Panmycin, and Vibramycin)    Ciprofloxacin or any \"-floxacin\" antibiotic, such as gemifloxacin, levofloxacin, moxifloxacin, or ofloxacin (Brands include Factive, Cipro, Floxin, and Levaquin)    Cephalexin or any \"cef-\" antibiotic, such as cefazolin, cefdinir, cefuroxime, ceftriaxone, ceftazidime, or cefepime (Brands include Ancef, Ceftin, Fortaz, Keflex, Maxipime, Rocephin, and Simplicef)    Azithromycin or any \"-thromycin\" antibiotic, such as erythromycin or clarithromycin (Brands include Biaxin, Erythrocin, Z-patricia, and Zithromax)    Clindamycin or lincomycin (Brands include Cleocin and Lincocin)   Have you had an allergic or bad reaction to any of these medications? Select all that apply.    No, not that I know of   Not selected:    Albuterol or a similar medication    Atropine    Corticosteroid (steroid) medication, including topical steroids, inhaled steroids, nasal steroids, or oral steroids (budesonide, ciclesonide, dexamethasone, flunisolide, fluticasone, methylprednisolone, triamcinolone, prednisone (or brand names Alvesco, Deltasone, Flovent, Medrol, Nasacort, Rhinocort, or Veramyst)    Metoclopramide (Reglan)    Ondansetron (Zuplenz, Zofran ODT, Zofran)    Prochlorperazine (Compazine)   Have you had an allergic or bad reaction to any of these eye drops, nasal sprays, or inhalers? Scroll to see all options. Select all that apply.    No, not that I know of   Not selected:   "  Azelastine (Astelin, Astepro, Optivar)    Cromolyn (Crolom, NasalCrom)    Ipratropium (Atrovent)    Ketotifen (Alaway, Zaditor)    Pheniramine/naphazoline (Naphcon-A, Opcon-A, Visine-A)    Olopatadine (Pataday, Patanol, Pazeo)   Have you had an allergic or bad reaction to any of these non-prescription medications? Scroll to see all options. Select all that apply.    No, not that I know of   Not selected:    Acetaminophen (Tylenol)    Cetirizine (Zyrtec)    Dextromethorphan (Delsym, Robitussin, Vicks DayQuil Cough)    Diphenhydramine (Benadryl)    Fexofenadine (Allegra)    Guaifenesin (Mucinex)    Dextromethorphan (Delsym)    Ibuprofen (Advil, Motrin, Midol)    Loratadine (Alavert, Claritin)    Oxymetazoline (Afrin)    Pseudoephedrine (Sudafed)   Are you allergic to milk or to the proteins found in milk (for example, whey or casein)? A milk allergy is different from lactose intolerance. Select one.    No, not that I know of   Not selected:    Yes   Have you ever had jaundice or liver problems as a result of taking amoxicillin-clavulanate (Augmentin)? Jaundice is a condition in which the skin and the whites of the eyes turn yellow. Select all that apply.    No, not that I know of   Not selected:    Yes, jaundice    Yes, liver problems   Have you ever had jaundice or liver problems as a result of taking azithromycin (Zithromax, Zmax)? Jaundice is a condition in which the skin and the whites of the eyes turn yellow. Select all that apply.    No, not that I know of   Not selected:    Yes, jaundice    Yes, liver problems   Do you need a doctor's note? A doctor's note confirms that you received care today and states when you can return to school or work. It does not contain information about your diagnosis or treatment plan. Your provider will make the final decision on whether to give you a doctor's note and for how long. Doctor's notes CANNOT be backdated. We can't provide medical leave paperwork through this type of  visit. If more paperwork is needed to request time off, contact your primary care provider. Select one.    No   Not selected:    Today only (1 day)    Today and tomorrow (2 days)    3 days    5 days    7 days   Is there anything you'd like to add about your symptoms? Please limit your comments to the symptoms covered in this interview. If you include comments about other concerns, your provider may recommend that you be seen in person.    __Nephew had strep and I was around him when he had it. __   ----------   Medical history   Medical history data does not currently exist for this patient.

## 2024-05-19 NOTE — E-VISIT ESCALATED
Patient escalated   Provider SEN Sandoval chose to escalate patient to another level of care because: Desired treatments not available   Additional Details: Since you have taken antibiotics twice since 4/6/2024 for similar symptoms, you need to go in for an in person exam.   Patient was sent the following message:   Based on the information you've provided us, you need to take another step to get care.   What to do now:   Urgent Care   You should be seen within the next 24 hours.   Please go to a walk-in clinic or urgent care, or make an appointment to be seen within the next 24 hours.   You won't be charged for your eVisit. If you paid with a credit card, the charge will be reversed.   Chief Complaint: Cold, flu, COVID, sinus, hay fever, or seasonal allergies   Patient introduction   Patient is 23-year-old female with congestion, sore throat, voice hoarseness, and headache that started less than 48 hours ago.   COVID-19 testing history, vaccination status, and exposure:    Patient was tested for COVID-19 within the last week. Patient specifies date of test as 5/17/24. Test result was negative.    Patient took a self-test during the interview. Test result was negative.    Has not received an updated [id number] COVID-19 vaccination (Pfizer-BioNTech or Moderna vaccine after September 12, 2023; or Novavax vaccine after October 3, 2023).    No known exposure to a person with a confirmed or suspected case of COVID-19.    No high-risk (household) exposure to COVID-19 within the last 14 days.   Risk factors for severe disease from COVID-19 infection    Higher risk for severe disease from COVID-19 because of BMI >= 25.   General presentation   Symptoms came on suddenly.   Fever:    No fever.   Sinus and nasal symptoms:    Yellow nasal drainage.    Nasal drainage is thick.    Postnasal drip.    Sinus pain or pressure on or around the forehead and upper teeth or jaw.    Patient first noticed sinus pain less  than 5 days ago.    Sinus pain is worse with Valsalva.    No nasal discharge.    No itchy nose or sneezing.    No history of unhealed nasal septal ulcer/nasal wound.    No history of antibiotic treatment for sinus infection in the last year.    No history of deviated septum or nasal polyps.   Throat symptoms:    Sore throat. Pain is bilateral but worse on the right side. Has redness in the back of the throat. No redness on the tonsils.    Has discomfort when swallowing but can swallow liquids and solid foods.    Voice is mildly hoarse. Patient does not believe hoarseness is due to voice strain.   Head and body aches:    Headache described as moderate (4 to 6 on a scale of 1 to 10).    No sweats.    No chills.    No myalgia.    No fatigue.   Cough:    No cough.   Wheezing and shortness of breath:    No COPD diagnosis.    No asthma diagnosis.    No wheezing.    No shortness of breath.   Chest pain:    No chest pain.   Ear symptoms:    Current symptoms include pressure in the ear(s).   Dizziness:    No dizziness.   Allergies:    Patient has known seasonal allergies.    Patient does not think symptoms are allergy-related.   Flu exposure:    No recent known exposure to a person with a confirmed flu diagnosis.    Has not had a flu vaccine this season.   Patient is taking over-the-counter medications for current symptoms, including cetirizine, diphenhydramine, fluticasone, and ibuprofen.   Review of red flags/alarm symptoms:    No changes in alertness or awareness.    No symptoms suggesting airway obstruction.    No symptoms suggesting intracranial hemorrhage.    No decreased urination.    No blue or gray coloring present in face, lips, or nail beds.    No swelling, pain, redness, or increased warmth in the calf or lower part of ONE leg only.    No proptosis.   Risk factors for antibiotic resistance:    Antibiotic use for similar symptoms within the last 30 days.   Pregnancy/menstrual status/breastfeeding:    Not  "pregnant.    Not breastfeeding.    Regarding date of last menstrual period, patient writes: 5/15/24.   Self-exam:    Height: 5' 4\"    Weight: 180 lbs    Tonsillar edema.    Tonsils appear normal.    No palatal petechiae.    Swollen/painful neck lymph nodes.   Recent antibiotic use:    Has taken antibiotics for similar symptoms within the past month. Patient specifies the antibiotics taken as Amoxicillin.   Current medications   Currently taking sertraline 100 MG tablet, tiZANidine 4 MG tablet, Rosy 3-0.03 MG per tablet, azelastine 0.1 % nasal spray, amitriptyline 75 MG tablet, buPROPion  MG 24 hr tablet, and eletriptan 20 MG tablet.   Medication allergies   No relevant drug allergies.   Medication contraindication review   No history of anaphylactic reaction to beta-lactam antibiotics; aspirin triad; blood dyscrasia; bone marrow depression; catecholamine-releasing paraganglioma; coronary artery disease; coagulation disorder; congenital long QT syndrome; depression; electrolyte abnormalities; fungal infection; GI bleeding; GI obstruction; G6PD deficiency; heart arrhythmia; hypertension; mononucleosis; myasthenia; recent myocardial infarction; NSAID-induced asthma/urticaria; Parkinson's disease; pheochromocytoma; porphyria; Reye syndrome; seizure disorder; ulcerative colitis; and urinary retention.   No history of metoclopramide-associated dystonic reaction and tardive dyskinesia.   No known history of amoxicillin-clavulanate-associated cholestatic jaundice or hepatic impairment.   No known history of azithromycin-associated cholestatic jaundice or hepatic impairment.   Past medical history   Immune conditions:   No immunocompromising conditions.   No history of cancer.   Social history   Never smoked tobacco.   High-risk household contacts:    Household contact with diabetes.   Patient-submitted comments   Patient was asked if they had anything to add about their symptoms. Patient writes: Amoxicillin usually " helps the throat .   Patient did not request an excuse note.   Assessment:   Patient determined to need a level of care not appropriate to be delivered through eVisit.   Plan:   Patient informed of need to seek in-person care   ----------   Electronically signed by SEN Sandoval on 2024-05-19 at 13:20PM   ----------   Patient Interview Transcript:   Which of these symptoms are bothering you? Select all that apply.    Stuffed-up nose or sinuses    Sore throat    Hoarse voice or loss of voice    Headache   Not selected:    Cough    Shortness of breath    Runny nose    Itchy or watery eyes    Itchy nose or sneezing    Loss of smell or taste    Fever    Sweats    Chills    Muscle or body aches    Fatigue or tiredness    Nausea or vomiting    Diarrhea    I don't have any of these symptoms   When did your current symptoms start? Select one.    Less than 48 hours ago   Not selected:    3 to 5 days ago    6 to 9 days ago    10 to 14 days ago    2 to 3 weeks ago    3 to 4 weeks ago    More than a month ago   Did your symptoms come on suddenly or gradually? Select one.    Suddenly   Not selected:    Gradually    I'm not sure   Since your current symptoms started, have you been tested for COVID-19? This includes home self-tests as well as nose swab or saliva tests done at a doctor's office, lab, or testing site. Select one.    Yes   Not selected:    No   When was your most recent COVID-19 test? Select one.    Within the last week (specify date as MM/DD/YY): 5/17/24   Not selected:    Within the last 24 hours    7 to 14 days ago    15 to 30 days ago    More than 1 month ago   What was the result of your most recent COVID-19 test? Select one.    Negative   Not selected:    Positive   Even though your last test was negative, you could still have COVID. You may have tested before the virus was detectable. In some cases, repeat testing over several days is needed. - If you have a COVID test kit, take the test now before  "continuing this interview. - If you choose not to take a test or don't have one, you should still continue this interview. Your provider can still help you get care. Do you have a COVID test kit? Select one.    Yes, and I'll take another test now   Not selected:    Yes, but I prefer not to take a test now    No, I don't have a test kit   What is the result of the COVID-19 home test you just took? Select one.    Negative   Not selected:    Positive   Has anyone in your household tested positive for COVID-19 in the past 14 days? Select one.    No   Not selected:    Yes   In the last 14 days, have you had close contact with someone who has COVID-19? \"Close contact\" means any of these: - Caring for someone with COVID-19. - Being within 6 feet of someone with COVID-19 for a total of at least 15 minutes over a 24-hour period. For example, three 5-minute exposures for a total of 15 minutes. - Being in direct contact with respiratory droplets from someone with COVID-19 (being coughed on, kissing, sharing utensils). Select one.    No, not that I know of   Not selected:    Yes, a confirmed case    Yes, a suspected case   Have you gotten the [id number] updated COVID-19 vaccine? This means either the updated Pfizer-BioNTech or Moderna vaccine after September 12, 2023; or the updated Novavax vaccine after October 3, 2023. Select one.    No   Not selected:    Yes   Since your symptoms started, have you felt dizzy? Select one.    No   Not selected:    Yes, but I can still do my regular daily activities    Yes, and it makes it hard to stand, walk, or do daily activities   Do you have chest pain? You might also feel it as discomfort, aching, tightness, or squeezing in the chest. Select one.    No   Not selected:    Yes   You mentioned having a headache. On a scale of 1 to 10, how severe is your headache pain? Select one.    Moderate (4 to 6)   Not selected:    Mild (1 to 3)    Severe (7 to 9)    Unbearable (10)    The worst headache " "of my life (10+)   Do you feel sinus pain or pressure in any of these areas?    In my forehead    In my upper teeth or jaw   Not selected:    Around my eyes    Behind my nose    In my cheeks    No   When did you first notice your sinus pain or pressure? Select one.    Less than 5 days ago   Not selected:    5 to 9 days ago    10 to 14 days ago    2 to 4 weeks ago    1 month ago or longer   Does coughing, sneezing, or leaning forward make your sinuses feel worse? Select one.    Yes   Not selected:    No   What color is your nasal drainage? Select one.    Yellow or yellowish   Not selected:    Clear    White    Green or greenish    My nose is stuffed but not draining or running   Is your nasal drainage thick or thin? Select one.    Thick   Not selected:    Thin   Is there any drainage (mucus) going down the back of your throat? This kind of drainage is also called \"postnasal drip.\" It can cause frequent throat clearing. Select one.    Yes   Not selected:    No, not that I know of   Can you swallow liquids and solid foods? A sore throat may be painful when swallowing, but it shouldn't prevent you from swallowing. Select one.    Yes, but it's uncomfortable   Not selected:    Yes, with ease    Yes, but it's painful    It's hard to swallow anything because it feels like liquids and food get stuck in my throat    No, I can't swallow anything, liquid or solid foods   Is your throat pain worse on one side than the other? Select one.    Yes, it's worse on the right side   Not selected:    Yes, it's worse on the left side    No, it's the same on both sides   Which of these best describes your throat pain? Select one.    Pain on both sides, but worse on the right   Not selected:    Severe pain on the right, and little to no pain on the left   To recommend the best treatment, we need to see photos of your throat. You can have someone else take the photo, or use a mirror. If you choose not to send photos, you can still continue " this interview, but your treatment options may be affected. Select one.    I'd rather not send photos   Not selected:    Someone can take the photos for me    I'll take the photos myself   Have you urinated at least 3 times in the last 24 hours? Select one.    Yes   Not selected:    No   Do your face, lips, or nail beds appear blue or gray? Select one.    No   Not selected:    Yes   Do you have any swelling, pain, redness, or increased warmth in the calf or lower part of ONE leg only? Select one.    No   Not selected:    Yes   Changes in alertness or awareness may mean you need emergency care. Since your symptoms started, have you had any of these? Select all that apply.    None of the above   Not selected:    Confusion    Slurred speech    Not knowing where you are or what day it is    Difficulty staying conscious    Fainting or passing out   Do your symptoms include a whistling sound, or wheezing, when you breathe? Select one.    No   Not selected:    Yes   Early in this interview, you told us you were hoarse or you'd lost your voice. How would you describe the changes to your voice? Select one.    It just sounds a little raspy   Not selected:    It's harder than usual to talk    I can barely talk at all   Is it possible that you strained your voice? Singing, yelling, or talking more or louder than usual can cause voice strain. Select one.    No, not that I know of   Not selected:    Yes   Since your symptoms started, have you noticed that one or both of your eyes is bulging or poking out? Select one.    No   Not selected:    Yes   Do you have any of these symptoms in your ear(s)? Select all that apply.    Pressure   Not selected:    Pain    Fullness    Crackling or popping    Plugged or blocked sensation    None of the above   Are your tonsils larger than usual?    Yes   Not selected:    No, not that I can tell    I've had my tonsils removed   Is there redness in the back of your throat or on your tonsils? Select  all that apply.    Yes, in the back of the throat   Not selected:    Yes, on the tonsils    No, not that I can see   Is there any white or yellow pus on your tonsils?    No, not that I can see   Not selected:    Yes   Are there red spots on the roof of your mouth or the back of your throat?    No, not that I can see   Not selected:    Yes   Are your glands/lymph nodes swollen, or does it hurt when you touch them?    Yes   Not selected:    No, not that I can tell   In the past week, has anyone around you (such as at school, work, or home) had a confirmed diagnosis of the flu? A confirmed diagnosis means that a nose swab was done to verify a flu infection. Select all that apply.    No, not that I know of   Not selected:    I live with someone who has the flu    I've been within touching distance of someone who has the flu    I've walked by, or sat about 3 feet away from, someone who has the flu    I've been in the same building as someone who has the flu   Have you ever been diagnosed with asthma? Select one.    No   Not selected:    Yes   Have you ever been diagnosed with chronic obstructive pulmonary disease (COPD)? Select one.    No, not that I know of   Not selected:    Yes   In the past month, have you taken antibiotics for similar symptoms? Examples of antibiotics include amoxicillin, amoxicillin-clavulanate (Augmentin), penicillin, cefdinir (Omnicef), doxycycline, and clindamycin (Cleocin). Select one.    Yes (specify): Amoxicillin   Not selected:    No   In the last year, how many times were you treated with antibiotics for a sinus infection? Select one.    None   Not selected:    1 to 3 times    4 or more times   Do any of these apply to you? Select all that apply.    None of the above   Not selected:    I've been hospitalized within the last 5 days    I have diabetes    I'm in close contact with a child in    Have you been diagnosed with a deviated septum or nasal polyps? The nose is divided into two  nostrils by the septum. A crooked septum is called a deviated septum. Nasal polyps are growths inside the nose or sinuses. Select one.    No, not that I know of   Not selected:    Yes, but I had surgery to treat them    Yes, I have a deviated septum    Yes, I have nasal polyps    Yes, I have a deviated septum and nasal polyps   Do you have a sore inside your nose that won't heal? Select one.    No, not that I know of   Not selected:    Yes   Do you have allergies (pollen, dust mites, mold, animal dander)? Select one.    Yes   Not selected:    No, not that I know of   What kind of allergies do you have? Select all that apply.    Seasonal allergies (hay fever)   Not selected:    Perennial, or year-round, allergies (hay fever)    Pet allergies    Dust allergies    None of the above    I'm not sure   Do you think your symptoms could be allergy-related? Select one.    No   Not selected:    Yes    I'm not sure   Have you had a flu shot this season? Select one.    No   Not selected:    Yes, less than 2 weeks ago    Yes, 2 to 4 weeks ago    Yes, 1 to 3 months ago    Yes, 3 to 6 months ago    Yes, more than 6 months ago   Are you pregnant? Select one.    No   Not selected:    Yes   When was your last menstrual period? If you don't currently have periods or no longer have periods, please briefly explain.    5/15/24   Within the last 2 weeks, have you: - Given birth - Had a miscarriage - Had a pregnancy loss - Had an  Being postpartum (live birth or loss) within the last 2 weeks increases your risk of flu complications. Select one.    No   Not selected:    Yes   Are you breastfeeding? Select one.    No   Not selected:    Yes   The flu and COVID-19 can be more serious for people in certain groups. The next few questions help us figure out if you or anyone you live with is at higher risk for complications from these infections. Do any of these statements apply to you? Select all that apply.    None of the above   Not  selected:    I'm     I'm     I'm Black    I'm  or    Do you smoke tobacco? Select one.    No   Not selected:    Yes, every day    Yes, some days    No, I quit   Do you have a mostly inactive lifestyle? Answer yes if all of these are true: - You spend at least 6 hours a day sitting or lying down - You get less than 2 and a half hours per week of moderate exercise such as walking fast - You get less than 1 hour and 15 minutes per week of intense exercise such as jogging or running Select one.    No   Not selected:    Yes   Do you have any of these conditions? Select all that apply.    None of the above   Not selected:    Chronic lung disease, such as cystic fibrosis or interstitial fibrosis    Heart disease, such as congenital heart disease, congestive heart failure, or coronary artery disease    Disorder of the brain, spinal cord, or nerves and muscles, such as dementia, cerebral palsy, epilepsy, muscular dystrophy, or developmental delay    Metabolic disorder or mitochondrial disease    Cerebrovascular disease, such as stroke or another condition affecting the blood vessels or blood supply to the brain    Down syndrome    Mood disorder, including depression or schizophrenia spectrum disorders    Substance use disorder, such as alcohol, opioid, or cocaine use disorder    Tuberculosis    Primary immunodeficiency   Do you live in a group care setting? Examples include: - Nursing home - Residential care - Psychiatric treatment facility - Group home - Dormitory - Board and care home - Homeless shelter - Foster care setting Select one.    No   Not selected:    Yes   Are you a healthcare worker? Select one.    No   Not selected:    Yes   People with a very high body mass index (BMI) are at higher risk for developing complications from the flu and severe illness from COVID-19. To determine your BMI, we need to know your weight and height. Please enter your weight (in pounds).     Weight   Please enter your height.    Height   Do you have any of these conditions that can affect the immune system? Scroll to see all options. Select all that apply.    None of these   Not selected:    History of bone marrow transplant    Chronic kidney disease    Chronic liver disease (including cirrhosis)    HIV/AIDS    Inflammatory bowel disease (Crohn's disease or ulcerative colitis)    Lupus    Moderate to severe plaque psoriasis    Multiple sclerosis    Rheumatoid arthritis    Sickle cell anemia    Alpha or beta thalassemia    History of kidney, liver, heart, or other solid organ transplant    History of liver, heart, or other solid organ transplant    History of spleen removal    An autoimmune disorder not listed here (specify)    A condition requiring treatment with long-term use of oral steroids (such as prednisone, prednisolone, or dexamethasone) (specify)   Have you ever been diagnosed with cancer? Select one.    No   Not selected:    Yes, I have cancer now    Yes, but I'm in remission   The flu and COVID-19 can be more serious for people in certain groups. Do any of these apply to the people who live with you? Select all that apply.    None of the above   Not selected:    Under age 5    Over age 65            Black     or     Pregnant    Has given birth, had a miscarriage, had a pregnancy loss, or had an  in the last 2 weeks   Does any member of your household have any of these medical conditions? Select all that apply.    Diabetes   Not selected:    Asthma    Disorders of the brain, spinal cord, or nerves and muscles, such as dementia, cerebral palsy, epilepsy, muscular dystrophy, or developmental delay    Chronic lung disease, such as COPD or cystic fibrosis    Heart disease, such as congenital heart disease, congestive heart failure, or coronary artery disease    Cerebrovascular disease, such as stroke or another condition affecting the blood  vessels or blood supply to the brain    Blood disorders, such as sickle cell disease    Metabolic disorders such as inherited metabolic disorders or mitochondrial disease    Kidney disorders    Liver disorders    Weakened immune system due to illness or medications such as chemotherapy or steroids    Children under the age of 19 who are on long-term aspirin therapy    Extreme obesity (BMI > 40)    None of the above   Do you have any of these conditions? Scroll to see all options. Select all that apply.    None of the above   Not selected:    Blockage or narrowing of the blood vessels of the heart    Blood dyscrasia, such anemia, leukemia, lymphoma, or myeloma    Bone marrow depression    Catecholamine-releasing paraganglioma    Congenital long QT syndrome    Depression    Difficulty urinating or completely emptying your bladder    Uncorrected electrolyte abnormalities    Fungal infection    Gastrointestinal (GI) bleeding    Gastrointestinal (GI) obstruction    Recent heart attack    High blood pressure    Irregular heartbeat or heart rhythm    Mononucleosis (mono)    Myasthenia gravis    Parkinson's disease    Pheochromocytoma    Reye syndrome    Seizure disorder    Thyroid disease    Ulcerative colitis   Have you ever had either of these conditions? Select all that apply.    No   Not selected:    Metoclopramide-associated dystonic reaction    Tardive dyskinesia   Just a few more questions about medications, and then you're finished. Have you used any non-prescription medications or nasal sprays for your current symptoms? Examples include saline sprays, decongestants, NyQuil, and Tylenol. Select one.    Yes   Not selected:    No   Which of these non-prescription medications have you tried? Scroll to see all options. Select all that apply.    Cetirizine (Zyrtec)    Diphenhydramine (Benadryl)    Fluticasone (Flonase)    Ibuprofen (Advil, Motrin, Midol)   Not selected:    Acetaminophen (Tylenol)    Budesonide  (Rhinocort)    Chlorpheniramine (Aller-chlor, Chlor-Trimeton)    Cromolyn (NasalCrom)    Dextromethorphan (Delsym, Robitussin, Vicks DayQuil Cough)    Fexofenadine (Allegra)    Guaifenesin (Mucinex)    Guaifenesin/dextromethorphan (Delsym DM, Mucinex DM, Robitussin DM)    Ketotifen (Alaway, Zaditor)    Loratadine (Alavert, Claritin)    Naphazoline-pheniramine (Naphcon-A, Opcon-A, Visine-A)    Omeprazole (Prilosec)    Oxymetazoline (Afrin)    Triamcinolone (Nasacort)    None of the above   Have you taken any monoamine oxidase inhibitor (MAOI) medications in the last 14 days? Examples include rasagiline (Azilect), selegiline (Eldepryl, Zelapar), isocarboxazid (Marplan), phenelzine (Nardil), and tranylcypromine (Parnate). Select one.    No, not that I know of   Not selected:    Yes   Do you take Kynmobi or Apokyn (apomorphine)? Select one.    No   Not selected:    Yes   Are you still taking these medications listed in your medical record? If you're not taking any of these, click Next. Select all that apply.    sertraline 100 MG tablet    tiZANidine 4 MG tablet    Rosy 3-0.03 MG per tablet    azelastine 0.1 % nasal spray    amitriptyline 75 MG tablet    buPROPion  MG 24 hr tablet    eletriptan 20 MG tablet   Are you taking any other medications, vitamins, or supplements? Select one.    No   Not selected:    Yes   Have you ever had an allergic or bad reaction to any medication? Select one.    Yes   Not selected:    No   Have you had an allergic or bad reaction to any of these medications? Select all that apply.    No, not that I know of   Not selected:    Baloxavir (Xofluza)    Benzonatate (Tessalon Perles)    Fluconazole, itraconazole, or terconazole (brands include Diflucan, Sporanox, Terazol)    Oseltamivir (Tamiflu) or zanamivir (Relenza)    Paxlovid, nirmatrelvir, or ritonavir (Norvir)   Have you had an allergic or bad reaction to any of these antibiotic medications? Select all that apply.    No, not that I  "know of   Not selected:    Penicillin or any \"-cillin\" antibiotic, such as amoxicillin, ampicillin, dicloxacillin, nafcillin, or piperacillin (Brands include Augmentin, Unasyn, and Zosyn)    Tetracycline or any \"-cycline\" antibiotic, such as doxycycline, demeclocycline, minocycline (Brands include Declomycin, Doryx, Dynacin, Oracea, Monodox, Panmycin, and Vibramycin)    Ciprofloxacin or any \"-floxacin\" antibiotic, such as gemifloxacin, levofloxacin, moxifloxacin, or ofloxacin (Brands include Factive, Cipro, Floxin, and Levaquin)    Cephalexin or any \"cef-\" antibiotic, such as cefazolin, cefdinir, cefuroxime, ceftriaxone, ceftazidime, or cefepime (Brands include Ancef, Ceftin, Fortaz, Keflex, Maxipime, Rocephin, and Simplicef)    Azithromycin or any \"-thromycin\" antibiotic, such as erythromycin or clarithromycin (Brands include Biaxin, Erythrocin, Z-patricia, and Zithromax)    Clindamycin or lincomycin (Brands include Cleocin and Lincocin)   Have you had an allergic or bad reaction to any of these medications? Select all that apply.    No, not that I know of   Not selected:    Albuterol or a similar medication    Atropine    Corticosteroid (steroid) medication, including topical steroids, inhaled steroids, nasal steroids, or oral steroids (budesonide, ciclesonide, dexamethasone, flunisolide, fluticasone, methylprednisolone, triamcinolone, prednisone (or brand names Alvesco, Deltasone, Flovent, Medrol, Nasacort, Rhinocort, or Veramyst)    Metoclopramide (Reglan)    Ondansetron (Zuplenz, Zofran ODT, Zofran)    Prochlorperazine (Compazine)   Have you had an allergic or bad reaction to any of these eye drops, nasal sprays, or inhalers? Scroll to see all options. Select all that apply.    No, not that I know of   Not selected:    Azelastine (Astelin, Astepro, Optivar)    Cromolyn (Crolom, NasalCrom)    Ipratropium (Atrovent)    Ketotifen (Alaway, Zaditor)    Pheniramine/naphazoline (Naphcon-A, Opcon-A, Visine-A)    Olopatadine " (Farideh, Patmack, Pazeo)   Have you had an allergic or bad reaction to any of these non-prescription medications? Scroll to see all options. Select all that apply.    No, not that I know of   Not selected:    Acetaminophen (Tylenol)    Cetirizine (Zyrtec)    Dextromethorphan (Delsym, Robitussin, Vicks DayQuil Cough)    Diphenhydramine (Benadryl)    Fexofenadine (Allegra)    Guaifenesin (Mucinex)    Dextromethorphan (Delsym)    Ibuprofen (Advil, Motrin, Midol)    Loratadine (Alavert, Claritin)    Oxymetazoline (Afrin)    Pseudoephedrine (Sudafed)   Are you allergic to milk or to the proteins found in milk (for example, whey or casein)? A milk allergy is different from lactose intolerance. Select one.    No, not that I know of   Not selected:    Yes   Have you ever had jaundice or liver problems as a result of taking amoxicillin-clavulanate (Augmentin)? Jaundice is a condition in which the skin and the whites of the eyes turn yellow. Select all that apply.    No, not that I know of   Not selected:    Yes, jaundice    Yes, liver problems   Have you ever had jaundice or liver problems as a result of taking azithromycin (Zithromax, Zmax)? Jaundice is a condition in which the skin and the whites of the eyes turn yellow. Select all that apply.    No, not that I know of   Not selected:    Yes, jaundice    Yes, liver problems   Do you need a doctor's note? A doctor's note confirms that you received care today and states when you can return to school or work. It does not contain information about your diagnosis or treatment plan. Your provider will make the final decision on whether to give you a doctor's note and for how long. Doctor's notes CANNOT be backdated. We can't provide medical leave paperwork through this type of visit. If more paperwork is needed to request time off, contact your primary care provider. Select one.    No   Not selected:    Today only (1 day)    Today and tomorrow (2 days)    3 days    5 days    7  days   Is there anything you'd like to add about your symptoms? Please limit your comments to the symptoms covered in this interview. If you include comments about other concerns, your provider may recommend that you be seen in person.    __Amoxicillin usually helps the throat __   ----------   Medical history   Medical history data does not currently exist for this patient.

## 2024-05-19 NOTE — E-VISIT TREATED
Chief Complaint: Cold, flu, COVID, sinus, hay fever, or seasonal allergies   Patient introduction   Patient is 23-year-old female with sore throat that started 3 to 5 days ago. Regarding date of symptom onset, patient writes: 05/16/24.   COVID-19 testing history, vaccination status, and exposure:    Patient was tested for COVID-19 within the last 24 hours. Test result was negative.    Has not received an updated [id number] COVID-19 vaccination (Pfizer-BioNTech or Moderna vaccine after September 12, 2023; or Novavax vaccine after October 3, 2023).    No known exposure to a person with a confirmed or suspected case of COVID-19.    No high-risk (household) exposure to COVID-19 within the last 14 days.   Risk factors for severe disease from COVID-19 infection    Higher risk for severe disease from COVID-19 because of BMI >= 25.   General presentation   Symptoms came on gradually.   Fever:    No fever.   Sinus and nasal symptoms:    No sinus pain or pressure.    No nasal or sinus congestion.    No nasal discharge.    No itchy nose or sneezing.   Throat symptoms:    Sore throat. Pain is bilateral but worse on the right side. Has redness on the tonsils. No redness in the back of the throat.    Has had recent strep exposure.    Patient thinks they have strep.    Has pain when swallowing but can swallow liquids and solid foods.   Head and body aches:    No headache.    No sweats.    No chills.    No myalgia.    No fatigue.   Cough:    No cough.   Wheezing and shortness of breath:    No COPD diagnosis.    No asthma diagnosis.    No wheezing.    No shortness of breath.   Chest pain:    No chest pain.   Ear symptoms:    Current symptoms include pressure in the ear(s).   Dizziness:    No dizziness.   Allergies:    No history of allergies.   Flu exposure:    No recent known exposure to a person with a confirmed flu diagnosis.    Has not had a flu vaccine this season.   Not taking any over-the-counter medications for current  "symptoms.   Review of red flags/alarm symptoms:    No changes in alertness or awareness.    No symptoms suggesting airway obstruction.    No decreased urination.    No blue or gray coloring present in face, lips, or nail beds.    No swelling, pain, redness, or increased warmth in the calf or lower part of ONE leg only.    No proptosis.   Pregnancy/menstrual status/breastfeeding:    Not pregnant.    Not breastfeeding.    Regarding date of last menstrual period, patient writes: 5/15/24.   Self-exam:    Height: 5' 4\"    Weight: 180 lbs    Tonsillar edema.    Purulent tonsils.    Palatal petechiae.    Swollen/painful neck lymph nodes.   Recent antibiotic use:    Has not taken antibiotics for similar symptoms within the past month.   Current medications   Currently taking sertraline 100 MG tablet, tiZANidine 4 MG tablet, Rosy 3-0.03 MG per tablet, amitriptyline 75 MG tablet, buPROPion  MG 24 hr tablet, and ascorbic acid / biotin Oral Tablet.   Medication allergies   None.   Medication contraindication review   Patient has history of depression. Therefore, the following medication(s) will not be prescribed:    Metoclopramide.   No history of metoclopramide-associated dystonic reaction and tardive dyskinesia.   No known history of amoxicillin-clavulanate-associated cholestatic jaundice or hepatic impairment.   No known history of azithromycin-associated cholestatic jaundice or hepatic impairment.   Past medical history   Immune conditions:   No immunocompromising conditions.   No history of cancer.   Social history   Never smoked tobacco.   High-risk household contacts:    Household contact with diabetes.   Patient-submitted comments   Patient was asked if they had anything to add about their symptoms. Patient writes: Nephew has strep and has been staying with me .   Patient did not request an excuse note.   Assessment   Strep pharyngitis.   This is the likely diagnosis based on patient's interview responses, " including:    Symptom profile    Recent strep exposure   Plan   Medications:    amoxicillin 500 mg capsule RX 500mg 1 cap PO bid 10d for infection. This medication is an antibiotic. Take it exactly as directed. You must finish the entire course of medication, even if you feel better after the first few days of treatment. Amount is 20 cap.   The patient's prescription will be sent to:   [facility]/pharmacy #[id number] [address] [facility] [id number]   Phone: [phone]     Fax: [phone]   Education:    Condition and causes    Prevention    Treatment and self-care    When to call provider   ----------   Electronically signed by SEN Sandoval on 2024-05-19 at 14:29PM   ----------   Patient Interview Transcript:   Which of these symptoms are bothering you? Select all that apply.    Sore throat   Not selected:    Cough    Shortness of breath    Stuffed-up nose or sinuses    Runny nose    Itchy or watery eyes    Itchy nose or sneezing    Loss of smell or taste    Hoarse voice or loss of voice    Headache    Fever    Sweats    Chills    Muscle or body aches    Fatigue or tiredness    Nausea or vomiting    Diarrhea    I don't have any of these symptoms   When did your current symptoms start? Select one.    3 to 5 days ago   Not selected:    Less than 48 hours ago    6 to 9 days ago    10 to 14 days ago    2 to 3 weeks ago    3 to 4 weeks ago    More than a month ago   Do you know the exact date your symptoms started? If so, enter the date as MM/DD/YY. Select one.    Yes (specify): 05/16/24   Not selected:    No   Did your symptoms come on suddenly or gradually? Select one.    Gradually   Not selected:    Suddenly    I'm not sure   Since your current symptoms started, have you been tested for COVID-19? This includes home self-tests as well as nose swab or saliva tests done at a doctor's office, lab, or testing site. Select one.    Yes   Not selected:    No   When was your most recent COVID-19 test? Select one.    " Within the last 24 hours   Not selected:    Within the last week (specify date as MM/DD/YY)    7 to 14 days ago    15 to 30 days ago    More than 1 month ago   What was the result of your most recent COVID-19 test? Select one.    Negative   Not selected:    Positive   Has anyone in your household tested positive for COVID-19 in the past 14 days? Select one.    No   Not selected:    Yes   In the last 14 days, have you had close contact with someone who has COVID-19? \"Close contact\" means any of these: - Caring for someone with COVID-19. - Being within 6 feet of someone with COVID-19 for a total of at least 15 minutes over a 24-hour period. For example, three 5-minute exposures for a total of 15 minutes. - Being in direct contact with respiratory droplets from someone with COVID-19 (being coughed on, kissing, sharing utensils). Select one.    No, not that I know of   Not selected:    Yes, a confirmed case    Yes, a suspected case   Have you gotten the [id number] updated COVID-19 vaccine? This means either the updated Pfizer-BioNTech or Moderna vaccine after September 12, 2023; or the updated Novavax vaccine after October 3, 2023. Select one.    No   Not selected:    Yes   Since your symptoms started, have you felt dizzy? Select one.    No   Not selected:    Yes, but I can still do my regular daily activities    Yes, and it makes it hard to stand, walk, or do daily activities   Do you have chest pain? You might also feel it as discomfort, aching, tightness, or squeezing in the chest. Select one.    No   Not selected:    Yes   Do you feel sinus pain or pressure in any of these areas?    No   Not selected:    In my forehead    Around my eyes    Behind my nose    In my cheeks    In my upper teeth or jaw   Can you swallow liquids and solid foods? A sore throat may be painful when swallowing, but it shouldn't prevent you from swallowing. Select one.    Yes, but it's painful   Not selected:    Yes, with ease    Yes, but it's " uncomfortable    It's hard to swallow anything because it feels like liquids and food get stuck in my throat    No, I can't swallow anything, liquid or solid foods   Is your throat pain worse on one side than the other? Select one.    Yes, it's worse on the right side   Not selected:    Yes, it's worse on the left side    No, it's the same on both sides   Which of these best describes your throat pain? Select one.    Pain on both sides, but worse on the right   Not selected:    Severe pain on the right, and little to no pain on the left   To recommend the best treatment, we need to see photos of your throat. You can have someone else take the photo, or use a mirror. If you choose not to send photos, you can still continue this interview, but your treatment options may be affected. Select one.    I'd rather not send photos   Not selected:    Someone can take the photos for me    I'll take the photos myself   Have you urinated at least 3 times in the last 24 hours? Select one.    Yes   Not selected:    No   Do your face, lips, or nail beds appear blue or gray? Select one.    No   Not selected:    Yes   Do you have any swelling, pain, redness, or increased warmth in the calf or lower part of ONE leg only? Select one.    No   Not selected:    Yes   Changes in alertness or awareness may mean you need emergency care. Since your symptoms started, have you had any of these? Select all that apply.    None of the above   Not selected:    Confusion    Slurred speech    Not knowing where you are or what day it is    Difficulty staying conscious    Fainting or passing out   Do your symptoms include a whistling sound, or wheezing, when you breathe? Select one.    No   Not selected:    Yes   Since your symptoms started, have you noticed that one or both of your eyes is bulging or poking out? Select one.    No   Not selected:    Yes   Do you have any of these symptoms in your ear(s)? Select all that apply.    Pressure   Not  selected:    Pain    Fullness    Crackling or popping    Plugged or blocked sensation    None of the above   Are your tonsils larger than usual?    Yes   Not selected:    No, not that I can tell    I've had my tonsils removed   Is there redness in the back of your throat or on your tonsils? Select all that apply.    Yes, on the tonsils   Not selected:    Yes, in the back of the throat    No, not that I can see   Is there any white or yellow pus on your tonsils?    Yes   Not selected:    No, not that I can see   Are there red spots on the roof of your mouth or the back of your throat?    Yes   Not selected:    No, not that I can see   Are your glands/lymph nodes swollen, or does it hurt when you touch them?    Yes   Not selected:    No, not that I can tell   In the past 2 weeks, has anyone around you (such as at school, work, or home) had a confirmed diagnosis of strep throat? A confirmed diagnosis means that a throat swab and lab test were done to verify a strep throat infection. Select one.    Yes   Not selected:    No, not that I know of   Do you think you might have strep throat? Select one.    Yes   Not selected:    No   In the past week, has anyone around you (such as at school, work, or home) had a confirmed diagnosis of the flu? A confirmed diagnosis means that a nose swab was done to verify a flu infection. Select all that apply.    No, not that I know of   Not selected:    I live with someone who has the flu    I've been within touching distance of someone who has the flu    I've walked by, or sat about 3 feet away from, someone who has the flu    I've been in the same building as someone who has the flu   Have you ever been diagnosed with asthma? Select one.    No   Not selected:    Yes   Have you ever been diagnosed with chronic obstructive pulmonary disease (COPD)? Select one.    No, not that I know of   Not selected:    Yes   In the past month, have you taken antibiotics for similar symptoms? Examples  of antibiotics include amoxicillin, amoxicillin-clavulanate (Augmentin), penicillin, cefdinir (Omnicef), doxycycline, and clindamycin (Cleocin). Select one.    No   Not selected:    Yes (specify)   Do any of these apply to you? Select all that apply.    None of the above   Not selected:    I've been hospitalized within the last 5 days    I have diabetes    I'm in close contact with a child in    Do you have allergies (pollen, dust mites, mold, animal dander)? Select one.    No, not that I know of   Not selected:    Yes   Have you had a flu shot this season? Select one.    No   Not selected:    Yes, less than 2 weeks ago    Yes, 2 to 4 weeks ago    Yes, 1 to 3 months ago    Yes, 3 to 6 months ago    Yes, more than 6 months ago   Are you pregnant? Select one.    No   Not selected:    Yes   When was your last menstrual period? If you don't currently have periods or no longer have periods, please briefly explain.    5/15/24   Within the last 2 weeks, have you: - Given birth - Had a miscarriage - Had a pregnancy loss - Had an  Being postpartum (live birth or loss) within the last 2 weeks increases your risk of flu complications. Select one.    No   Not selected:    Yes   Are you breastfeeding? Select one.    No   Not selected:    Yes   The flu and COVID-19 can be more serious for people in certain groups. The next few questions help us figure out if you or anyone you live with is at higher risk for complications from these infections. Do any of these statements apply to you? Select all that apply.    None of the above   Not selected:    I'm     I'm     I'm Black    I'm  or    Do you smoke tobacco? Select one.    No   Not selected:    Yes, every day    Yes, some days    No, I quit   Do you have a mostly inactive lifestyle? Answer yes if all of these are true: - You spend at least 6 hours a day sitting or lying down - You get less than 2 and a half hours per week of  moderate exercise such as walking fast - You get less than 1 hour and 15 minutes per week of intense exercise such as jogging or running Select one.    No   Not selected:    Yes   Do you have any of these conditions? Select all that apply.    None of the above   Not selected:    Chronic lung disease, such as cystic fibrosis or interstitial fibrosis    Heart disease, such as congenital heart disease, congestive heart failure, or coronary artery disease    Disorder of the brain, spinal cord, or nerves and muscles, such as dementia, cerebral palsy, epilepsy, muscular dystrophy, or developmental delay    Metabolic disorder or mitochondrial disease    Cerebrovascular disease, such as stroke or another condition affecting the blood vessels or blood supply to the brain    Down syndrome    Mood disorder, including depression or schizophrenia spectrum disorders    Substance use disorder, such as alcohol, opioid, or cocaine use disorder    Tuberculosis    Primary immunodeficiency   Do you live in a group care setting? Examples include: - Nursing home - Residential care - Psychiatric treatment facility - [facility] - [facility] - [facility] and [facility] - Homeless shelter - Foster care setting Select one.    No   Not selected:    Yes   Are you a healthcare worker? Select one.    No   Not selected:    Yes   People with a very high body mass index (BMI) are at higher risk for developing complications from the flu and severe illness from COVID-19. To determine your BMI, we need to know your weight and height. Please enter your weight (in pounds).    Weight   Please enter your height.    Height   Do you have any of these conditions that can affect the immune system? Scroll to see all options. Select all that apply.    None of these   Not selected:    History of bone marrow transplant    Chronic kidney disease    Chronic liver disease (including cirrhosis)    HIV/AIDS    Inflammatory bowel disease (Crohn's disease or ulcerative  colitis)    Lupus    Moderate to severe plaque psoriasis    Multiple sclerosis    Rheumatoid arthritis    Sickle cell anemia    Alpha or beta thalassemia    History of kidney, liver, heart, or other solid organ transplant    History of liver, heart, or other solid organ transplant    History of spleen removal    An autoimmune disorder not listed here (specify)    A condition requiring treatment with long-term use of oral steroids (such as prednisone, prednisolone, or dexamethasone) (specify)   Have you ever been diagnosed with cancer? Select one.    No   Not selected:    Yes, I have cancer now    Yes, but I'm in remission   The flu and COVID-19 can be more serious for people in certain groups. Do any of these apply to the people who live with you? Select all that apply.    None of the above   Not selected:    Under age 5    Over age 65            Black     or     Pregnant    Has given birth, had a miscarriage, had a pregnancy loss, or had an  in the last 2 weeks   Does any member of your household have any of these medical conditions? Select all that apply.    Diabetes   Not selected:    Asthma    Disorders of the brain, spinal cord, or nerves and muscles, such as dementia, cerebral palsy, epilepsy, muscular dystrophy, or developmental delay    Chronic lung disease, such as COPD or cystic fibrosis    Heart disease, such as congenital heart disease, congestive heart failure, or coronary artery disease    Cerebrovascular disease, such as stroke or another condition affecting the blood vessels or blood supply to the brain    Blood disorders, such as sickle cell disease    Metabolic disorders such as inherited metabolic disorders or mitochondrial disease    Kidney disorders    Liver disorders    Weakened immune system due to illness or medications such as chemotherapy or steroids    Children under the age of 19 who are on long-term aspirin therapy    Extreme obesity (BMI  > 40)    None of the above   Do you have any of these conditions? Scroll to see all options. Select all that apply.    Depression   Not selected:    Blockage or narrowing of the blood vessels of the heart    Blood dyscrasia, such anemia, leukemia, lymphoma, or myeloma    Bone marrow depression    Catecholamine-releasing paraganglioma    Congenital long QT syndrome    Difficulty urinating or completely emptying your bladder    Uncorrected electrolyte abnormalities    Fungal infection    Gastrointestinal (GI) bleeding    Gastrointestinal (GI) obstruction    Recent heart attack    High blood pressure    Irregular heartbeat or heart rhythm    Mononucleosis (mono)    Myasthenia gravis    Parkinson's disease    Pheochromocytoma    Reye syndrome    Seizure disorder    Thyroid disease    Ulcerative colitis    None of the above   Have you ever had either of these conditions? Select all that apply.    No   Not selected:    Metoclopramide-associated dystonic reaction    Tardive dyskinesia   Just a few more questions about medications, and then you're finished. Have you used any non-prescription medications or nasal sprays for your current symptoms? Examples include saline sprays, decongestants, NyQuil, and Tylenol. Select one.    No   Not selected:    Yes   Have you taken any monoamine oxidase inhibitor (MAOI) medications in the last 14 days? Examples include rasagiline (Azilect), selegiline (Eldepryl, Zelapar), isocarboxazid (Marplan), phenelzine (Nardil), and tranylcypromine (Parnate). Select one.    No, not that I know of   Not selected:    Yes   Do you take Kynmobi or Apokyn (apomorphine)? Select one.    No   Not selected:    Yes   Are you still taking these medications listed in your medical record? If you're not taking any of these, click Next. Select all that apply.    sertraline 100 MG tablet    tiZANidine 4 MG tablet    Rosy 3-0.03 MG per tablet    amitriptyline 75 MG tablet    buPROPion  MG 24 hr tablet   Are  you taking any other medications, vitamins, or supplements? Select one.    Yes   Not selected:    No   Have you ever had an allergic or bad reaction to any medication? Select one.    No   Not selected:    Yes   Are you allergic to milk or to the proteins found in milk (for example, whey or casein)? A milk allergy is different from lactose intolerance. Select one.    No, not that I know of   Not selected:    Yes   Have you ever had jaundice or liver problems as a result of taking amoxicillin-clavulanate (Augmentin)? Jaundice is a condition in which the skin and the whites of the eyes turn yellow. Select all that apply.    No, not that I know of   Not selected:    Yes, jaundice    Yes, liver problems   Have you ever had jaundice or liver problems as a result of taking azithromycin (Zithromax, Zmax)? Jaundice is a condition in which the skin and the whites of the eyes turn yellow. Select all that apply.    No, not that I know of   Not selected:    Yes, jaundice    Yes, liver problems   Do you need a doctor's note? A doctor's note confirms that you received care today and states when you can return to school or work. It does not contain information about your diagnosis or treatment plan. Your provider will make the final decision on whether to give you a doctor's note and for how long. Doctor's notes CANNOT be backdated. We can't provide medical leave paperwork through this type of visit. If more paperwork is needed to request time off, contact your primary care provider. Select one.    No   Not selected:    Today only (1 day)    Today and tomorrow (2 days)    3 days    5 days    7 days   Is there anything you'd like to add about your symptoms? Please limit your comments to the symptoms covered in this interview. If you include comments about other concerns, your provider may recommend that you be seen in person.    __Nephew has strep and has been staying with me __   ----------   Medical history   Medical history data  does not currently exist for this patient.

## 2024-05-19 NOTE — EXTERNAL PATIENT INSTRUCTIONS
Note   Drink plenty of water Over the counter pain relievers okay If symptoms do not improve in 3-5 days follow up with your primary care provider or urgent care   Diagnosis   Strep pharyngitis (strep throat)   My name is SEN Sandoval, and I'm a healthcare provider at [facility]. I reviewed your interview, and I see that you have strep throat. This can be a painful condition, but it responds well to treatment.   About your diagnosis   Strep throat is an infection in the throat and tonsils caused by group A streptococcus bacteria. Strep throat is most often spread by droplets that are released into the air after an infected person coughs or sneezes. You can also get strep throat by sharing cups or utensils with an infected person.   Symptoms usually begin within 2 to 5 days after a person has been exposed. The pain from strep throat usually starts quickly and can be severe, especially when swallowing. Body aches and pains are common.   What to expect   If you follow this treatment plan, you should start to feel better within a few days.   Medications   Your pharmacy   [facility] #[id number] [address] [place] [phone]     Prescription   Amoxicillin (500mg): Take 1 capsule by mouth twice a day for 10 days for infection. This medication is an antibiotic. Take it exactly as directed. You must finish the entire course of medication, even if you feel better after the first few days of treatment.    Start taking the antibiotics I've prescribed right away. You need to finish the entire course of antibiotics, even if you start to feel better before the pills run out.    Some people develop a yeast infection after taking antibiotics. If you get a yeast infection, you can treat it with antifungal creams or suppositories. These are available without a prescription at [employer] and many supermarkets.   Other treatment    Rest and drink plenty of water.    Choose throat-friendly liquids and  "foods, such as lemon tea, broth, applesauce, frozen yogurt, or popsicles.    Gargle with salt water several times a day to help with your throat pain.    Try cough drops and throat lozenges for extra relief.    Stir a teaspoon of honey into warm water or weak tea to help soothe a sore throat.    Avoid smoke and air pollution. Smoke can make infections worse.   When to seek care   Call us at [phone]   with any sudden or unexpected symptoms.    Symptoms that don't improve within 48 hours of starting antibiotics.    Symptoms that get better for a few days and then suddenly get worse.    Worsening fever.    Your throat pain becomes worse and makes swallowing extremely difficult or impossible.    Muffled voice (it may sound like you are talking with a mouthful of food).    Difficulty opening your mouth or jaw.    Stiff neck.    Joint pain, or swelling that moves from joint to joint.    Severe stomach pain.    Shortness of breath.    Nosebleed.    Severe fatigue.    A new rash.    Odd emotional or behavioral changes.    Uncontrollable body movements or \"jerkiness.\"    Severe chest pain.   Preventing the spread of respiratory viruses   COVID-19, the flu, and other respiratory illnesses can have similar symptoms. These include fever, cough, shortness of breath, fatigue, muscle or body aches, headaches, new loss of sense of taste or smell, sore throat, stuffy or runny nose, nausea or vomiting, and diarrhea. Most people with a respiratory infection have mild symptoms and can rest at home until they get better. Elderly people and those with chronic medical problems may be at risk for more serious complications. It's important to take steps to prevent the spread of respiratory illness.   When you have symptoms of a respiratory virus:   Stay home and away from others, including people you live with who aren't sick. This is called isolation. You can stop isolation and go back to normal activities when both of these are " true:    You've been feeling better overall for 24 hours    You've had no fever for 24 hours, and you're not using fever-reducing medication like Tylenol or ibuprofen   Then, for the next 5 days:    Open windows at home when possible, and use an air purifier    Consider wearing a face mask when around other people indoors    Take a COVID-19 test before being around other people indoors    Keep a physical distance from people   If you start to feel worse or get a fever again, you need to start isolation over again.   If you've had close contact with someone who has COVID, but you don't have symptoms:   You don't need to quarantine. You should take a COVID-19 test at least 5 days after the last close contact.   If you test positive for COVID but don't have symptoms:   You might be contagious. For the next 5 days:    Open windows at home when possible, and use an air purifier    Consider wearing a face mask when around other people indoors    Take a COVID-19 test before being around other people indoors    Keep a physical distance from people   Other tips to prevent the spread of respiratory viruses    Cover your mouth and nose with a tissue when you cough or sneeze. Throw used tissues in a lined trash can right away, and wash your hands immediately after.    Wash your hands often with soap and water for at least 20 seconds. If soap and water aren't available, clean your hands with a hand  that contains at least 60% alcohol. Cover all surfaces of your hands and rub them together until they feel dry.    Avoid touching your face, especially your eyes, nose, and mouth.    Clean high-touch surfaces daily. High-touch surfaces include counters, tabletops, doorknobs, bathroom fixtures, toilets, phones, keyboards, tablets, and bedside tables. You can use soap, detergents, 60%-80% ethanol or isopropyl alcohol,  such as Windex, or bleach. All of these  are effective at killing the virus that causes  COVID-19.   COVID-19 vaccine information   COVID-19 vaccines are safe, effective, and free. Everyone 6 months or older can get an updated COVID-19 vaccine. Visit www.cdc.gov/coronavirus/2019-ncov/vaccines/stay-up-to-date.html   to find out how to stay up to date with your COVID-19 vaccines. Immunocompromised people can visit www.cdc.gov/coronavirus/2019-ncov/vaccines/recommendations/immuno.html  .   Side effects such as a sore arm, tiredness, headache, and muscle pain may occur within two days of getting the vaccine and last a day or two. For the Moderna or Pfizer vaccines, side effects are more common after the second dose. People over the age of 55 are less likely to have side effects than younger people.   People who've had COVID-19 should still get vaccinated to protect themselves. It's possible to be infected by the COVID-19 virus more than once.   People who've recently had COVID-19 should wait to get vaccinated until they've recovered and completed their isolation period.   To find a COVID-19 vaccination site near you, visit www.vaccines.gov/  , call 1-444.710.9011  , or text your zip code to 097881 ([place]). Message and data rates may apply.   Flu vaccine information   Everyone 6 months of age and older should get a yearly flu vaccine. Children younger than 6 months old can't get the flu vaccine. This means infants are at high risk of serious complications from the flu. It's especially important for those who are around infants to get the flu vaccine.   It's best to get a flu shot by the end of October. Once you're vaccinated, it takes about two weeks for antibodies to develop and protect you against the flu. That's why it's important to get vaccinated as soon as possible.   Even if you don't get a flu shot by the end of October, you should still get one. As long as the flu viruses are still in your community, flu vaccines will remain available, even into January of the following year or later.   You need to  get a flu shot every year. Flu viruses are constantly changing, so flu vaccines are usually updated from one season to the next. Your protection from the flu vaccine also lessens over time.   Common side effects of the flu shot include soreness, redness and/or swelling where the shot was given, low grade fever, and aches. Common side effects of the nasal spray flu vaccine for adults include runny nose, headaches, sore throat, and cough. For children, side effects include wheezing, vomiting, muscle aches, and fever.   The flu vaccine is safe and effective. You can't get the flu from a flu vaccine.   It's safe to get the flu vaccine at the same time as a COVID-19 vaccine.   Contact your healthcare provider today for details on when and where to get your flu vaccine.   Your provider   Your diagnosis was provided by SEN Sandoval, a member of your trusted care team at [facility].   If you have any questions, call us at [phone]  .

## 2024-05-20 ENCOUNTER — OFFICE VISIT (OUTPATIENT)
Dept: GASTROENTEROLOGY | Facility: CLINIC | Age: 24
End: 2024-05-20
Payer: COMMERCIAL

## 2024-05-20 VITALS
SYSTOLIC BLOOD PRESSURE: 122 MMHG | HEIGHT: 64 IN | DIASTOLIC BLOOD PRESSURE: 82 MMHG | OXYGEN SATURATION: 99 % | BODY MASS INDEX: 32.61 KG/M2 | WEIGHT: 191 LBS | HEART RATE: 125 BPM | TEMPERATURE: 97.8 F

## 2024-05-20 DIAGNOSIS — K59.00 CONSTIPATION, UNSPECIFIED CONSTIPATION TYPE: Primary | ICD-10-CM

## 2024-05-20 PROCEDURE — 99214 OFFICE O/P EST MOD 30 MIN: CPT | Performed by: NURSE PRACTITIONER

## 2024-05-20 RX ORDER — POLYETHYLENE GLYCOL 3350, SODIUM SULFATE ANHYDROUS, SODIUM BICARBONATE, SODIUM CHLORIDE, POTASSIUM CHLORIDE 236; 22.74; 6.74; 5.86; 2.97 G/4L; G/4L; G/4L; G/4L; G/4L
4 POWDER, FOR SOLUTION ORAL ONCE
Qty: 4000 ML | Refills: 0 | Status: SHIPPED | OUTPATIENT
Start: 2024-05-20 | End: 2024-05-20

## 2024-05-20 NOTE — H&P (VIEW-ONLY)
[facility] GASTROENTEROLOGY - OFFICE NOTE    5/20/2024    Sisi Gale   2000    Primary Physician: Thalia Avilez APRN    Chief Complaint   Patient presents with    GI Problem     Change in bowel habits         HISTORY OF PRESENT ILLNESS:    Sisi Gale is a 23 y.o. female presents  with change in bowel habits x 6 mo, constipation. Before the last 6 mo had bm once every other day. C/o abdominal bloating. No rectal bleeding. No weight loss. No fever.         Miralax, enema, olive oil, milk of molasses enema. Now using enema every other day.   Started welbutrin  few mo ago.   Drinks lots of water, fiber supplement, and does exercise daily. .       Tsh normal.   XR Abdomen KUB (04/03/2024 08:06)       Past Medical History:   Diagnosis Date    Allergic     Anxiety        Past Surgical History:   Procedure Laterality Date    HEMORRHOIDECTOMY      WISDOM TOOTH EXTRACTION         Outpatient Medications Marked as Taking for the 5/20/24 encounter (Office Visit) with Sheryl Franklin APRN   Medication Sig Dispense Refill    amitriptyline (ELAVIL) 75 MG tablet Take 1 tablet by mouth every night at bedtime.      buPROPion XL (Wellbutrin XL) 150 MG 24 hr tablet Take 1 tablet by mouth Daily. 90 tablet 1    Butalbital-APAP-Caff-Cod -80-30 MG capsule Take 1 capsule by mouth As Needed.      sertraline (ZOLOFT) 100 MG tablet Take 1 tablet by mouth Daily for 90 days. 90 tablet 0    Rosy 3-0.03 MG per tablet Take 1 tablet by mouth Daily.      tiZANidine (ZANAFLEX) 4 MG tablet Take 0.5-1 tablets by mouth Every 8 (Eight) Hours As Needed.         Allergies   Allergen Reactions    Bactrim [Sulfamethoxazole-Trimethoprim] Hives    Biaxin [Clarithromycin] Hives    Cephalosporins Hives    Sulfa Antibiotics Hives       Social History     Socioeconomic History    Marital status: Single   Tobacco Use    Smoking status: Never     Passive exposure: Never    Smokeless tobacco: Never   Substance and Sexual Activity  "   Alcohol use: No    Drug use: Never    Sexual activity: Not Currently     Partners: Male     Birth control/protection: Condom, Birth control pill       Family History   Problem Relation Age of Onset    No Known Problems Mother     Colon polyps Father     Colon cancer Neg Hx        Review of Systems   Constitutional:  Negative for chills, fever and unexpected weight change.   Respiratory:  Negative for shortness of breath.    Cardiovascular:  Negative for chest pain.   Gastrointestinal:  Negative for abdominal distention, abdominal pain, anal bleeding, blood in stool, diarrhea, nausea and vomiting.        Vitals:    05/20/24 0824   BP: 122/82   Pulse: (!) 125   Temp: 97.8 °F (36.6 °C)   SpO2: 99%   Weight: 86.6 kg (191 lb)   Height: 162.6 cm (64\")      Body mass index is 32.79 kg/m².    Physical Exam  Vitals reviewed.   Constitutional:       General: She is not in acute distress.  Cardiovascular:      Rate and Rhythm: Normal rate and regular rhythm.      Heart sounds: Normal heart sounds.   Pulmonary:      Effort: Pulmonary effort is normal.      Breath sounds: Normal breath sounds.   Abdominal:      General: Bowel sounds are normal. There is no distension.      Palpations: Abdomen is soft.      Tenderness: There is no abdominal tenderness.   Skin:     General: Skin is warm and dry.   Neurological:      Mental Status: She is alert.         Results for orders placed or performed in visit on 04/02/24   Comprehensive Metabolic Panel    Specimen: Blood   Result Value Ref Range    Glucose 95 70 - 99 mg/dL    BUN 8 6 - 20 mg/dL    Creatinine 0.71 0.57 - 1.00 mg/dL    EGFR Result 121 >59 mL/min/1.73    BUN/Creatinine Ratio 11 9 - 23    Sodium 139 134 - 144 mmol/L    Potassium 4.1 3.5 - 5.2 mmol/L    Chloride 100 96 - 106 mmol/L    Total CO2 22 20 - 29 mmol/L    Calcium 9.5 8.7 - 10.2 mg/dL    Total Protein 7.0 6.0 - 8.5 g/dL    Albumin 4.3 4.0 - 5.0 g/dL    Globulin 2.7 1.5 - 4.5 g/dL    A/G Ratio 1.6 1.2 - 2.2    Total " Bilirubin <0.2 0.0 - 1.2 mg/dL    Alkaline Phosphatase 104 44 - 121 IU/L    AST (SGOT) 16 0 - 40 IU/L    ALT (SGPT) 17 0 - 32 IU/L   TSH    Specimen: Blood   Result Value Ref Range    TSH 2.140 0.450 - 4.500 uIU/mL   CBC & Differential    Specimen: Blood   Result Value Ref Range    WBC 6.8 3.4 - 10.8 x10E3/uL    RBC 4.54 3.77 - 5.28 x10E6/uL    Hemoglobin 12.7 11.1 - 15.9 g/dL    Hematocrit 41.5 34.0 - 46.6 %    MCV 91 79 - 97 fL    MCH 28.0 26.6 - 33.0 pg    MCHC 30.6 (L) 31.5 - 35.7 g/dL    RDW 13.5 11.7 - 15.4 %    Platelets 284 150 - 450 x10E3/uL    Neutrophil Rel % 59 Not Estab. %    Lymphocyte Rel % 36 Not Estab. %    Monocyte Rel % 5 Not Estab. %    Eosinophil Rel % 0 Not Estab. %    Basophil Rel % 0 Not Estab. %    Neutrophils Absolute 4.0 1.4 - 7.0 x10E3/uL    Lymphocytes Absolute 2.4 0.7 - 3.1 x10E3/uL    Monocytes Absolute 0.3 0.1 - 0.9 x10E3/uL    Eosinophils Absolute 0.0 0.0 - 0.4 x10E3/uL    Basophils Absolute 0.0 0.0 - 0.2 x10E3/uL    Immature Granulocyte Rel % 0 Not Estab. %    Immature Grans Absolute 0.0 0.0 - 0.1 x10E3/uL           ASSESSMENT AND PLAN    Assessment & Plan     Diagnoses and all orders for this visit:    1. Constipation, unspecified constipation type (Primary)  -     Case Request; Standing  -     Case Request    Other orders  -     Implement Anesthesia Orders Day of Procedure; Standing  -     Obtain Informed Consent; Standing  -     polyethylene glycol (Golytely) 236 g solution; Take 4,000 mL by mouth 1 (One) Time for 1 dose. Take as directed per instruction sheet.  Dispense: 4000 mL; Refill: 0  -     linaclotide (LINZESS) 145 MCG capsule capsule; Take 1 capsule by mouth Daily. Take on empty stomach, at least 30 minutes before first meal of the day.  Dispense: 30 capsule; Refill: 11          In regards to constipation, recommend continue to drink plenty of water daily,  fiber supplement and exercise daily. Discussed trial of linzess and she is agreeable. Explained that linzess can  cause diarrhea initially but sometimes will resolve 10 - 14 days.  She may also use miralax while taking linzess. Discussed colonoscopy and she would like to proceed. I recommend 2 day prep ( mag citrate 2 days prior to colonoscopy then golytely prep).  Wellbutrin can cause constipation.         COLONOSCOPY WITH ANESTHESIA (N/A)  All risks, benefits, alternatives, and indications of colonoscopy procedure have been discussed with the patient. Risks to include perforation of the colon requiring possible surgery or colostomy, risk of bleeding from biopsies or removal of colon tissue, possibility of missing a colon polyp or cancer, or adverse drug reaction.  Benefits to include the diagnosis and management of disease of the colon and rectum. Alternatives to include barium enema, radiographic evaluation, lab testing or no intervention. Pt verbalizes understanding and agrees.            No follow-ups on file.            There are no Patient Instructions on file for this visit.      SEN Smith

## 2024-05-20 NOTE — PROGRESS NOTES
[facility] GASTROENTEROLOGY - OFFICE NOTE    5/20/2024    Sisi Gale   2000    Primary Physician: Thalia Avilez APRN    Chief Complaint   Patient presents with    GI Problem     Change in bowel habits         HISTORY OF PRESENT ILLNESS:    Sisi Gale is a 23 y.o. female presents  with change in bowel habits x 6 mo, constipation. Before the last 6 mo had bm once every other day. C/o abdominal bloating. No rectal bleeding. No weight loss. No fever.         Miralax, enema, olive oil, milk of molasses enema. Now using enema every other day.   Started welbutrin  few mo ago.   Drinks lots of water, fiber supplement, and does exercise daily. .       Tsh normal.   XR Abdomen KUB (04/03/2024 08:06)       Past Medical History:   Diagnosis Date    Allergic     Anxiety        Past Surgical History:   Procedure Laterality Date    HEMORRHOIDECTOMY      WISDOM TOOTH EXTRACTION         Outpatient Medications Marked as Taking for the 5/20/24 encounter (Office Visit) with Sheryl Franklin APRN   Medication Sig Dispense Refill    amitriptyline (ELAVIL) 75 MG tablet Take 1 tablet by mouth every night at bedtime.      buPROPion XL (Wellbutrin XL) 150 MG 24 hr tablet Take 1 tablet by mouth Daily. 90 tablet 1    Butalbital-APAP-Caff-Cod -85-30 MG capsule Take 1 capsule by mouth As Needed.      sertraline (ZOLOFT) 100 MG tablet Take 1 tablet by mouth Daily for 90 days. 90 tablet 0    Rosy 3-0.03 MG per tablet Take 1 tablet by mouth Daily.      tiZANidine (ZANAFLEX) 4 MG tablet Take 0.5-1 tablets by mouth Every 8 (Eight) Hours As Needed.         Allergies   Allergen Reactions    Bactrim [Sulfamethoxazole-Trimethoprim] Hives    Biaxin [Clarithromycin] Hives    Cephalosporins Hives    Sulfa Antibiotics Hives       Social History     Socioeconomic History    Marital status: Single   Tobacco Use    Smoking status: Never     Passive exposure: Never    Smokeless tobacco: Never   Substance and Sexual Activity  "   Alcohol use: No    Drug use: Never    Sexual activity: Not Currently     Partners: Male     Birth control/protection: Condom, Birth control pill       Family History   Problem Relation Age of Onset    No Known Problems Mother     Colon polyps Father     Colon cancer Neg Hx        Review of Systems   Constitutional:  Negative for chills, fever and unexpected weight change.   Respiratory:  Negative for shortness of breath.    Cardiovascular:  Negative for chest pain.   Gastrointestinal:  Negative for abdominal distention, abdominal pain, anal bleeding, blood in stool, diarrhea, nausea and vomiting.        Vitals:    05/20/24 0824   BP: 122/82   Pulse: (!) 125   Temp: 97.8 °F (36.6 °C)   SpO2: 99%   Weight: 86.6 kg (191 lb)   Height: 162.6 cm (64\")      Body mass index is 32.79 kg/m².    Physical Exam  Vitals reviewed.   Constitutional:       General: She is not in acute distress.  Cardiovascular:      Rate and Rhythm: Normal rate and regular rhythm.      Heart sounds: Normal heart sounds.   Pulmonary:      Effort: Pulmonary effort is normal.      Breath sounds: Normal breath sounds.   Abdominal:      General: Bowel sounds are normal. There is no distension.      Palpations: Abdomen is soft.      Tenderness: There is no abdominal tenderness.   Skin:     General: Skin is warm and dry.   Neurological:      Mental Status: She is alert.         Results for orders placed or performed in visit on 04/02/24   Comprehensive Metabolic Panel    Specimen: Blood   Result Value Ref Range    Glucose 95 70 - 99 mg/dL    BUN 8 6 - 20 mg/dL    Creatinine 0.71 0.57 - 1.00 mg/dL    EGFR Result 121 >59 mL/min/1.73    BUN/Creatinine Ratio 11 9 - 23    Sodium 139 134 - 144 mmol/L    Potassium 4.1 3.5 - 5.2 mmol/L    Chloride 100 96 - 106 mmol/L    Total CO2 22 20 - 29 mmol/L    Calcium 9.5 8.7 - 10.2 mg/dL    Total Protein 7.0 6.0 - 8.5 g/dL    Albumin 4.3 4.0 - 5.0 g/dL    Globulin 2.7 1.5 - 4.5 g/dL    A/G Ratio 1.6 1.2 - 2.2    Total " Bilirubin <0.2 0.0 - 1.2 mg/dL    Alkaline Phosphatase 104 44 - 121 IU/L    AST (SGOT) 16 0 - 40 IU/L    ALT (SGPT) 17 0 - 32 IU/L   TSH    Specimen: Blood   Result Value Ref Range    TSH 2.140 0.450 - 4.500 uIU/mL   CBC & Differential    Specimen: Blood   Result Value Ref Range    WBC 6.8 3.4 - 10.8 x10E3/uL    RBC 4.54 3.77 - 5.28 x10E6/uL    Hemoglobin 12.7 11.1 - 15.9 g/dL    Hematocrit 41.5 34.0 - 46.6 %    MCV 91 79 - 97 fL    MCH 28.0 26.6 - 33.0 pg    MCHC 30.6 (L) 31.5 - 35.7 g/dL    RDW 13.5 11.7 - 15.4 %    Platelets 284 150 - 450 x10E3/uL    Neutrophil Rel % 59 Not Estab. %    Lymphocyte Rel % 36 Not Estab. %    Monocyte Rel % 5 Not Estab. %    Eosinophil Rel % 0 Not Estab. %    Basophil Rel % 0 Not Estab. %    Neutrophils Absolute 4.0 1.4 - 7.0 x10E3/uL    Lymphocytes Absolute 2.4 0.7 - 3.1 x10E3/uL    Monocytes Absolute 0.3 0.1 - 0.9 x10E3/uL    Eosinophils Absolute 0.0 0.0 - 0.4 x10E3/uL    Basophils Absolute 0.0 0.0 - 0.2 x10E3/uL    Immature Granulocyte Rel % 0 Not Estab. %    Immature Grans Absolute 0.0 0.0 - 0.1 x10E3/uL           ASSESSMENT AND PLAN    Assessment & Plan     Diagnoses and all orders for this visit:    1. Constipation, unspecified constipation type (Primary)  -     Case Request; Standing  -     Case Request    Other orders  -     Implement Anesthesia Orders Day of Procedure; Standing  -     Obtain Informed Consent; Standing  -     polyethylene glycol (Golytely) 236 g solution; Take 4,000 mL by mouth 1 (One) Time for 1 dose. Take as directed per instruction sheet.  Dispense: 4000 mL; Refill: 0  -     linaclotide (LINZESS) 145 MCG capsule capsule; Take 1 capsule by mouth Daily. Take on empty stomach, at least 30 minutes before first meal of the day.  Dispense: 30 capsule; Refill: 11          In regards to constipation, recommend continue to drink plenty of water daily,  fiber supplement and exercise daily. Discussed trial of linzess and she is agreeable. Explained that linzess can  cause diarrhea initially but sometimes will resolve 10 - 14 days.  She may also use miralax while taking linzess. Discussed colonoscopy and she would like to proceed. I recommend 2 day prep ( mag citrate 2 days prior to colonoscopy then golytely prep).  Wellbutrin can cause constipation.         COLONOSCOPY WITH ANESTHESIA (N/A)  All risks, benefits, alternatives, and indications of colonoscopy procedure have been discussed with the patient. Risks to include perforation of the colon requiring possible surgery or colostomy, risk of bleeding from biopsies or removal of colon tissue, possibility of missing a colon polyp or cancer, or adverse drug reaction.  Benefits to include the diagnosis and management of disease of the colon and rectum. Alternatives to include barium enema, radiographic evaluation, lab testing or no intervention. Pt verbalizes understanding and agrees.            No follow-ups on file.            There are no Patient Instructions on file for this visit.      SEN Smith

## 2024-05-23 ENCOUNTER — OFFICE VISIT (OUTPATIENT)
Dept: INTERNAL MEDICINE | Facility: CLINIC | Age: 24
End: 2024-05-23
Payer: COMMERCIAL

## 2024-05-23 VITALS
WEIGHT: 187.4 LBS | DIASTOLIC BLOOD PRESSURE: 91 MMHG | RESPIRATION RATE: 16 BRPM | BODY MASS INDEX: 31.99 KG/M2 | SYSTOLIC BLOOD PRESSURE: 131 MMHG | TEMPERATURE: 97.8 F | HEART RATE: 118 BPM | HEIGHT: 64 IN

## 2024-05-23 DIAGNOSIS — J06.9 UPPER RESPIRATORY TRACT INFECTION, UNSPECIFIED TYPE: ICD-10-CM

## 2024-05-23 DIAGNOSIS — R50.9 FEVER, UNSPECIFIED FEVER CAUSE: Primary | ICD-10-CM

## 2024-05-23 LAB
EXPIRATION DATE: NORMAL
FLUAV AG NPH QL: NEGATIVE
FLUBV AG NPH QL: NEGATIVE
INTERNAL CONTROL: NORMAL
Lab: NORMAL
S PYO AG THROAT QL: NEGATIVE
SARS-COV-2 AG UPPER RESP QL IA.RAPID: NOT DETECTED

## 2024-05-23 RX ORDER — AMOXICILLIN 500 MG/1
500 CAPSULE ORAL
COMMUNITY
Start: 2024-05-19

## 2024-05-23 RX ORDER — METHYLPREDNISOLONE 4 MG/1
TABLET ORAL
Qty: 21 TABLET | Refills: 0 | Status: SHIPPED | OUTPATIENT
Start: 2024-05-23

## 2024-05-23 NOTE — PROGRESS NOTES
Subjective     Chief Complaint   Patient presents with   • Cough   • URI     Symptoms worsening since Sunday       Cough  Associated symptoms include a fever, postnasal drip, rhinorrhea and a sore throat.   URI   Associated symptoms include congestion, coughing, rhinorrhea and a sore throat.     The patient is a 23-year-old female who presents for evaluation of cough.     The patient conducted a questionnaire through the [facility] application and was prescribed amoxicillin. However, she began experiencing throat discomfort on Sunday, 05/19/2024. She acknowledges the need for a tonsillectomy, but her symptoms have escalated to the point where she is concerned about potential allergies. Currently, she is experiencing severe congestion and a persistent cough. 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 She experienced a low-grade fever on Sunday, 05/19/2024 and Monday, 05/20/2024. She is currently on day 4 of amoxicillin treatment. Her colleagues at her workplace suspect she is contagious. She has been self-medicating with over-the-counter Advil and Mucinex, which contain a decongestant. She has been using a nasal spray.    During her recent doctor's visit, her heart was elevated. Her systolic blood pressure was 120 mmHg. She has been maintaining a log of her blood pressure readings.    She is scheduled for a colonoscopy on 06/04/2024. Her colon is not functioning properly. She was put on Linzess on Monday 05/20/2024, which has helped a little bit.    Patient's PMR from outside medical facility reviewed and noted.    Review of Systems   Constitutional:  Positive for fatigue and fever.   HENT:  Positive for congestion, postnasal drip, rhinorrhea, sinus pressure and sore throat.    Respiratory:  Positive for cough.         Otherwise complete ROS reviewed and negative except as mentioned in the HPI.    Past Medical History:   Past Medical  History:   Diagnosis Date   • Allergic    • Anxiety      Past Surgical History:  Past Surgical History:   Procedure Laterality Date   • HEMORRHOIDECTOMY     • WISDOM TOOTH EXTRACTION       Social History:  reports that she has never smoked. She has never been exposed to tobacco smoke. She has never used smokeless tobacco. She reports that she does not drink alcohol and does not use drugs.    Family History: family history includes Colon polyps in her father; No Known Problems in her mother.      Allergies:  Allergies   Allergen Reactions   • Bactrim [Sulfamethoxazole-Trimethoprim] Hives   • Biaxin [Clarithromycin] Hives   • Cephalosporins Hives   • Sulfa Antibiotics Hives     Medications:  Prior to Admission medications    Medication Sig Start Date End Date Taking? Authorizing Provider   amitriptyline (ELAVIL) 75 MG tablet Take 1 tablet by mouth every night at bedtime.   Yes ProviderMary MD   amoxicillin (AMOXIL) 500 MG capsule 1 capsule. Please see attached for detailed directions 5/19/24  Yes ProviderMary MD   buPROPion XL (Wellbutrin XL) 150 MG 24 hr tablet Take 1 tablet by mouth Daily. 2/5/24  Yes Thalia Avilez APRN   Butalbital-APAP-Caff-Cod -21-30 MG capsule Take 1 capsule by mouth As Needed. 10/19/23  Yes ProviderMary MD   eletriptan (Relpax) 20 MG tablet Take 1 tablet by mouth Daily As Needed for Migraine. may repeat in 2 hours if necessary 1/5/24  Yes Thalia Avilez APRN   fluconazole (DIFLUCAN) 200 MG tablet TAKE 1 TABLET BY MOUTH EVERY DAY 5/13/24  Yes Thalia Avilez APRN   linaclotide (LINZESS) 145 MCG capsule capsule Take 1 capsule by mouth Daily. Take on empty stomach, at least 30 minutes before first meal of the day. 5/20/24  Yes Sheryl Franklin APRN   ondansetron ODT (ZOFRAN-ODT) 4 MG disintegrating tablet Place 1 tablet on the tongue Every 6 (Six) Hours As Needed for Nausea or Vomiting. 11/17/23  Yes Dom Henderson APRN Syeda  3-0.03 MG per tablet Take 1 tablet by mouth Daily. 11/7/23  Yes Mary Sharp MD   tamsulosin (FLOMAX) 0.4 MG capsule 24 hr capsule Take 1 capsule by mouth Every Night. 12/19/17  Yes Reynaldo Antoine MD   tiZANidine (ZANAFLEX) 4 MG tablet Take 0.5-1 tablets by mouth Every 8 (Eight) Hours As Needed.   Yes ProviderMary MD   butalbital-acetaminophen-caffeine-codeine (FIORICET WITH CODEINE) -90-30 MG per capsule Take 1 capsule by mouth.  Patient not taking: Reported on 5/23/2024 12/4/23   Mary Sharp MD   sertraline (ZOLOFT) 100 MG tablet Take 1 tablet by mouth Daily for 90 days. 11/15/23 5/20/24  Dex Del Rio APRN   azelastine (ASTELIN) 0.1 % nasal spray 2 sprays into the nostril(s) as directed by provider 2 (Two) Times a Day. Use in each nostril as directed 1/8/24 5/23/24  Dex Del Rio APRN   azithromycin (Zithromax Z-Tommy) 250 MG tablet Take 2 tablets by mouth on day 1, then 1 tablet daily on days 2-5 1/30/24 5/23/24  Dex Del Rio APRN   benzonatate (Tessalon Perles) 100 MG capsule Take 1 capsule by mouth 3 (Three) Times a Day As Needed for Cough. 2/15/24 5/23/24  Thalia Avilez APRN   Galcanezumab-gnlm 120 MG/ML solution prefilled syringe Inject 1 mL under the skin into the appropriate area as directed Every 30 (Thirty) Days. 11/20/23 5/23/24  Dex Del Rio APRN   methylPREDNISolone (MEDROL) 4 MG dose pack Take as directed on package instructions. 2/15/24 5/23/24  Thalia Avilez APRN   nitrofurantoin, macrocrystal-monohydrate, (Macrobid) 100 MG capsule Take 1 capsule by mouth 2 (Two) Times a Day. 3/19/24 5/23/24  Thalia Avilez APRN   oxyCODONE-acetaminophen (PERCOCET) 5-325 MG per tablet Take 1 tablet by mouth Every 6 (Six) Hours As Needed for Severe Pain. 11/17/23 5/23/24  Philippe Christianson MD       CHIQUIS:        PHQ-9 Depression Screening  Little interest or pleasure in doing things? 0-->not at all   Feeling down, depressed,  "or hopeless? 0-->not at all   Trouble falling or staying asleep, or sleeping too much?     Feeling tired or having little energy?     Poor appetite or overeating?     Feeling bad about yourself - or that you are a failure or have let yourself or your family down?     Trouble concentrating on things, such as reading the newspaper or watching television?     Moving or speaking so slowly that other people could have noticed? Or the opposite - being so fidgety or restless that you have been moving around a lot more than usual?     Thoughts that you would be better off dead, or of hurting yourself in some way?     PHQ-9 Total Score 0   If you checked off any problems, how difficult have these problems made it for you to do your work, take care of things at home, or get along with other people?         PHQ-9 Total Score: 0   0 (Negative screening for depression)  Support given, observe for worsening symptoms    Objective     Vital Signs: /91 (BP Location: Right arm, Patient Position: Sitting, Cuff Size: Adult)   Pulse 118   Temp 97.8 °F (36.6 °C) (Infrared)   Resp 16   Ht 162.6 cm (64\")   Wt 85 kg (187 lb 6.4 oz)   BMI 32.17 kg/m²   Physical Exam  Vitals and nursing note reviewed.   Constitutional:       Appearance: Normal appearance.   HENT:      Head: Normocephalic.      Right Ear: External ear normal.      Left Ear: External ear normal.      Nose: Nose normal. Congestion present.      Mouth/Throat:      Mouth: Mucous membranes are moist.      Pharynx: Posterior oropharyngeal erythema present.   Cardiovascular:      Rate and Rhythm: Normal rate and regular rhythm.      Pulses: Normal pulses.      Heart sounds: Normal heart sounds.   Pulmonary:      Effort: Pulmonary effort is normal. No respiratory distress.      Breath sounds: No stridor. No wheezing or rhonchi.   Neurological:      General: No focal deficit present.      Mental Status: She is alert and oriented to person, place, and time.   Psychiatric:    "      Mood and Affect: Mood normal.         Behavior: Behavior normal.         Thought Content: Thought content normal.         Judgment: Judgment normal.         Results Reviewed:  Glucose   Date Value Ref Range Status   04/03/2024 95 70 - 99 mg/dL Final   11/17/2023 94 65 - 99 mg/dL Final   05/06/2022 83 74 - 109 mg/dL Final     BUN   Date Value Ref Range Status   04/03/2024 8 6 - 20 mg/dL Final   11/17/2023 10 6 - 20 mg/dL Final   05/06/2022 9 6 - 20 mg/dL Final     Creatinine   Date Value Ref Range Status   04/03/2024 0.71 0.57 - 1.00 mg/dL Final   12/05/2023 0.70 0.60 - 1.30 mg/dL Final     Comment:     Serial Number: [id number]:  [id number]   05/06/2022 0.6 0.5 - 0.9 mg/dL Final     Sodium   Date Value Ref Range Status   04/03/2024 139 134 - 144 mmol/L Final   11/17/2023 138 136 - 145 mmol/L Final   05/06/2022 142 136 - 145 mmol/L Final     Potassium   Date Value Ref Range Status   04/03/2024 4.1 3.5 - 5.2 mmol/L Final   11/17/2023 3.8 3.5 - 5.2 mmol/L Final     Comment:     Slight hemolysis detected by analyzer. Result may be falsely elevated.   05/06/2022 4.2 3.5 - 5.0 mmol/L Final     Chloride   Date Value Ref Range Status   04/03/2024 100 96 - 106 mmol/L Final   11/17/2023 103 98 - 107 mmol/L Final   05/06/2022 103 98 - 111 mmol/L Final     CO2   Date Value Ref Range Status   11/17/2023 25.0 22.0 - 29.0 mmol/L Final   05/06/2022 21 (L) 22 - 29 mmol/L Final     Total CO2   Date Value Ref Range Status   04/03/2024 22 20 - 29 mmol/L Final     Calcium   Date Value Ref Range Status   04/03/2024 9.5 8.7 - 10.2 mg/dL Final   11/17/2023 9.0 8.6 - 10.5 mg/dL Final   05/06/2022 10.0 8.6 - 10.0 mg/dL Final     ALT (SGPT)   Date Value Ref Range Status   04/03/2024 17 0 - 32 IU/L Final   11/17/2023 15 1 - 33 U/L Final   05/06/2022 21 5 - 33 U/L Final     AST (SGOT)   Date Value Ref Range Status   04/03/2024 16 0 - 40 IU/L Final   11/17/2023 18 1 - 32 U/L Final   05/06/2022 20 5 - 32 U/L Final     WBC   Date Value Ref  Range Status   04/03/2024 6.8 3.4 - 10.8 x10E3/uL Final   05/06/2022 10.5 4.8 - 10.8 K/uL Final     Hematocrit   Date Value Ref Range Status   04/03/2024 41.5 34.0 - 46.6 % Final   11/17/2023 38.4 34.0 - 46.6 % Final   05/06/2022 42.0 37.0 - 47.0 % Final     Platelets   Date Value Ref Range Status   04/03/2024 284 150 - 450 x10E3/uL Final   11/17/2023 297 140 - 450 10*3/mm3 Final   05/06/2022 353 130 - 400 K/uL Final     Triglycerides   Date Value Ref Range Status   12/08/2023 129 0 - 149 mg/dL Final     HDL Cholesterol   Date Value Ref Range Status   12/08/2023 80 >39 mg/dL Final     LDL Chol Calc ([facility])   Date Value Ref Range Status   12/08/2023 93 0 - 99 mg/dL Final     Hemoglobin A1C   Date Value Ref Range Status   12/08/2023 5.7 (H) 4.8 - 5.6 % Final     Comment:              Prediabetes: 5.7 - 6.4           Diabetes: >6.4           Glycemic control for adults with diabetes: <7.0     05/06/2022 5.4 4.0 - 6.0 % Final     Comment:     HbA1c levels >6% are an indication of hyperglycemia during the preceding 2  to 3 months or longer.    HbA1c levels may reach 20% or higher in poorly controlled diabetes.  Therapeutic action is suggested at levels above 8%.    Diabetes patients with HbA1c levels below 7% meet the goal of the American  Diabetes Association.    HbA1c levels below the established reference range may indicate recent  episodes of hypoglycemia, the presence of Hb variants, or shortened lifetime  of erythrocytes.          Assessment / Plan     Assessment/Plan:  1. Fever, unspecified fever cause  - POCT rapid strep A  - POCT SARS-CoV-2 Antigen WEST  - POC Influenza A / B    2. Upper respiratory tract infection, unspecified type  - methylPREDNISolone (MEDROL) 4 MG dose pack; Take as directed on package instructions.  Dispense: 21 tablet; Refill: 0    Diagnoses and all orders for this visit:    1. Fever, unspecified fever cause (Primary)  -     POCT rapid strep A  -     POCT SARS-CoV-2 Antigen WEST  -     POC  Influenza A / B    2. Upper respiratory tract infection, unspecified type  -     methylPREDNISolone (MEDROL) 4 MG dose pack; Take as directed on package instructions.  Dispense: 21 tablet; Refill: 0      Upper respiratory infection  The patient's blood pressure and heart rate are elevated, potentially attributable to the use of decongestants. A swab was collected for COVID-19, influenza, and streptococcal testing. The patient has been advised to monitor her blood pressure and heart rate using a pulse oximeter after discontinuing the decongestants. A steroid pack will be prescribed. The patient has been advised to discontinue the use of decongestants and is recommended to use Mucinex. Complete abx regimen.       No follow-ups on file. unless patient needs to be seen sooner or acute issues arise.      I have discussed the patient results/orders and and plan/recommendation with them at today's visit.      SEN Silva   05/23/2024    Transcribed from ambient dictation for SEN Silva by Jeffrey García.  05/23/24   14:43 CDT    Patient or patient representative verbalized consent to the visit recording.  I have personally performed the services described in this document as transcribed by the above individual, and it is both accurate and complete.

## 2024-06-04 ENCOUNTER — ANESTHESIA (OUTPATIENT)
Dept: GASTROENTEROLOGY | Facility: HOSPITAL | Age: 24
End: 2024-06-04
Payer: COMMERCIAL

## 2024-06-04 ENCOUNTER — PATIENT MESSAGE (OUTPATIENT)
Dept: INTERNAL MEDICINE | Facility: CLINIC | Age: 24
End: 2024-06-04
Payer: COMMERCIAL

## 2024-06-04 ENCOUNTER — HOSPITAL ENCOUNTER (OUTPATIENT)
Facility: HOSPITAL | Age: 24
Setting detail: HOSPITAL OUTPATIENT SURGERY
Discharge: HOME | End: 2024-06-04
Attending: INTERNAL MEDICINE | Admitting: INTERNAL MEDICINE
Payer: COMMERCIAL

## 2024-06-04 ENCOUNTER — ANESTHESIA EVENT (OUTPATIENT)
Dept: GASTROENTEROLOGY | Facility: HOSPITAL | Age: 24
End: 2024-06-04
Payer: COMMERCIAL

## 2024-06-04 VITALS
DIASTOLIC BLOOD PRESSURE: 74 MMHG | HEART RATE: 102 BPM | SYSTOLIC BLOOD PRESSURE: 106 MMHG | BODY MASS INDEX: 33.13 KG/M2 | TEMPERATURE: 97.5 F | OXYGEN SATURATION: 99 % | RESPIRATION RATE: 20 BRPM | HEIGHT: 63 IN | WEIGHT: 187 LBS

## 2024-06-04 LAB — HCG SERPL QL: NEGATIVE

## 2024-06-04 PROCEDURE — 25810000003 SODIUM CHLORIDE 0.9 % SOLUTION: Performed by: ANESTHESIOLOGY

## 2024-06-04 PROCEDURE — 84703 CHORIONIC GONADOTROPIN ASSAY: CPT | Performed by: ANESTHESIOLOGY

## 2024-06-04 PROCEDURE — 45378 DIAGNOSTIC COLONOSCOPY: CPT | Performed by: INTERNAL MEDICINE

## 2024-06-04 PROCEDURE — 25010000002 PROPOFOL 10 MG/ML EMULSION: Performed by: NURSE ANESTHETIST, CERTIFIED REGISTERED

## 2024-06-04 RX ORDER — SODIUM CHLORIDE 9 MG/ML
500 INJECTION, SOLUTION INTRAVENOUS CONTINUOUS PRN
Status: DISCONTINUED | OUTPATIENT
Start: 2024-06-04 | End: 2024-06-04 | Stop reason: HOSPADM

## 2024-06-04 RX ORDER — PROPOFOL 10 MG/ML
VIAL (ML) INTRAVENOUS AS NEEDED
Status: DISCONTINUED | OUTPATIENT
Start: 2024-06-04 | End: 2024-06-04 | Stop reason: SURG

## 2024-06-04 RX ORDER — SODIUM CHLORIDE 9 MG/ML
40 INJECTION, SOLUTION INTRAVENOUS AS NEEDED
Status: CANCELLED | OUTPATIENT
Start: 2024-06-04

## 2024-06-04 RX ORDER — SODIUM CHLORIDE 0.9 % (FLUSH) 0.9 %
10 SYRINGE (ML) INJECTION EVERY 12 HOURS SCHEDULED
Status: CANCELLED | OUTPATIENT
Start: 2024-06-04

## 2024-06-04 RX ORDER — SODIUM CHLORIDE 9 MG/ML
100 INJECTION, SOLUTION INTRAVENOUS CONTINUOUS
Status: CANCELLED | OUTPATIENT
Start: 2024-06-04

## 2024-06-04 RX ORDER — LIDOCAINE HYDROCHLORIDE 20 MG/ML
INJECTION, SOLUTION EPIDURAL; INFILTRATION; INTRACAUDAL; PERINEURAL AS NEEDED
Status: DISCONTINUED | OUTPATIENT
Start: 2024-06-04 | End: 2024-06-04 | Stop reason: SURG

## 2024-06-04 RX ORDER — SODIUM CHLORIDE 0.9 % (FLUSH) 0.9 %
10 SYRINGE (ML) INJECTION AS NEEDED
Status: CANCELLED | OUTPATIENT
Start: 2024-06-04

## 2024-06-04 RX ORDER — ONDANSETRON 2 MG/ML
4 INJECTION INTRAMUSCULAR; INTRAVENOUS ONCE AS NEEDED
Status: DISCONTINUED | OUTPATIENT
Start: 2024-06-04 | End: 2024-06-04 | Stop reason: HOSPADM

## 2024-06-04 RX ADMIN — LIDOCAINE HYDROCHLORIDE 100 MG: 20 INJECTION, SOLUTION EPIDURAL; INFILTRATION; INTRACAUDAL; PERINEURAL at 12:07

## 2024-06-04 RX ADMIN — PROPOFOL 100 MG: 10 INJECTION, EMULSION INTRAVENOUS at 12:13

## 2024-06-04 RX ADMIN — PROPOFOL 50 MG: 10 INJECTION, EMULSION INTRAVENOUS at 12:10

## 2024-06-04 RX ADMIN — PROPOFOL 40 MG: 10 INJECTION, EMULSION INTRAVENOUS at 12:16

## 2024-06-04 RX ADMIN — SODIUM CHLORIDE: 9 INJECTION, SOLUTION INTRAVENOUS at 12:11

## 2024-06-04 RX ADMIN — SODIUM CHLORIDE 500 ML: 9 INJECTION, SOLUTION INTRAVENOUS at 11:00

## 2024-06-04 RX ADMIN — PROPOFOL 150 MG: 10 INJECTION, EMULSION INTRAVENOUS at 12:07

## 2024-06-04 NOTE — ANESTHESIA PREPROCEDURE EVALUATION
Anesthesia Evaluation     Patient summary reviewed and Nursing notes reviewed   no history of anesthetic complications:   NPO Solid Status: > 8 hours  NPO Liquid Status: > 2 hours           Airway   Mallampati: II  Dental      Pulmonary - negative pulmonary ROS   (-) not a smoker  Cardiovascular - negative cardio ROS  Exercise tolerance: good (4-7 METS)    (-) pacemaker, past MI, cardiac stents, CABG      Neuro/Psych- negative ROS  GI/Hepatic/Renal/Endo    (+) obesity    Musculoskeletal (-) negative ROS    Abdominal   (+) obese   Substance History - negative use     OB/GYN negative ob/gyn ROS         Other - negative ROS       ROS/Med Hx Other: PAT Nursing Notes unavailable.                     Anesthesia Plan    ASA 2     MAC     intravenous induction     Anesthetic plan, risks, benefits, and alternatives have been provided, discussed and informed consent has been obtained with: patient.        CODE STATUS:

## 2024-06-04 NOTE — ANESTHESIA POSTPROCEDURE EVALUATION
"Patient: Sisi Gale    Procedure Summary       Date: 06/04/24 Room / Location: [facility] ENDOSCOPY 2 /  [facility] ENDOSCOPY    Anesthesia Start: 1202 Anesthesia Stop: 1220    Procedure: COLONOSCOPY WITH ANESTHESIA Diagnosis:       Constipation, unspecified constipation type      (Constipation, unspecified constipation type [K59.00])    Surgeons: Arvind Dupont MD Provider: Roberto Lynch CRNA    Anesthesia Type: MAC ASA Status: 2            Anesthesia Type: MAC    Vitals  No vitals data found for the desired time range.          Post Anesthesia Care and Evaluation    Patient location during evaluation: PHASE II  Patient participation: complete - patient participated  Level of consciousness: awake  Pain score: 0  Pain management: adequate    Airway patency: patent  Anesthetic complications: No anesthetic complications  PONV Status: none  Cardiovascular status: acceptable  Respiratory status: acceptable  Hydration status: acceptable    Comments: Blood pressure 126/75, pulse (!) 123, temperature 97.5 °F (36.4 °C), temperature source Temporal, resp. rate 19, height 160 cm (63\"), weight 84.8 kg (187 lb), SpO2 100%, not currently breastfeeding.      "

## 2024-06-06 RX ORDER — PROPRANOLOL HYDROCHLORIDE 20 MG/1
20 TABLET ORAL 2 TIMES DAILY
Qty: 60 TABLET | Refills: 0 | Status: SHIPPED | OUTPATIENT
Start: 2024-06-06

## 2024-06-18 ENCOUNTER — E-VISIT (OUTPATIENT)
Dept: FAMILY MEDICINE CLINIC | Facility: TELEHEALTH | Age: 24
End: 2024-06-18
Payer: COMMERCIAL

## 2024-06-18 PROCEDURE — BRIGHTMDVISIT: Performed by: NURSE PRACTITIONER

## 2024-06-18 NOTE — E-VISIT TREATED
Chief Complaint: Cold, flu, COVID, sinus, hay fever, or seasonal allergies   Patient introduction   Patient is 23-year-old female with sore throat that started less than 48 hours ago.   COVID-19 testing history, vaccination status, and exposure:    Patient was tested for COVID-19 within the last 24 hours. Test result was negative.    Has not received an updated 8620-4720 COVID-19 vaccination (Pfizer-BioNTech or Moderna vaccine after September 12, 2023; or Novavax vaccine after October 3, 2023).    No known exposure to a person with a confirmed or suspected case of COVID-19.    No high-risk (household) exposure to COVID-19 within the last 14 days.   Risk factors for severe disease from COVID-19 infection    Higher risk for severe disease from COVID-19 because of BMI >= 25.   General presentation   Symptoms came on gradually.   Fever:    No fever.   Sinus and nasal symptoms:    Sinus pain or pressure on or around the forehead and eyes.    Patient first noticed sinus pain less than 5 days ago.    Sinus pain is not worse with Valsalva.    No nasal or sinus congestion.    No nasal discharge.    No itchy nose or sneezing.    No history of unhealed nasal septal ulcer/nasal wound.    No history of antibiotic treatment for sinus infection in the last year.    No history of deviated septum or nasal polyps.   Throat symptoms:    Sore throat. Has redness in the back of the throat and on the tonsils.    Has had recent strep exposure.    Patient thinks they have strep.    Has discomfort when swallowing but can swallow liquids and solid foods.   Head and body aches:    No headache.    No sweats.    No chills.    No myalgia.    No fatigue.   Cough:    No cough.   Wheezing and shortness of breath:    No COPD diagnosis.    No asthma diagnosis.    No wheezing.    No shortness of breath.   Chest pain:    No chest pain.   Ear symptoms:    None.   Dizziness:    No dizziness.   Allergies:    No history of allergies.   Flu exposure:    " No recent known exposure to a person with a confirmed flu diagnosis.    Has not had a flu vaccine this season.   Not taking any over-the-counter medications for current symptoms.   Review of red flags/alarm symptoms:    No changes in alertness or awareness.    No symptoms suggesting airway obstruction.    No decreased urination.    No blue or gray coloring present in face, lips, or nail beds.    No swelling, pain, redness, or increased warmth in the calf or lower part of ONE leg only.    No proptosis.   Risk factors for antibiotic resistance:    Antibiotic use for similar symptoms within the last 30 days.   Pregnancy/menstrual status/breastfeeding:    Not pregnant.    Not breastfeeding.    Regarding date of last menstrual period, patient writes: Last Wednesday .   Self-exam:    Height: 5' 4\"    Weight: 180 lbs    Tonsillar edema.    Purulent tonsils.    No palatal petechiae.    Neck lymph nodes feel normal.   Recent antibiotic use:    Has taken antibiotics for similar symptoms within the past month. Patient specifies the antibiotics taken as Amoxicillin.   Current medications   Currently taking propranolol 20 MG tablet, sertraline 100 MG tablet, tiZANidine 4 MG tablet, Rosy 3-0.03 MG per tablet, amitriptyline 75 MG tablet, and linaclotide 145 MCG capsule capsule.   Medication allergies   No relevant drug allergies.   Medication contraindication review   No history of anaphylactic reaction to beta-lactam antibiotics; aspirin triad; blood dyscrasia; bone marrow depression; catecholamine-releasing paraganglioma; coronary artery disease; coagulation disorder; congenital long QT syndrome; depression; electrolyte abnormalities; fungal infection; GI bleeding; GI obstruction; G6PD deficiency; heart arrhythmia; hypertension; mononucleosis; myasthenia; recent myocardial infarction; NSAID-induced asthma/urticaria; Parkinson's disease; pheochromocytoma; porphyria; Reye syndrome; seizure disorder; ulcerative colitis; and " urinary retention.   No history of metoclopramide-associated dystonic reaction and tardive dyskinesia.   No known history of amoxicillin-clavulanate-associated cholestatic jaundice or hepatic impairment.   No known history of azithromycin-associated cholestatic jaundice or hepatic impairment.   Past medical history   Immune conditions:   No immunocompromising conditions.   No history of cancer.   Social history   Never smoked tobacco.   High-risk household contacts:    Household contact 65 years or older.    Household contact with diabetes.   Patient-submitted comments   Patient was asked if they had anything to add about their symptoms. Patient writes: Almost positive it's strep. Been around a coworker that has it. .   Patient did not request an excuse note.   Assessment   Strep pharyngitis.   This is the likely diagnosis based on patient's interview responses, including:    Symptom profile    Recent strep exposure   Plan   Medications:    amoxicillin 500 mg capsule RX 500mg 1 cap PO bid 10d for infection. This medication is an antibiotic. Take it exactly as directed. You must finish the entire course of medication, even if you feel better after the first few days of treatment. Amount is 20 cap.   The patient's prescription will be sent to:   [id number]   [address] [place] [address]   Phone: [phone]     Fax: [phone]   Education:    Condition and causes    Prevention    Treatment and self-care    When to call provider   ----------   Electronically signed by SEN Marcelo on 2024-06-18 at 16:41PM   ----------   Patient Interview Transcript:   Which of these symptoms are bothering you? Select all that apply.    Sore throat   Not selected:    Cough    Shortness of breath    Stuffed-up nose or sinuses    Runny nose    Itchy or watery eyes    Itchy nose or sneezing    Loss of smell or taste    Hoarse voice or loss of voice    Headache    Fever    Sweats    Chills    Muscle or body aches    " Fatigue or tiredness    Nausea or vomiting    Diarrhea    I don't have any of these symptoms   When did your current symptoms start? Select one.    Less than 48 hours ago   Not selected:    3 to 5 days ago    6 to 9 days ago    10 to 14 days ago    2 to 3 weeks ago    3 to 4 weeks ago    More than a month ago   Did your symptoms come on suddenly or gradually? Select one.    Gradually   Not selected:    Suddenly    I'm not sure   Since your current symptoms started, have you been tested for COVID-19? This includes home self-tests as well as nose swab or saliva tests done at a doctor's office, lab, or testing site. Select one.    Yes   Not selected:    No   When was your most recent COVID-19 test? Select one.    Within the last 24 hours   Not selected:    Within the last week (specify date as MM/DD/YY)    7 to 14 days ago    15 to 30 days ago    More than 1 month ago   What was the result of your most recent COVID-19 test? Select one.    Negative   Not selected:    Positive   Has anyone in your household tested positive for COVID-19 in the past 14 days? Select one.    No   Not selected:    Yes   In the last 14 days, have you had close contact with someone who has COVID-19? \"Close contact\" means any of these: - Caring for someone with COVID-19. - Being within 6 feet of someone with COVID-19 for a total of at least 15 minutes over a 24-hour period. For example, three 5-minute exposures for a total of 15 minutes. - Being in direct contact with respiratory droplets from someone with COVID-19 (being coughed on, kissing, sharing utensils). Select one.    No, not that I know of   Not selected:    Yes, a confirmed case    Yes, a suspected case   Have you gotten the [id number] updated COVID-19 vaccine? This means either the updated Pfizer-BioNTech or Moderna vaccine after September 12, 2023; or the updated Novavax vaccine after October 3, 2023. Select one.    No   Not selected:    Yes   Since your symptoms started, have you " felt dizzy? Select one.    No   Not selected:    Yes, but I can still do my regular daily activities    Yes, and it makes it hard to stand, walk, or do daily activities   Do you have chest pain? You might also feel it as discomfort, aching, tightness, or squeezing in the chest. Select one.    No   Not selected:    Yes   Do you feel sinus pain or pressure in any of these areas?    In my forehead    Around my eyes   Not selected:    Behind my nose    In my cheeks    In my upper teeth or jaw    No   When did you first notice your sinus pain or pressure? Select one.    Less than 5 days ago   Not selected:    5 to 9 days ago    10 to 14 days ago    2 to 4 weeks ago    1 month ago or longer   Does coughing, sneezing, or leaning forward make your sinuses feel worse? Select one.    No   Not selected:    Yes   Can you swallow liquids and solid foods? A sore throat may be painful when swallowing, but it shouldn't prevent you from swallowing. Select one.    Yes, but it's uncomfortable   Not selected:    Yes, with ease    Yes, but it's painful    It's hard to swallow anything because it feels like liquids and food get stuck in my throat    No, I can't swallow anything, liquid or solid foods   Is your throat pain worse on one side than the other? Select one.    No, it's the same on both sides   Not selected:    Yes, it's worse on the right side    Yes, it's worse on the left side   To recommend the best treatment, we need to see photos of your throat. You can have someone else take the photo, or use a mirror. If you choose not to send photos, you can still continue this interview, but your treatment options may be affected. Select one.    I'd rather not send photos   Not selected:    Someone can take the photos for me    I'll take the photos myself   Have you urinated at least 3 times in the last 24 hours? Select one.    Yes   Not selected:    No   Do your face, lips, or nail beds appear blue or gray? Select one.    No   Not  selected:    Yes   Do you have any swelling, pain, redness, or increased warmth in the calf or lower part of ONE leg only? Select one.    No   Not selected:    Yes   Changes in alertness or awareness may mean you need emergency care. Since your symptoms started, have you had any of these? Select all that apply.    None of the above   Not selected:    Confusion    Slurred speech    Not knowing where you are or what day it is    Difficulty staying conscious    Fainting or passing out   Do your symptoms include a whistling sound, or wheezing, when you breathe? Select one.    No   Not selected:    Yes   Since your symptoms started, have you noticed that one or both of your eyes is bulging or poking out? Select one.    No   Not selected:    Yes   Do you have any of these symptoms in your ear(s)? Select all that apply.    None of the above   Not selected:    Pain    Pressure    Fullness    Crackling or popping    Plugged or blocked sensation   Are your tonsils larger than usual?    Yes   Not selected:    No, not that I can tell    I've had my tonsils removed   Is there redness in the back of your throat or on your tonsils? Select all that apply.    Yes, in the back of the throat    Yes, on the tonsils   Not selected:    No, not that I can see   Is there any white or yellow pus on your tonsils?    Yes   Not selected:    No, not that I can see   Are there red spots on the roof of your mouth or the back of your throat?    No, not that I can see   Not selected:    Yes   Are your glands/lymph nodes swollen, or does it hurt when you touch them?    No, not that I can tell   Not selected:    Yes   In the past 2 weeks, has anyone around you (such as at school, work, or home) had a confirmed diagnosis of strep throat? A confirmed diagnosis means that a throat swab and lab test were done to verify a strep throat infection. Select one.    Yes   Not selected:    No, not that I know of   Do you think you might have strep throat? Select  one.    Yes   Not selected:    No   In the past week, has anyone around you (such as at school, work, or home) had a confirmed diagnosis of the flu? A confirmed diagnosis means that a nose swab was done to verify a flu infection. Select all that apply.    No, not that I know of   Not selected:    I live with someone who has the flu    I've been within touching distance of someone who has the flu    I've walked by, or sat about 3 feet away from, someone who has the flu    I've been in the same building as someone who has the flu   Have you ever been diagnosed with asthma? Select one.    No   Not selected:    Yes   Have you ever been diagnosed with chronic obstructive pulmonary disease (COPD)? Select one.    No, not that I know of   Not selected:    Yes   In the past month, have you taken antibiotics for similar symptoms? Examples of antibiotics include amoxicillin, amoxicillin-clavulanate (Augmentin), penicillin, cefdinir (Omnicef), doxycycline, and clindamycin (Cleocin). Select one.    Yes (specify): Amoxicillin   Not selected:    No   In the last year, how many times were you treated with antibiotics for a sinus infection? Select one.    None   Not selected:    1 to 3 times    4 or more times   Do any of these apply to you? Select all that apply.    None of the above   Not selected:    I've been hospitalized within the last 5 days    I have diabetes    I'm in close contact with a child in    Have you been diagnosed with a deviated septum or nasal polyps? The nose is divided into two nostrils by the septum. A crooked septum is called a deviated septum. Nasal polyps are growths inside the nose or sinuses. Select one.    No, not that I know of   Not selected:    Yes, but I had surgery to treat them    Yes, I have a deviated septum    Yes, I have nasal polyps    Yes, I have a deviated septum and nasal polyps   Do you have a sore inside your nose that won't heal? Select one.    No, not that I know of   Not  selected:    Yes   Do you have allergies (pollen, dust mites, mold, animal dander)? Select one.    No, not that I know of   Not selected:    Yes   Have you had a flu shot this season? Select one.    No   Not selected:    Yes, less than 2 weeks ago    Yes, 2 to 4 weeks ago    Yes, 1 to 3 months ago    Yes, 3 to 6 months ago    Yes, more than 6 months ago   Are you pregnant? Select one.    No   Not selected:    Yes   When was your last menstrual period? If you don't currently have periods or no longer have periods, please briefly explain.    __Last Wednesday __   Within the last 2 weeks, have you: - Given birth - Had a miscarriage - Had a pregnancy loss - Had an  Being postpartum (live birth or loss) within the last 2 weeks increases your risk of flu complications. Select one.    No   Not selected:    Yes   Are you breastfeeding? Select one.    No   Not selected:    Yes   The flu and COVID-19 can be more serious for people in certain groups. The next few questions help us figure out if you or anyone you live with is at higher risk for complications from these infections. Do any of these statements apply to you? Select all that apply.    None of the above   Not selected:    I'm     I'm     I'm Black    I'm  or    Do you smoke tobacco? Select one.    No   Not selected:    Yes, every day    Yes, some days    No, I quit   Do you have a mostly inactive lifestyle? Answer yes if all of these are true: - You spend at least 6 hours a day sitting or lying down - You get less than 2 and a half hours per week of moderate exercise such as walking fast - You get less than 1 hour and 15 minutes per week of intense exercise such as jogging or running Select one.    No   Not selected:    Yes   Do you have any of these conditions? Select all that apply.    None of the above   Not selected:    Chronic lung disease, such as cystic fibrosis or interstitial fibrosis    Heart disease, such  as congenital heart disease, congestive heart failure, or coronary artery disease    Disorder of the brain, spinal cord, or nerves and muscles, such as dementia, cerebral palsy, epilepsy, muscular dystrophy, or developmental delay    Metabolic disorder or mitochondrial disease    Cerebrovascular disease, such as stroke or another condition affecting the blood vessels or blood supply to the brain    Down syndrome    Mood disorder, including depression or schizophrenia spectrum disorders    Substance use disorder, such as alcohol, opioid, or cocaine use disorder    Tuberculosis    Primary immunodeficiency   Do you live in a group care setting? Examples include: - Nursing home - Residential care - Psychiatric treatment facility - Group home - [facility] - [facility] and care home - Homeless shelter - Foster care setting Select one.    No   Not selected:    Yes   Are you a healthcare worker? Select one.    Yes   Not selected:    No   People with a very high body mass index (BMI) are at higher risk for developing complications from the flu and severe illness from COVID-19. To determine your BMI, we need to know your weight and height. Please enter your weight (in pounds).    Weight   Please enter your height.    Height   Do you have any of these conditions that can affect the immune system? Scroll to see all options. Select all that apply.    None of these   Not selected:    History of bone marrow transplant    Chronic kidney disease    Chronic liver disease (including cirrhosis)    HIV/AIDS    Inflammatory bowel disease (Crohn's disease or ulcerative colitis)    Lupus    Moderate to severe plaque psoriasis    Multiple sclerosis    Rheumatoid arthritis    Sickle cell anemia    Alpha or beta thalassemia    History of kidney, liver, heart, or other solid organ transplant    History of liver, heart, or other solid organ transplant    History of spleen removal    An autoimmune disorder not listed here (specify)    A condition  requiring treatment with long-term use of oral steroids (such as prednisone, prednisolone, or dexamethasone) (specify)   Have you ever been diagnosed with cancer? Select one.    No   Not selected:    Yes, I have cancer now    Yes, but I'm in remission   The flu and COVID-19 can be more serious for people in certain groups. Do any of these apply to the people who live with you? Select all that apply.    Over age 65   Not selected:    Under age 5            Black     or     Pregnant    Has given birth, had a miscarriage, had a pregnancy loss, or had an  in the last 2 weeks    None of the above   Does any member of your household have any of these medical conditions? Select all that apply.    Diabetes   Not selected:    Asthma    Disorders of the brain, spinal cord, or nerves and muscles, such as dementia, cerebral palsy, epilepsy, muscular dystrophy, or developmental delay    Chronic lung disease, such as COPD or cystic fibrosis    Heart disease, such as congenital heart disease, congestive heart failure, or coronary artery disease    Cerebrovascular disease, such as stroke or another condition affecting the blood vessels or blood supply to the brain    Blood disorders, such as sickle cell disease    Metabolic disorders such as inherited metabolic disorders or mitochondrial disease    Kidney disorders    Liver disorders    Weakened immune system due to illness or medications such as chemotherapy or steroids    Children under the age of 19 who are on long-term aspirin therapy    Extreme obesity (BMI > 40)    None of the above   Do you have any of these conditions? Scroll to see all options. Select all that apply.    None of the above   Not selected:    Blockage or narrowing of the blood vessels of the heart    Blood dyscrasia, such anemia, leukemia, lymphoma, or myeloma    Bone marrow depression    Catecholamine-releasing paraganglioma    Congenital long QT syndrome     Depression    Difficulty urinating or completely emptying your bladder    Uncorrected electrolyte abnormalities    Fungal infection    Gastrointestinal (GI) bleeding    Gastrointestinal (GI) obstruction    Recent heart attack    High blood pressure    Irregular heartbeat or heart rhythm    Mononucleosis (mono)    Myasthenia gravis    Parkinson's disease    Pheochromocytoma    Reye syndrome    Seizure disorder    Thyroid disease    Ulcerative colitis   Have you ever had either of these conditions? Select all that apply.    No   Not selected:    Metoclopramide-associated dystonic reaction    Tardive dyskinesia   Just a few more questions about medications, and then you're finished. Have you used any non-prescription medications or nasal sprays for your current symptoms? Examples include saline sprays, decongestants, NyQuil, and Tylenol. Select one.    No   Not selected:    Yes   Have you taken any monoamine oxidase inhibitor (MAOI) medications in the last 14 days? Examples include rasagiline (Azilect), selegiline (Eldepryl, Zelapar), isocarboxazid (Marplan), phenelzine (Nardil), and tranylcypromine (Parnate). Select one.    No, not that I know of   Not selected:    Yes   Do you take Kynmobi or Apokyn (apomorphine)? Select one.    No   Not selected:    Yes   Are you still taking these medications listed in your medical record? If you're not taking any of these, click Next. Select all that apply.    propranolol 20 MG tablet    sertraline 100 MG tablet    tiZANidine 4 MG tablet    Rosy 3-0.03 MG per tablet    amitriptyline 75 MG tablet    linaclotide 145 MCG capsule capsule   Are you taking any other medications, vitamins, or supplements? Select one.    No   Not selected:    Yes   Have you ever had an allergic or bad reaction to any medication? Select one.    Yes   Not selected:    No   Have you had an allergic or bad reaction to any of these medications? Select all that apply.    No, not that I know of   Not  "selected:    Baloxavir (Xofluza)    Benzonatate (Tessalon Perles)    Fluconazole, itraconazole, or terconazole (brands include Diflucan, Sporanox, Terazol)    Oseltamivir (Tamiflu) or zanamivir (Relenza)    Paxlovid, nirmatrelvir, or ritonavir (Norvir)   Have you had an allergic or bad reaction to any of these antibiotic medications? Select all that apply.    No, not that I know of   Not selected:    Penicillin or any \"-cillin\" antibiotic, such as amoxicillin, ampicillin, dicloxacillin, nafcillin, or piperacillin (Brands include Augmentin, Unasyn, and Zosyn)    Tetracycline or any \"-cycline\" antibiotic, such as doxycycline, demeclocycline, minocycline (Brands include Declomycin, Doryx, Dynacin, Oracea, Monodox, Panmycin, and Vibramycin)    Ciprofloxacin or any \"-floxacin\" antibiotic, such as gemifloxacin, levofloxacin, moxifloxacin, or ofloxacin (Brands include Factive, Cipro, Floxin, and Levaquin)    Cephalexin or any \"cef-\" antibiotic, such as cefazolin, cefdinir, cefuroxime, ceftriaxone, ceftazidime, or cefepime (Brands include Ancef, Ceftin, Fortaz, Keflex, Maxipime, Rocephin, and Simplicef)    Azithromycin or any \"-thromycin\" antibiotic, such as erythromycin or clarithromycin (Brands include Biaxin, Erythrocin, Z-patricia, and Zithromax)    Clindamycin or lincomycin (Brands include Cleocin and Lincocin)   Have you had an allergic or bad reaction to any of these medications? Select all that apply.    No, not that I know of   Not selected:    Albuterol or a similar medication    Atropine    Corticosteroid (steroid) medication, including topical steroids, inhaled steroids, nasal steroids, or oral steroids (budesonide, ciclesonide, dexamethasone, flunisolide, fluticasone, methylprednisolone, triamcinolone, prednisone (or brand names Alvesco, Deltasone, Flovent, Medrol, Nasacort, Rhinocort, or Veramyst)    Metoclopramide (Reglan)    Ondansetron (Zuplenz, Zofran ODT, Zofran)    Prochlorperazine (Compazine)   Have you had " an allergic or bad reaction to any of these eye drops, nasal sprays, or inhalers? Scroll to see all options. Select all that apply.    No, not that I know of   Not selected:    Azelastine (Astelin, Astepro, Optivar)    Cromolyn (Crolom, NasalCrom)    Ipratropium (Atrovent)    Ketotifen (Alaway, Zaditor)    Pheniramine/naphazoline (Naphcon-A, Opcon-A, Visine-A)    Olopatadine (Pataday, Patanol, Pazeo)   Have you had an allergic or bad reaction to any of these non-prescription medications? Scroll to see all options. Select all that apply.    No, not that I know of   Not selected:    Acetaminophen (Tylenol)    Cetirizine (Zyrtec)    Dextromethorphan (Delsym, Robitussin, Vicks DayQuil Cough)    Diphenhydramine (Benadryl)    Fexofenadine (Allegra)    Guaifenesin (Mucinex)    Dextromethorphan (Delsym)    Ibuprofen (Advil, Motrin, Midol)    Loratadine (Alavert, Claritin)    Oxymetazoline (Afrin)    Pseudoephedrine (Sudafed)   Are you allergic to milk or to the proteins found in milk (for example, whey or casein)? A milk allergy is different from lactose intolerance. Select one.    No, not that I know of   Not selected:    Yes   Have you ever had jaundice or liver problems as a result of taking amoxicillin-clavulanate (Augmentin)? Jaundice is a condition in which the skin and the whites of the eyes turn yellow. Select all that apply.    No, not that I know of   Not selected:    Yes, jaundice    Yes, liver problems   Have you ever had jaundice or liver problems as a result of taking azithromycin (Zithromax, Zmax)? Jaundice is a condition in which the skin and the whites of the eyes turn yellow. Select all that apply.    No, not that I know of   Not selected:    Yes, jaundice    Yes, liver problems   Do you need a doctor's note? A doctor's note confirms that you received care today and states when you can return to school or work. It does not contain information about your diagnosis or treatment plan. Your provider will make  the final decision on whether to give you a doctor's note and for how long. Doctor's notes CANNOT be backdated. We can't provide medical leave paperwork through this type of visit. If more paperwork is needed to request time off, contact your primary care provider. Select one.    No   Not selected:    Today only (1 day)    Today and tomorrow (2 days)    3 days    5 days    7 days   Is there anything you'd like to add about your symptoms? Please limit your comments to the symptoms covered in this interview. If you include comments about other concerns, your provider may recommend that you be seen in person.    __Almost positive it's strep. Been around a coworker that has it. __   ----------   Medical history   The following information was received from the EMR on June 18, 2024.   Allergies:    BACTRIM [SULFAMETHOXAZOLE-TRIMETHOPRIM]   - Allergy Type: Medication   - Reaction: Hives   - Severity: None   - Clinical Status: Active   - Verification Status: Confirmed    BIAXIN [CLARITHROMYCIN]   - Allergy Type: Medication   - Reaction: Hives   - Severity: None   - Clinical Status: Active   - Verification Status: Confirmed    CEPHALOSPORINS   - Allergy Type: Medication   - Reaction: Hives   - Severity: None   - Clinical Status: Active   - Verification Status: Confirmed    SULFA ANTIBIOTICS   - Allergy Type: Medication   - Reaction: Hives   - Severity: None   - Clinical Status: Active   - Verification Status: Confirmed   Medications:    BUTALBITAL-APAP-CAFF-COD -80-30 MG PO CAPS   - Route: Oral   - Start Date: October 19, 2023   - End Date: None   - Status: Active    propranolol (INDERAL) tablet   - Route: Oral   - Start Date: June 06, 2024   - End Date: None   - Status: Active    sertraline (ZOLOFT) tablet   - Route: Oral   - Start Date: November 15, 2023   - End Date: None   - Status: Active    tiZANidine (ZANAFLEX) tablet   - Route: Oral   - Start Date: October 23, 2023   - End Date: None   - Status: Active     KONSTANTIN 3-0.03 MG PO TABS   - Route: Oral   - Start Date: November 07, 2023   - End Date: None   - Status: Active    amitriptyline (ELAVIL) tablet   - Route: Oral   - Start Date: October 23, 2023   - End Date: None   - Status: Active    ondansetron (ZOFRAN-ODT) disintegrating tablet   - Route: Translingual   - Start Date: November 17, 2023   - End Date: None   - Status: Active    buPROPion (WELLBUTRIN XL) 24 hr tablet   - Route: Oral   - Start Date: February 05, 2024   - End Date: None   - Status: Active    butalbital-acetaminophen-caffeine-codeine (FIORICET WITH CODEINE) capsule -66-30 mg   - Route:   - Start Date: May 23, 2024   - End Date: None   - Status: Active    eletriptan (RELPAX) tablet   - Route: Oral   - Start Date: January 05, 2024   - End Date: None   - Status: Active    tamsulosin (FLOMAX) 24 hr capsule 0.4 mg   - Route: Oral   - Start Date: December 19, 2017   - End Date: None   - Status: Active    linaclotide (LINZESS) capsule   - Route: Oral   - Start Date: May 20, 2024   - End Date: None   - Status: Active   Problem list:    Left ureteral stone   - Category: Problem List Item   - Health Status:   - Start Date: December 19, 2017   - End Date: None   - Status: Active    Chronic tonsillitis   - Category: Problem List Item   - Health Status:   - Start Date: September 24, 2020   - End Date: None   - Status: Active    Antibiotic drug intolerance   - Category: Problem List Item   - Health Status:   - Start Date: September 24, 2020   - End Date: None   - Status: Active    Constipation   - Category: Problem List Item   - Health Status:   - Start Date: May 20, 2024   - End Date: None   - Status: Active

## 2024-06-18 NOTE — EXTERNAL PATIENT INSTRUCTIONS
Diagnosis   Strep pharyngitis (strep throat)   My name is SEN Marcelo, and I'm a healthcare provider at [facility]. I reviewed your interview, and I see that you have strep throat. This can be a painful condition, but it responds well to treatment.   About your diagnosis   Strep throat is an infection in the throat and tonsils caused by group A streptococcus bacteria. Strep throat is most often spread by droplets that are released into the air after an infected person coughs or sneezes. You can also get strep throat by sharing cups or utensils with an infected person.   Symptoms usually begin within 2 to 5 days after a person has been exposed. The pain from strep throat usually starts quickly and can be severe, especially when swallowing. Body aches and pains are common.   What to expect   If you follow this treatment plan, you should start to feel better within a few days.   Medications   Your pharmacy   [facility]/pharmacy #[id number] [address] [facility] [address] [address] [phone]     Prescription   Amoxicillin (500mg): Take 1 capsule by mouth twice a day for 10 days for infection. This medication is an antibiotic. Take it exactly as directed. You must finish the entire course of medication, even if you feel better after the first few days of treatment.    Start taking the antibiotics I've prescribed right away. You need to finish the entire course of antibiotics, even if you start to feel better before the pills run out.    Some people develop a yeast infection after taking antibiotics. If you get a yeast infection, you can treat it with antifungal creams or suppositories. These are available without a prescription at [facility] and many supermarkets.   Other treatment    Rest and drink plenty of water.    Choose throat-friendly liquids and foods, such as lemon tea, broth, applesauce, frozen yogurt, or popsicles.    Gargle with salt water several times a day to help with your throat pain.    Try cough drops and  "throat lozenges for extra relief.    Stir a teaspoon of honey into warm water or weak tea to help soothe a sore throat.    Avoid smoke and air pollution. Smoke can make infections worse.   When to seek care   Call us at [phone]   with any sudden or unexpected symptoms.    Symptoms that don't improve within 48 hours of starting antibiotics.    Symptoms that get better for a few days and then suddenly get worse.    Worsening fever.    Your throat pain becomes worse and makes swallowing extremely difficult or impossible.    Muffled voice (it may sound like you are talking with a mouthful of food).    Difficulty opening your mouth or jaw.    Stiff neck.    Joint pain, or swelling that moves from joint to joint.    Severe stomach pain.    Shortness of breath.    Nosebleed.    Severe fatigue.    A new rash.    Odd emotional or behavioral changes.    Uncontrollable body movements or \"jerkiness.\"    Severe chest pain.   Preventing the spread of respiratory viruses   COVID-19, the flu, and other respiratory illnesses can have similar symptoms. These include fever, cough, shortness of breath, fatigue, muscle or body aches, headaches, new loss of sense of taste or smell, sore throat, stuffy or runny nose, nausea or vomiting, and diarrhea. Most people with a respiratory infection have mild symptoms and can rest at home until they get better. Elderly people and those with chronic medical problems may be at risk for more serious complications. It's important to take steps to prevent the spread of respiratory illness.   When you have symptoms of a respiratory virus:   Stay home and away from others, including people you live with who aren't sick. This is called isolation. You can stop isolation and go back to normal activities when both of these are true:    You've been feeling better overall for 24 hours    You've had no fever for 24 hours, and you're not using fever-reducing medication like Tylenol or ibuprofen   Then, " for the next 5 days:    Open windows at home when possible, and use an air purifier    Consider wearing a face mask when around other people indoors    Take a COVID-19 test before being around other people indoors    Keep a physical distance from people   If you start to feel worse or get a fever again, you need to start isolation over again.   If you've had close contact with someone who has COVID, but you don't have symptoms:   You don't need to quarantine. You should take a COVID-19 test at least 5 days after the last close contact.   If you test positive for COVID but don't have symptoms:   You might be contagious. For the next 5 days:    Open windows at home when possible, and use an air purifier    Consider wearing a face mask when around other people indoors    Take a COVID-19 test before being around other people indoors    Keep a physical distance from people   Other tips to prevent the spread of respiratory viruses    Cover your mouth and nose with a tissue when you cough or sneeze. Throw used tissues in a lined trash can right away, and wash your hands immediately after.    Wash your hands often with soap and water for at least 20 seconds. If soap and water aren't available, clean your hands with a hand  that contains at least 60% alcohol. Cover all surfaces of your hands and rub them together until they feel dry.    Avoid touching your face, especially your eyes, nose, and mouth.    Clean high-touch surfaces daily. High-touch surfaces include counters, tabletops, doorknobs, bathroom fixtures, toilets, phones, keyboards, tablets, and bedside tables. You can use soap, detergents, 60%-80% ethanol or isopropyl alcohol,  such as Windex, or bleach. All of these  are effective at killing the virus that causes COVID-19.   COVID-19 vaccine information   COVID-19 vaccines are safe, effective, and free. Everyone 6 months or older can get an updated COVID-19 vaccine. Visit  www.cdc.gov/coronavirus/2019-ncov/vaccines/stay-up-to-date.html   to find out how to stay up to date with your COVID-19 vaccines. Immunocompromised people can visit www.cdc.gov/coronavirus/2019-ncov/vaccines/recommendations/immuno.html  .   Side effects such as a sore arm, tiredness, headache, and muscle pain may occur within two days of getting the vaccine and last a day or two. For the Moderna or Pfizer vaccines, side effects are more common after the second dose. People over the age of 55 are less likely to have side effects than younger people.   People who've had COVID-19 should still get vaccinated to protect themselves. It's possible to be infected by the COVID-19 virus more than once.   People who've recently had COVID-19 should wait to get vaccinated until they've recovered and completed their isolation period.   To find a COVID-19 vaccination site near you, visit www.[employer].gov/  , call 1-811.500.5454  , or text your zip code to [id number] ([employer]). Message and data rates may apply.   Flu vaccine information   Everyone 6 months of age and older should get a yearly flu vaccine. Children younger than 6 months old can't get the flu vaccine. This means infants are at high risk of serious complications from the flu. It's especially important for those who are around infants to get the flu vaccine.   It's best to get a flu shot by the end of October. Once you're vaccinated, it takes about two weeks for antibodies to develop and protect you against the flu. That's why it's important to get vaccinated as soon as possible.   Even if you don't get a flu shot by the end of October, you should still get one. As long as the flu viruses are still in your community, flu vaccines will remain available, even into January of the following year or later.   You need to get a flu shot every year. Flu viruses are constantly changing, so flu vaccines are usually updated from one season to the next. Your protection from the flu  vaccine also lessens over time.   Common side effects of the flu shot include soreness, redness and/or swelling where the shot was given, low grade fever, and aches. Common side effects of the nasal spray flu vaccine for adults include runny nose, headaches, sore throat, and cough. For children, side effects include wheezing, vomiting, muscle aches, and fever.   The flu vaccine is safe and effective. You can't get the flu from a flu vaccine.   It's safe to get the flu vaccine at the same time as a COVID-19 vaccine.   Contact your healthcare provider today for details on when and where to get your flu vaccine.   Your provider   Your diagnosis was provided by SEN Marcelo, a member of your trusted care team at [facility].   If you have any questions, call us at [phone]  .

## 2024-06-28 ENCOUNTER — TELEPHONE (OUTPATIENT)
Dept: GASTROENTEROLOGY | Facility: CLINIC | Age: 24
End: 2024-06-28
Payer: COMMERCIAL

## 2024-06-28 NOTE — TELEPHONE ENCOUNTER
Flex, call her and ask some questions. How often is she having a bm? How much miralax is she taking?  I recommend drinking plenty of water, fiber supplement daily, and exercise. Let me know. Thank you           ----- Message from Arleen CARTER sent at 6/27/2024  1:52 PM CDT -----  Regarding: FW: Linzess   Contact: [phone]    ----- Message -----  From: Sisi Gale  Sent: 6/27/2024   1:42 PM CDT  To: [facility] Clinical Pool  Subject: Linzess                                          Michelle Saucedo. The jbzess I am on right now helped for the first month, but it’s come to a hard stop. I’m having to do enemas almost every other day, even with miralax. I was wondering if I could try a higher dose or double and see if this relieves any pain and discomfort I'm having. Thank you!

## 2024-06-28 NOTE — TELEPHONE ENCOUNTER
Pt states she usually goes about 4 days then does a enema because she is so bloated. She is taking miralax 1-2 times a day. I suggested increasing to 3 times a day for a few days and then 4 times a day if needed and adjusting over several days until she has 1 good BM per day. Advised to drink lots of water, take a fiber supp, and get some exercise. She will call me back If needed and I will contact her if Sheryl has further suggestions.

## 2024-07-05 RX ORDER — PROPRANOLOL HYDROCHLORIDE 20 MG/1
20 TABLET ORAL 2 TIMES DAILY
Qty: 180 TABLET | Refills: 0 | Status: SHIPPED | OUTPATIENT
Start: 2024-07-05 | End: 2024-10-03

## 2024-07-10 DIAGNOSIS — M62.838 CERVICAL PARASPINAL MUSCLE SPASM: ICD-10-CM

## 2024-07-11 RX ORDER — AMITRIPTYLINE HYDROCHLORIDE 75 MG/1
75 TABLET ORAL NIGHTLY
Qty: 90 TABLET | Refills: 3 | Status: SHIPPED | OUTPATIENT
Start: 2024-07-11

## 2024-07-11 NOTE — TELEPHONE ENCOUNTER
Received fax from pharmacy requesting refill on pts medication(s). Pt was last seen in office on 8/18/2023  and has a follow up scheduled for Visit date not found. Will send request to  Bita Portillo  for authorization.     Requested Prescriptions     Pending Prescriptions Disp Refills    amitriptyline (ELAVIL) 75 MG tablet [Pharmacy Med Name: AMITRIPTYLINE HCL 75 MG TAB] 90 tablet 3     Sig: TAKE 1 TABLET BY MOUTH EVERY DAY AT NIGHT

## 2024-07-15 DIAGNOSIS — M54.2 NECK PAIN: ICD-10-CM

## 2024-07-15 DIAGNOSIS — M62.838 CERVICAL PARASPINAL MUSCLE SPASM: ICD-10-CM

## 2024-07-15 DIAGNOSIS — M54.12 RADICULITIS, CERVICAL: ICD-10-CM

## 2024-07-15 RX ORDER — TIZANIDINE 4 MG/1
TABLET ORAL
Qty: 60 TABLET | Refills: 3 | Status: SHIPPED | OUTPATIENT
Start: 2024-07-15

## 2024-07-15 NOTE — TELEPHONE ENCOUNTER
Received fax from pharmacy requesting refill on pts medication(s). Pt was last seen in office on 8/18/2023  and has a follow up scheduled for Visit date not found. Will send request to  Bita Portillo  for authorization.     Requested Prescriptions     Pending Prescriptions Disp Refills    tiZANidine (ZANAFLEX) 4 MG tablet [Pharmacy Med Name: TIZANIDINE HCL 4 MG TABLET] 60 tablet 3     Sig: TAKE 1/2 TO 1 TABLET BY MOUTH EVERY 8 HOURS AS NEEDED

## 2024-07-26 ENCOUNTER — HOSPITAL ENCOUNTER (OUTPATIENT)
Dept: CT IMAGING | Facility: HOSPITAL | Age: 24
Discharge: HOME | End: 2024-07-26
Admitting: NURSE PRACTITIONER
Payer: COMMERCIAL

## 2024-07-26 ENCOUNTER — OFFICE VISIT (OUTPATIENT)
Dept: INTERNAL MEDICINE | Facility: CLINIC | Age: 24
End: 2024-07-26
Payer: COMMERCIAL

## 2024-07-26 VITALS
TEMPERATURE: 98.4 F | SYSTOLIC BLOOD PRESSURE: 116 MMHG | HEART RATE: 87 BPM | DIASTOLIC BLOOD PRESSURE: 81 MMHG | BODY MASS INDEX: 33.31 KG/M2 | OXYGEN SATURATION: 99 % | HEIGHT: 63 IN | WEIGHT: 188 LBS

## 2024-07-26 DIAGNOSIS — N20.1 LEFT URETERAL STONE: ICD-10-CM

## 2024-07-26 DIAGNOSIS — R10.9 FLANK PAIN: Primary | ICD-10-CM

## 2024-07-26 DIAGNOSIS — Z87.442 HISTORY OF KIDNEY STONES: ICD-10-CM

## 2024-07-26 DIAGNOSIS — M62.838 CERVICAL PARASPINAL MUSCLE SPASM: ICD-10-CM

## 2024-07-26 DIAGNOSIS — R10.2 PELVIC PAIN: ICD-10-CM

## 2024-07-26 DIAGNOSIS — R10.9 FLANK PAIN: ICD-10-CM

## 2024-07-26 LAB
BILIRUB BLD-MCNC: NEGATIVE MG/DL
CLARITY, POC: CLEAR
COLOR UR: YELLOW
GLUCOSE UR STRIP-MCNC: NEGATIVE MG/DL
KETONES UR QL: NEGATIVE
LEUKOCYTE EST, POC: ABNORMAL
NITRITE UR-MCNC: NEGATIVE MG/ML
PH UR: 6.5 [PH] (ref 5–8)
PROT UR STRIP-MCNC: NEGATIVE MG/DL
RBC # UR STRIP: NEGATIVE /UL
SP GR UR: 1.03 (ref 1–1.03)
UROBILINOGEN UR QL: ABNORMAL

## 2024-07-26 PROCEDURE — 74176 CT ABD & PELVIS W/O CONTRAST: CPT

## 2024-07-26 PROCEDURE — 99214 OFFICE O/P EST MOD 30 MIN: CPT | Performed by: NURSE PRACTITIONER

## 2024-07-26 RX ORDER — TAMSULOSIN HYDROCHLORIDE 0.4 MG/1
1 CAPSULE ORAL NIGHTLY
Qty: 14 CAPSULE | Refills: 0 | Status: SHIPPED | OUTPATIENT
Start: 2024-07-26

## 2024-07-26 RX ORDER — ONDANSETRON 4 MG/1
4 TABLET, ORALLY DISINTEGRATING ORAL 3 TIMES DAILY PRN
Qty: 21 TABLET | Refills: 2 | Status: SHIPPED | OUTPATIENT
Start: 2024-07-26

## 2024-07-26 RX ORDER — AMITRIPTYLINE HYDROCHLORIDE 75 MG/1
75 TABLET ORAL
Qty: 30 TABLET | Refills: 3 | Status: SHIPPED | OUTPATIENT
Start: 2024-07-26

## 2024-07-26 RX ORDER — AMOXICILLIN AND CLAVULANATE POTASSIUM 875; 125 MG/1; MG/1
1 TABLET, FILM COATED ORAL 2 TIMES DAILY
Qty: 14 TABLET | Refills: 0 | Status: SHIPPED | OUTPATIENT
Start: 2024-07-26

## 2024-07-26 NOTE — PROGRESS NOTES
Subjective     Chief Complaint   Patient presents with    Flank Pain     Stabbing pain right lower back- also has cyst on ovary so is unsure if pain is related to cyst or kidney stones        History of Present Illness  The patient is a 23-year-old female who presents for evaluation of right flank pain.    She has been experiencing right flank pain for approximately a week, which has progressively worsened. The pain originated in the front and has since migrated to her back. Despite increasing her water intake, the pain persists. She has not taken any medication for the pain. Her job requires her to stand all day, but this has not alleviated the pain. She denies any presence of blood in her urine or painful urination.    She has been undergoing high colonic therapy for the past two weeks. Her bowel movements have been regular, but she experiences difficulty after each session. She continues to take MiraLAX once daily. During her last colonoscopy, she was informed that her colon was lazy. Linzess has been effective for her. She did not have a bowel movement yesterday, but this morning, she had a small bowel movement. She does not consume caffeine. She has stopped drinking energy drinks due to her heart rate. She is not pregnant.    Supplemental Information  She has been taking propranolol. She has noticed that her heart rate increases when she is anxious. She takes another propranolol at night. She usually takes another propranolol if her heart rate exceeds 110 to 115 when she is just sitting doing nothing. Her resting heart rate is 86.      Patient's PMR from outside medical facility reviewed and noted.    Otherwise complete ROS reviewed and negative except as mentioned in the HPI.    Past Medical History:   Past Medical History:   Diagnosis Date    Allergic     Anxiety      Past Surgical History:  Past Surgical History:   Procedure Laterality Date    COLONOSCOPY N/A 6/4/2024    Procedure: COLONOSCOPY WITH  ANESTHESIA;  Surgeon: Arvind Dupont MD;  Location: [facility] ENDOSCOPY;  Service: Gastroenterology;  Laterality: N/A;  preop; constipation  postop normal   PCP Thalia Avilez    HEMORRHOIDECTOMY      WISDOM TOOTH EXTRACTION       Social History:  reports that she has never smoked. She has never been exposed to tobacco smoke. She has never used smokeless tobacco. She reports that she does not drink alcohol and does not use drugs.    Family History: family history includes Colon polyps in her father; Diabetes in her father; No Known Problems in her mother.       Allergies:  Allergies   Allergen Reactions    Bactrim [Sulfamethoxazole-Trimethoprim] Hives    Biaxin [Clarithromycin] Hives    Cephalosporins Hives    Sulfa Antibiotics Hives     Medications:  Prior to Admission medications    Medication Sig Start Date End Date Taking? Authorizing Provider   amitriptyline (ELAVIL) 75 MG tablet Take 1 tablet by mouth every night at bedtime. 7/26/24  Yes Thalia Avilez APRN   eletriptan (Relpax) 20 MG tablet Take 1 tablet by mouth Daily As Needed for Migraine. may repeat in 2 hours if necessary 1/5/24  Yes Thalia Avilez APRN   linaclotide (LINZESS) 145 MCG capsule capsule Take 1 capsule by mouth Daily. Take on empty stomach, at least 30 minutes before first meal of the day. 5/20/24  Yes Sheryl Franklin APRN   ondansetron ODT (ZOFRAN-ODT) 4 MG disintegrating tablet Place 1 tablet on the tongue Every 6 (Six) Hours As Needed for Nausea or Vomiting. 11/17/23  Yes Dom Henderson APRN   propranolol (INDERAL) 20 MG tablet Take 1 tablet by mouth 2 (Two) Times a Day for 90 days. 7/5/24 10/3/24 Yes Thalia Avilez APRN   sertraline (ZOLOFT) 100 MG tablet Take 1 tablet by mouth Daily for 90 days. 11/15/23 7/26/24 Yes Dex Del Rio APRN   Rosy 3-0.03 MG per tablet Take 1 tablet by mouth Daily. 11/7/23  Yes Provider, MD Mary   tiZANidine (ZANAFLEX) 4 MG tablet Take 0.5-1 tablets by mouth Every  "8 (Eight) Hours As Needed.   Yes ProviderMary MD   tamsulosin (FLOMAX) 0.4 MG capsule 24 hr capsule Take 1 capsule by mouth Every Night.  Patient not taking: Reported on 7/26/2024 12/19/17   Reynaldo Antoine MD   amitriptyline (ELAVIL) 75 MG tablet Take 1 tablet by mouth every night at bedtime.  Patient not taking: Reported on 7/26/2024 7/26/24  Mary Sharp MD   buPROPion XL (Wellbutrin XL) 150 MG 24 hr tablet Take 1 tablet by mouth Daily.  Patient not taking: Reported on 7/26/2024 2/5/24 7/26/24  Thalia Avilez APRN   butalbital-acetaminophen-caffeine-codeine (FIORICET WITH CODEINE) -78-30 MG per capsule Take 1 capsule by mouth.  Patient not taking: Reported on 5/23/2024 12/4/23 7/26/24  Provider, Historical, MD   Butalbital-APAP-Caff-Cod -81-30 MG capsule Take 1 capsule by mouth As Needed.  Patient not taking: Reported on 7/26/2024 10/19/23 7/26/24  Mary Sharp MD       Objective     Vital Signs: /81 (BP Location: Right arm, Patient Position: Sitting, Cuff Size: Adult)   Pulse 87   Temp 98.4 °F (36.9 °C) (Infrared)   Ht 160 cm (62.99\")   Wt 85.3 kg (188 lb)   SpO2 99%   BMI 33.31 kg/m²     Physical Exam    Physical Exam  Vitals reviewed.   Constitutional:       Appearance: She is well-developed.      Comments: Appears uncomfortable.    HENT:      Head: Normocephalic and atraumatic.   Eyes:      Pupils: Pupils are equal, round, and reactive to light.   Neck:      Vascular: No JVD.   Cardiovascular:      Rate and Rhythm: Normal rate and regular rhythm.   Pulmonary:      Effort: Pulmonary effort is normal.   Abdominal:      General: Bowel sounds are normal.      Palpations: Abdomen is soft.      Tenderness: There is abdominal tenderness (right flank and right lower quadrant.).   Musculoskeletal:         General: No deformity.      Cervical back: Normal range of motion and neck supple.   Lymphadenopathy:      Cervical: No cervical adenopathy. "   Skin:     General: Skin is warm and dry.   Neurological:      General: No focal deficit present.      Mental Status: She is alert and oriented to person, place, and time.   Psychiatric:         Behavior: Behavior normal.         Thought Content: Thought content normal.         Judgment: Judgment normal.       Results Reviewed:  Glucose   Date Value Ref Range Status   04/03/2024 95 70 - 99 mg/dL Final   11/17/2023 94 65 - 99 mg/dL Final   05/06/2022 83 74 - 109 mg/dL Final     BUN   Date Value Ref Range Status   04/03/2024 8 6 - 20 mg/dL Final   11/17/2023 10 6 - 20 mg/dL Final   05/06/2022 9 6 - 20 mg/dL Final     Creatinine   Date Value Ref Range Status   04/03/2024 0.71 0.57 - 1.00 mg/dL Final   12/05/2023 0.70 0.60 - 1.30 mg/dL Final     Comment:     Serial Number: [id number]:  [id number]   05/06/2022 0.6 0.5 - 0.9 mg/dL Final     Sodium   Date Value Ref Range Status   04/03/2024 139 134 - 144 mmol/L Final   11/17/2023 138 136 - 145 mmol/L Final   05/06/2022 142 136 - 145 mmol/L Final     Potassium   Date Value Ref Range Status   04/03/2024 4.1 3.5 - 5.2 mmol/L Final   11/17/2023 3.8 3.5 - 5.2 mmol/L Final     Comment:     Slight hemolysis detected by analyzer. Result may be falsely elevated.   05/06/2022 4.2 3.5 - 5.0 mmol/L Final     Chloride   Date Value Ref Range Status   04/03/2024 100 96 - 106 mmol/L Final   11/17/2023 103 98 - 107 mmol/L Final   05/06/2022 103 98 - 111 mmol/L Final     CO2   Date Value Ref Range Status   11/17/2023 25.0 22.0 - 29.0 mmol/L Final   05/06/2022 21 (L) 22 - 29 mmol/L Final     Total CO2   Date Value Ref Range Status   04/03/2024 22 20 - 29 mmol/L Final     Calcium   Date Value Ref Range Status   04/03/2024 9.5 8.7 - 10.2 mg/dL Final   11/17/2023 9.0 8.6 - 10.5 mg/dL Final   05/06/2022 10.0 8.6 - 10.0 mg/dL Final     ALT (SGPT)   Date Value Ref Range Status   04/03/2024 17 0 - 32 IU/L Final   11/17/2023 15 1 - 33 U/L Final   05/06/2022 21 5 - 33 U/L Final     AST (SGOT)   Date  Value Ref Range Status   04/03/2024 16 0 - 40 IU/L Final   11/17/2023 18 1 - 32 U/L Final   05/06/2022 20 5 - 32 U/L Final     WBC   Date Value Ref Range Status   04/03/2024 6.8 3.4 - 10.8 x10E3/uL Final   05/06/2022 10.5 4.8 - 10.8 K/uL Final     Hematocrit   Date Value Ref Range Status   04/03/2024 41.5 34.0 - 46.6 % Final   11/17/2023 38.4 34.0 - 46.6 % Final   05/06/2022 42.0 37.0 - 47.0 % Final     Platelets   Date Value Ref Range Status   04/03/2024 284 150 - 450 x10E3/uL Final   11/17/2023 297 140 - 450 10*3/mm3 Final   05/06/2022 353 130 - 400 K/uL Final     Triglycerides   Date Value Ref Range Status   12/08/2023 129 0 - 149 mg/dL Final     HDL Cholesterol   Date Value Ref Range Status   12/08/2023 80 >39 mg/dL Final     LDL Chol Calc (NIH)   Date Value Ref Range Status   12/08/2023 93 0 - 99 mg/dL Final     Hemoglobin A1C   Date Value Ref Range Status   12/08/2023 5.7 (H) 4.8 - 5.6 % Final     Comment:              Prediabetes: 5.7 - 6.4           Diabetes: >6.4           Glycemic control for adults with diabetes: <7.0     05/06/2022 5.4 4.0 - 6.0 % Final     Comment:     HbA1c levels >6% are an indication of hyperglycemia during the preceding 2  to 3 months or longer.    HbA1c levels may reach 20% or higher in poorly controlled diabetes.  Therapeutic action is suggested at levels above 8%.    Diabetes patients with HbA1c levels below 7% meet the goal of the American  Diabetes Association.    HbA1c levels below the established reference range may indicate recent  episodes of hypoglycemia, the presence of Hb variants, or shortened lifetime  of erythrocytes.        Results  Laboratory Studies  Urine test shows trace leukocytes, no blood.      Assessment / Plan     Assessment/Plan:  Diagnoses and all orders for this visit:    1. Flank pain (Primary)  -     XR Abdomen KUB (In Office)  -     POCT urinalysis dipstick, multipro  -     CT Abdomen Pelvis Stone Protocol; Future  -     amoxicillin-clavulanate  (AUGMENTIN) 875-125 MG per tablet; Take 1 tablet by mouth 2 (Two) Times a Day.  Dispense: 14 tablet; Refill: 0    2. History of kidney stones  -     CT Abdomen Pelvis Stone Protocol; Future    3. Pelvic pain  -     CT Abdomen Pelvis Stone Protocol; Future  -     amoxicillin-clavulanate (AUGMENTIN) 875-125 MG per tablet; Take 1 tablet by mouth 2 (Two) Times a Day.  Dispense: 14 tablet; Refill: 0    4. Left ureteral stone  -     tamsulosin (Flomax) 0.4 MG capsule 24 hr capsule; Take 1 capsule by mouth Every Night.  Dispense: 14 capsule; Refill: 0    Other orders  -     amitriptyline (ELAVIL) 75 MG tablet; Take 1 tablet by mouth every night at bedtime.  Dispense: 30 tablet; Refill: 3      Assessment & Plan  1. Right flank pain.  Trace leukocytes were detected in her urine, but no blood was detected. Kidney stones were detected. A CT scan of the abdomen and pelvis was ordered.    2. Constipation.  She was advised to continue with her high colonic therapy.      No follow-ups on file. unless patient needs to be seen sooner or acute issues arise.    Code Status: Full.   Patient or patient representative verbalized consent for the use of Ambient Listening during the visit with  SEN Perez for chart documentation. 7/26/2024  17:51 CDT  I have discussed the patient results/orders and and plan/recommendation with them at today's visit.      Signed by:    ESN Perez Date: 07/26/24

## 2024-07-26 NOTE — TELEPHONE ENCOUNTER
Received fax from pharmacy requesting refill on pts medication(s). Pt was last seen in office on 8/18/2023  and has a follow up scheduled for Visit date not found. Will send request to  [facility] Portillo  for authorization.     Requested Prescriptions     Pending Prescriptions Disp Refills    ondansetron (ZOFRAN-ODT) 4 MG disintegrating tablet [Pharmacy Med Name: ONDANSETRON ODT 4 MG TABLET] 21 tablet 2     Sig: TAKE 1 TABLET BY MOUTH 3 TIMES DAILY AS NEEDED FOR NAUSEA OR VOMITING

## 2024-08-05 ENCOUNTER — OFFICE VISIT (OUTPATIENT)
Dept: INTERNAL MEDICINE | Facility: CLINIC | Age: 24
End: 2024-08-05
Payer: COMMERCIAL

## 2024-08-05 VITALS
HEIGHT: 63 IN | SYSTOLIC BLOOD PRESSURE: 119 MMHG | BODY MASS INDEX: 32.99 KG/M2 | TEMPERATURE: 98 F | HEART RATE: 93 BPM | WEIGHT: 186.2 LBS | DIASTOLIC BLOOD PRESSURE: 81 MMHG | OXYGEN SATURATION: 98 %

## 2024-08-05 DIAGNOSIS — M25.511 ACUTE PAIN OF RIGHT SHOULDER: Primary | ICD-10-CM

## 2024-08-05 PROBLEM — M79.18 CERVICAL MYOFASCIAL PAIN SYNDROME: Status: ACTIVE | Noted: 2022-06-29

## 2024-08-05 PROBLEM — G43.719 CHRONIC MIGRAINE WITHOUT AURA, INTRACTABLE, WITHOUT STATUS MIGRAINOSUS: Status: ACTIVE | Noted: 2023-03-03

## 2024-08-05 PROCEDURE — 99213 OFFICE O/P EST LOW 20 MIN: CPT | Performed by: NURSE PRACTITIONER

## 2024-08-05 RX ORDER — METHYLPREDNISOLONE 4 MG/1
TABLET ORAL
Qty: 21 TABLET | Refills: 0 | Status: SHIPPED | OUTPATIENT
Start: 2024-08-05

## 2024-08-05 RX ORDER — SERTRALINE HYDROCHLORIDE 100 MG/1
100 TABLET, FILM COATED ORAL DAILY
COMMUNITY

## 2024-08-05 NOTE — PROGRESS NOTES
Subjective     Chief Complaint   Patient presents with    Shoulder Pain     Right shoulder pain, started yesterday    Follow-up     medication       History of Present Illness  The patient is a 23-year-old female who presents for evaluation of multiple medical concerns.     She reports difficulty in raising her right arm, accompanied by a popping sensation when doing so. This issue began yesterday, with no known cause. The pain is more pronounced in the front of her shoulder. She confirms that she has been lifting heavy objects.    Her bowel movements are regular. Her current medications include Zoloft 100 mg, amitriptyline at night with a muscle relaxer, birth control in the morning, Linzess, and propranolol. She has discontinued bupropion. She is not taking Flomax. Her flank pain has improved.    Past Medical History:   Past Medical History:   Diagnosis Date    Allergic     Anxiety      Past Surgical History:  Past Surgical History:   Procedure Laterality Date    COLONOSCOPY N/A 6/4/2024    Procedure: COLONOSCOPY WITH ANESTHESIA;  Surgeon: Arvind Dupont MD;  Location: [facility] ENDOSCOPY;  Service: Gastroenterology;  Laterality: N/A;  preop; constipation  postop normal   PCP Thalia Avilez    HEMORRHOIDECTOMY      WISDOM TOOTH EXTRACTION       Social History:  reports that she has never smoked. She has never been exposed to tobacco smoke. She has never used smokeless tobacco. She reports that she does not drink alcohol and does not use drugs.    Family History: family history includes Colon polyps in her father; Diabetes in her father; No Known Problems in her mother.       Allergies:  Allergies   Allergen Reactions    Bactrim [Sulfamethoxazole-Trimethoprim] Hives    Biaxin [Clarithromycin] Hives    Cephalosporins Hives    Sulfa Antibiotics Hives     Medications:  Prior to Admission medications    Medication Sig Start Date End Date Taking? Authorizing Provider   amitriptyline (ELAVIL) 75 MG tablet Take  "1 tablet by mouth every night at bedtime. 7/26/24  Yes Thalia Avilez APRN   eletriptan (Relpax) 20 MG tablet Take 1 tablet by mouth Daily As Needed for Migraine. may repeat in 2 hours if necessary 1/5/24  Yes Thalia Avilez APRN   linaclotide (LINZESS) 145 MCG capsule capsule Take 1 capsule by mouth Daily. Take on empty stomach, at least 30 minutes before first meal of the day. 5/20/24  Yes Sheryl Franklin APRN   ondansetron ODT (ZOFRAN-ODT) 4 MG disintegrating tablet Place 1 tablet on the tongue Every 6 (Six) Hours As Needed for Nausea or Vomiting. 11/17/23  Yes Dom Henderson APRN   propranolol (INDERAL) 20 MG tablet Take 1 tablet by mouth 2 (Two) Times a Day for 90 days. 7/5/24 10/3/24 Yes Thalia Avilez APRN   sertraline (ZOLOFT) 100 MG tablet Take 1 tablet by mouth Daily.   Yes Provider, MD Mary   Rosy 3-0.03 MG per tablet Take 1 tablet by mouth Daily. 11/7/23  Yes Provider, MD Mary   tiZANidine (ZANAFLEX) 4 MG tablet Take 0.5-1 tablets by mouth Every 8 (Eight) Hours As Needed.   Yes Provider, MD Mary   sertraline (ZOLOFT) 100 MG tablet Take 1 tablet by mouth Daily for 90 days. 11/15/23 7/26/24  Dex Del Rio APRN   tamsulosin (Flomax) 0.4 MG capsule 24 hr capsule Take 1 capsule by mouth Every Night.  Patient not taking: Reported on 8/5/2024 7/26/24   Thalia Avilez APRN   amoxicillin-clavulanate (AUGMENTIN) 875-125 MG per tablet Take 1 tablet by mouth 2 (Two) Times a Day.  Patient not taking: Reported on 8/5/2024 7/26/24 8/5/24  Thalia Avilez APRN       Objective     Vital Signs: /81 (BP Location: Left arm, Patient Position: Sitting, Cuff Size: Adult)   Pulse 93   Temp 98 °F (36.7 °C) (Temporal)   Ht 160 cm (62.99\")   Wt 84.5 kg (186 lb 3.2 oz)   SpO2 98%   BMI 32.99 kg/m²     Physical Exam    Physical Exam  Vitals reviewed.   Constitutional:       Appearance: She is well-developed. She is obese.   HENT:      Head: " Normocephalic and atraumatic.   Eyes:      Pupils: Pupils are equal, round, and reactive to light.   Neck:      Vascular: No JVD.   Cardiovascular:      Rate and Rhythm: Normal rate and regular rhythm.   Pulmonary:      Effort: Pulmonary effort is normal.      Breath sounds: Normal breath sounds.   Abdominal:      General: Bowel sounds are normal.      Palpations: Abdomen is soft.   Musculoskeletal:         General: Tenderness present. No deformity. Swelling: painful passive ROM anterior shoulder tenderness..     Cervical back: Normal range of motion and neck supple.   Lymphadenopathy:      Cervical: No cervical adenopathy.   Skin:     General: Skin is warm and dry.   Neurological:      Mental Status: She is alert and oriented to person, place, and time.   Psychiatric:         Behavior: Behavior normal.         Thought Content: Thought content normal.         Judgment: Judgment normal.       Results Reviewed:  Glucose   Date Value Ref Range Status   04/03/2024 95 70 - 99 mg/dL Final   11/17/2023 94 65 - 99 mg/dL Final   05/06/2022 83 74 - 109 mg/dL Final     BUN   Date Value Ref Range Status   04/03/2024 8 6 - 20 mg/dL Final   11/17/2023 10 6 - 20 mg/dL Final   05/06/2022 9 6 - 20 mg/dL Final     Creatinine   Date Value Ref Range Status   04/03/2024 0.71 0.57 - 1.00 mg/dL Final   12/05/2023 0.70 0.60 - 1.30 mg/dL Final     Comment:     Serial Number: [id number]:  [id number]   05/06/2022 0.6 0.5 - 0.9 mg/dL Final     Sodium   Date Value Ref Range Status   04/03/2024 139 134 - 144 mmol/L Final   11/17/2023 138 136 - 145 mmol/L Final   05/06/2022 142 136 - 145 mmol/L Final     Potassium   Date Value Ref Range Status   04/03/2024 4.1 3.5 - 5.2 mmol/L Final   11/17/2023 3.8 3.5 - 5.2 mmol/L Final     Comment:     Slight hemolysis detected by analyzer. Result may be falsely elevated.   05/06/2022 4.2 3.5 - 5.0 mmol/L Final     Chloride   Date Value Ref Range Status   04/03/2024 100 96 - 106 mmol/L Final   11/17/2023 103  98 - 107 mmol/L Final   05/06/2022 103 98 - 111 mmol/L Final     CO2   Date Value Ref Range Status   11/17/2023 25.0 22.0 - 29.0 mmol/L Final   05/06/2022 21 (L) 22 - 29 mmol/L Final     Total CO2   Date Value Ref Range Status   04/03/2024 22 20 - 29 mmol/L Final     Calcium   Date Value Ref Range Status   04/03/2024 9.5 8.7 - 10.2 mg/dL Final   11/17/2023 9.0 8.6 - 10.5 mg/dL Final   05/06/2022 10.0 8.6 - 10.0 mg/dL Final     ALT (SGPT)   Date Value Ref Range Status   04/03/2024 17 0 - 32 IU/L Final   11/17/2023 15 1 - 33 U/L Final   05/06/2022 21 5 - 33 U/L Final     AST (SGOT)   Date Value Ref Range Status   04/03/2024 16 0 - 40 IU/L Final   11/17/2023 18 1 - 32 U/L Final   05/06/2022 20 5 - 32 U/L Final     WBC   Date Value Ref Range Status   04/03/2024 6.8 3.4 - 10.8 x10E3/uL Final   05/06/2022 10.5 4.8 - 10.8 K/uL Final     Hematocrit   Date Value Ref Range Status   04/03/2024 41.5 34.0 - 46.6 % Final   11/17/2023 38.4 34.0 - 46.6 % Final   05/06/2022 42.0 37.0 - 47.0 % Final     Platelets   Date Value Ref Range Status   04/03/2024 284 150 - 450 x10E3/uL Final   11/17/2023 297 140 - 450 10*3/mm3 Final   05/06/2022 353 130 - 400 K/uL Final     Triglycerides   Date Value Ref Range Status   12/08/2023 129 0 - 149 mg/dL Final     HDL Cholesterol   Date Value Ref Range Status   12/08/2023 80 >39 mg/dL Final     LDL Chol Calc (NIH)   Date Value Ref Range Status   12/08/2023 93 0 - 99 mg/dL Final     Hemoglobin A1C   Date Value Ref Range Status   12/08/2023 5.7 (H) 4.8 - 5.6 % Final     Comment:              Prediabetes: 5.7 - 6.4           Diabetes: >6.4           Glycemic control for adults with diabetes: <7.0     05/06/2022 5.4 4.0 - 6.0 % Final     Comment:     HbA1c levels >6% are an indication of hyperglycemia during the preceding 2  to 3 months or longer.    HbA1c levels may reach 20% or higher in poorly controlled diabetes.  Therapeutic action is suggested at levels above 8%.    Diabetes patients with HbA1c  levels below 7% meet the goal of the American  Diabetes Association.    HbA1c levels below the established reference range may indicate recent  episodes of hypoglycemia, the presence of Hb variants, or shortened lifetime  of erythrocytes.        Results  Imaging  CT scan showed no high stool burden or obstructing stones.      Assessment / Plan     Assessment/Plan:  Diagnoses and all orders for this visit:    1. Acute pain of right shoulder (Primary)  -     methylPREDNISolone (MEDROL) 4 MG dose pack; Take as directed on package instructions.  Dispense: 21 tablet; Refill: 0      Assessment & Plan  1. Right shoulder pain.  Steroids have been prescribed.      No follow-ups on file. unless patient needs to be seen sooner or acute issues arise.    Code Status: Full.   Patient or patient representative verbalized consent for the use of Ambient Listening during the visit with  SEN Peerz for chart documentation. 8/5/2024  15:26 CDT  I have discussed the patient results/orders and and plan/recommendation with them at today's visit.      Signed by:    SEN Perez Date: 08/05/24

## 2024-08-07 ENCOUNTER — LAB (OUTPATIENT)
Dept: INTERNAL MEDICINE | Facility: CLINIC | Age: 24
End: 2024-08-07
Payer: COMMERCIAL

## 2024-08-07 DIAGNOSIS — M25.511 ACUTE PAIN OF RIGHT SHOULDER: Primary | ICD-10-CM

## 2024-08-13 LAB
AMPHETAMINES UR QL SCN: NEGATIVE NG/ML
BARBITURATES UR QL: POSITIVE
BENZODIAZ UR QL SCN: NEGATIVE NG/ML
BZE UR QL SCN: NEGATIVE NG/ML
CANNABINOIDS UR QL SCN: NEGATIVE NG/ML
CREAT UR-MCNC: 228.1 MG/DL (ref 20–300)
FENTANYL UR-MCNC: NEGATIVE PG/ML
LABORATORY COMMENT REPORT: ABNORMAL
MEPERIDINE UR QL: NEGATIVE NG/ML
METHADONE UR QL SCN: NEGATIVE NG/ML
OPIATES UR QL SCN: POSITIVE NG/ML
OXYCODONE+OXYMORPHONE UR QL SCN: NEGATIVE NG/ML
PCP UR QL: NEGATIVE NG/ML
PH UR: 5.3 [PH] (ref 4.5–8.9)
PROPOXYPH UR QL SCN: NEGATIVE NG/ML
SP GR UR: 1.02
TRAMADOL UR QL SCN: NEGATIVE NG/ML

## 2024-08-14 NOTE — TELEPHONE ENCOUNTER
Rx Refill Note  Requested Prescriptions     Pending Prescriptions Disp Refills    fluconazole (Diflucan) 200 MG tablet       Sig: Take 1 tablet by mouth Daily.      Last office visit with prescribing clinician: 8/5/2024   Last telemedicine visit with prescribing clinician: Visit date not found   Next office visit with prescribing clinician: 11/4/2024                         Would you like a call back once the refill request has been completed: [] Yes [] No    If the office needs to give you a call back, can they leave a voicemail: [] Yes [] No    Evelyne Barron MA  08/14/24, 14:12 CDT

## 2024-08-15 RX ORDER — FLUCONAZOLE 200 MG/1
200 TABLET ORAL EVERY OTHER DAY
Qty: 6 TABLET | Refills: 1 | Status: SHIPPED | OUTPATIENT
Start: 2024-08-15

## 2024-08-19 ENCOUNTER — OFFICE VISIT (OUTPATIENT)
Dept: INTERNAL MEDICINE | Facility: CLINIC | Age: 24
End: 2024-08-19
Payer: COMMERCIAL

## 2024-08-19 VITALS
OXYGEN SATURATION: 100 % | HEIGHT: 63 IN | DIASTOLIC BLOOD PRESSURE: 80 MMHG | SYSTOLIC BLOOD PRESSURE: 112 MMHG | RESPIRATION RATE: 19 BRPM | TEMPERATURE: 97.8 F | WEIGHT: 188 LBS | HEART RATE: 88 BPM | BODY MASS INDEX: 33.31 KG/M2

## 2024-08-19 DIAGNOSIS — F41.9 SEVERE ANXIETY: Primary | ICD-10-CM

## 2024-08-19 PROCEDURE — 99213 OFFICE O/P EST LOW 20 MIN: CPT | Performed by: NURSE PRACTITIONER

## 2024-08-19 NOTE — PROGRESS NOTES
Subjective     Chief Complaint   Patient presents with    Anxiety       History of Present Illness  The patient is a 23-year-old female who presents for evaluation of anxiety.    She recently relocated and has been experiencing heightened anxiety, particularly at her workplace. She finds it difficult to sit still and feels compelled to complete tasks immediately. She suspects she may have Obsessive-Compulsive Disorder (OCD) as she tends to repeat actions and struggles to relax until everything is in order. She does not feel depressed but admits to being easily irritated by minor comments. She has been on Zoloft for an extended period and also takes amitriptyline to aid sleep. She does not experience panic attacks or chest pain.    SOCIAL HISTORY  She does not smoke pot.    Patient's PMR from outside medical facility reviewed and noted.    Otherwise complete ROS reviewed and negative except as mentioned in the HPI.    Past Medical History:   Past Medical History:   Diagnosis Date    Allergic     Anxiety      Past Surgical History:  Past Surgical History:   Procedure Laterality Date    COLONOSCOPY N/A 6/4/2024    Procedure: COLONOSCOPY WITH ANESTHESIA;  Surgeon: Arvind Dupont MD;  Location: [facility] ENDOSCOPY;  Service: Gastroenterology;  Laterality: N/A;  preop; constipation  postop normal   PCP Thalia Avilez    HEMORRHOIDECTOMY      WISDOM TOOTH EXTRACTION       Social History:  reports that she has never smoked. She has never been exposed to tobacco smoke. She has never used smokeless tobacco. She reports that she does not drink alcohol and does not use drugs.    Family History: family history includes Colon polyps in her father; Diabetes in her father; No Known Problems in her mother.       Allergies:  Allergies   Allergen Reactions    Bactrim [Sulfamethoxazole-Trimethoprim] Hives    Biaxin [Clarithromycin] Hives    Cephalosporins Hives    Sulfa Antibiotics Hives     Medications:  Prior to Admission  "medications    Medication Sig Start Date End Date Taking? Authorizing Provider   amitriptyline (ELAVIL) 75 MG tablet Take 1 tablet by mouth every night at bedtime. 7/26/24   Thalia Avilez APRN   eletriptan (Relpax) 20 MG tablet Take 1 tablet by mouth Daily As Needed for Migraine. may repeat in 2 hours if necessary 1/5/24   Thalia Avilez APRN   fluconazole (Diflucan) 200 MG tablet Take 1 tablet by mouth Every Other Day. 8/15/24   Thalia Avilez APRN   linaclotide (LINZESS) 145 MCG capsule capsule Take 1 capsule by mouth Daily. Take on empty stomach, at least 30 minutes before first meal of the day. 5/20/24   Sheryl Franklin APRN   methylPREDNISolone (MEDROL) 4 MG dose pack Take as directed on package instructions. 8/5/24   Thalia Avilez APRN   ondansetron ODT (ZOFRAN-ODT) 4 MG disintegrating tablet Place 1 tablet on the tongue Every 6 (Six) Hours As Needed for Nausea or Vomiting. 11/17/23   Dom Henderson APRN   propranolol (INDERAL) 20 MG tablet Take 1 tablet by mouth 2 (Two) Times a Day for 90 days. 7/5/24 10/3/24  Thalia Avilez APRN   sertraline (ZOLOFT) 100 MG tablet Take 1 tablet by mouth Daily.    ProviderMary MD   Rosy 3-0.03 MG per tablet Take 1 tablet by mouth Daily. 11/7/23   Mary Sharp MD   tiZANidine (ZANAFLEX) 4 MG tablet Take 0.5-1 tablets by mouth Every 8 (Eight) Hours As Needed.    ProviderMary MD       Objective     Vital Signs: /80   Pulse 88   Temp 97.8 °F (36.6 °C)   Resp 19   Ht 160 cm (62.99\")   Wt 85.3 kg (188 lb)   SpO2 100%   BMI 33.31 kg/m²     Physical Exam    Physical Exam  Vitals reviewed.   Constitutional:       Appearance: She is well-developed. She is obese.   HENT:      Head: Normocephalic and atraumatic.   Eyes:      Pupils: Pupils are equal, round, and reactive to light.   Neck:      Vascular: No JVD.   Cardiovascular:      Rate and Rhythm: Normal rate and regular rhythm.   Pulmonary: "      Effort: Pulmonary effort is normal.      Breath sounds: Normal breath sounds.   Abdominal:      General: Bowel sounds are normal.      Palpations: Abdomen is soft.   Musculoskeletal:         General: No deformity.      Cervical back: Normal range of motion and neck supple.   Lymphadenopathy:      Cervical: No cervical adenopathy.   Skin:     General: Skin is warm and dry.   Neurological:      Mental Status: She is alert and oriented to person, place, and time.   Psychiatric:         Behavior: Behavior normal.         Thought Content: Thought content normal.         Judgment: Judgment normal.                Results Reviewed:  Glucose   Date Value Ref Range Status   04/03/2024 95 70 - 99 mg/dL Final   11/17/2023 94 65 - 99 mg/dL Final   05/06/2022 83 74 - 109 mg/dL Final     BUN   Date Value Ref Range Status   04/03/2024 8 6 - 20 mg/dL Final   11/17/2023 10 6 - 20 mg/dL Final   05/06/2022 9 6 - 20 mg/dL Final     Creatinine   Date Value Ref Range Status   04/03/2024 0.71 0.57 - 1.00 mg/dL Final   12/05/2023 0.70 0.60 - 1.30 mg/dL Final     Comment:     Serial Number: [id number]:  [id number]   05/06/2022 0.6 0.5 - 0.9 mg/dL Final     Sodium   Date Value Ref Range Status   04/03/2024 139 134 - 144 mmol/L Final   11/17/2023 138 136 - 145 mmol/L Final   05/06/2022 142 136 - 145 mmol/L Final     Potassium   Date Value Ref Range Status   04/03/2024 4.1 3.5 - 5.2 mmol/L Final   11/17/2023 3.8 3.5 - 5.2 mmol/L Final     Comment:     Slight hemolysis detected by analyzer. Result may be falsely elevated.   05/06/2022 4.2 3.5 - 5.0 mmol/L Final     Chloride   Date Value Ref Range Status   04/03/2024 100 96 - 106 mmol/L Final   11/17/2023 103 98 - 107 mmol/L Final   05/06/2022 103 98 - 111 mmol/L Final     CO2   Date Value Ref Range Status   11/17/2023 25.0 22.0 - 29.0 mmol/L Final   05/06/2022 21 (L) 22 - 29 mmol/L Final     Total CO2   Date Value Ref Range Status   04/03/2024 22 20 - 29 mmol/L Final     Calcium   Date  Value Ref Range Status   04/03/2024 9.5 8.7 - 10.2 mg/dL Final   11/17/2023 9.0 8.6 - 10.5 mg/dL Final   05/06/2022 10.0 8.6 - 10.0 mg/dL Final     ALT (SGPT)   Date Value Ref Range Status   04/03/2024 17 0 - 32 IU/L Final   11/17/2023 15 1 - 33 U/L Final   05/06/2022 21 5 - 33 U/L Final     AST (SGOT)   Date Value Ref Range Status   04/03/2024 16 0 - 40 IU/L Final   11/17/2023 18 1 - 32 U/L Final   05/06/2022 20 5 - 32 U/L Final     WBC   Date Value Ref Range Status   04/03/2024 6.8 3.4 - 10.8 x10E3/uL Final   05/06/2022 10.5 4.8 - 10.8 K/uL Final     Hematocrit   Date Value Ref Range Status   04/03/2024 41.5 34.0 - 46.6 % Final   11/17/2023 38.4 34.0 - 46.6 % Final   05/06/2022 42.0 37.0 - 47.0 % Final     Platelets   Date Value Ref Range Status   04/03/2024 284 150 - 450 x10E3/uL Final   11/17/2023 297 140 - 450 10*3/mm3 Final   05/06/2022 353 130 - 400 K/uL Final     Triglycerides   Date Value Ref Range Status   12/08/2023 129 0 - 149 mg/dL Final     HDL Cholesterol   Date Value Ref Range Status   12/08/2023 80 >39 mg/dL Final     LDL Chol Calc (NIH)   Date Value Ref Range Status   12/08/2023 93 0 - 99 mg/dL Final     Hemoglobin A1C   Date Value Ref Range Status   12/08/2023 5.7 (H) 4.8 - 5.6 % Final     Comment:              Prediabetes: 5.7 - 6.4           Diabetes: >6.4           Glycemic control for adults with diabetes: <7.0     05/06/2022 5.4 4.0 - 6.0 % Final     Comment:     HbA1c levels >6% are an indication of hyperglycemia during the preceding 2  to 3 months or longer.    HbA1c levels may reach 20% or higher in poorly controlled diabetes.  Therapeutic action is suggested at levels above 8%.    Diabetes patients with HbA1c levels below 7% meet the goal of the American  Diabetes Association.    HbA1c levels below the established reference range may indicate recent  episodes of hypoglycemia, the presence of Hb variants, or shortened lifetime  of erythrocytes.        Results        Assessment / Plan      Assessment/Plan:  Diagnoses and all orders for this visit:    1. Severe anxiety (Primary)     I want her to try Vraylar for at least 2 weeks. Will see her back and go from there.   Assessment & Plan  1. Anxiety.  Symptoms include restlessness, irritability, and difficulty relaxing. She is currently on sertraline (Zoloft) and amitriptyline for sleep. A prescription for Vraylar was provided, to be taken once daily in the morning. She was advised to avoid combining marijuana with Vraylar and to start taking it every other day if she continues to use marijuana. Potential side effects, including nausea and dizziness, were discussed. Continuation of sertraline was recommended, with the possibility of reducing it in the future if symptoms improve.    Follow-up  A follow-up appointment is scheduled for 2 weeks from now.      Return in about 2 weeks (around 9/2/2024). unless patient needs to be seen sooner or acute issues arise.    Code Status: Full.   Patient or patient representative verbalized consent for the use of Ambient Listening during the visit with  SEN Perez for chart documentation. 8/19/2024  09:55 CDT  I have discussed the patient results/orders and and plan/recommendation with them at today's visit.      Signed by:    SEN Perez Date: 08/19/24

## 2024-08-30 ENCOUNTER — OFFICE VISIT (OUTPATIENT)
Dept: INTERNAL MEDICINE | Facility: CLINIC | Age: 24
End: 2024-08-30
Payer: COMMERCIAL

## 2024-08-30 VITALS
SYSTOLIC BLOOD PRESSURE: 128 MMHG | OXYGEN SATURATION: 97 % | DIASTOLIC BLOOD PRESSURE: 89 MMHG | TEMPERATURE: 98.1 F | BODY MASS INDEX: 32.78 KG/M2 | WEIGHT: 185 LBS | HEIGHT: 63 IN | RESPIRATION RATE: 17 BRPM | HEART RATE: 92 BPM

## 2024-08-30 DIAGNOSIS — F41.9 SEVERE ANXIETY: Primary | ICD-10-CM

## 2024-08-30 DIAGNOSIS — H69.93 EUSTACHIAN TUBE DISORDER, BILATERAL: ICD-10-CM

## 2024-08-30 PROCEDURE — 99213 OFFICE O/P EST LOW 20 MIN: CPT | Performed by: NURSE PRACTITIONER

## 2024-08-30 RX ORDER — DEXAMETHASONE 2 MG/1
2 TABLET ORAL 2 TIMES DAILY WITH MEALS
Qty: 6 TABLET | Refills: 0 | Status: SHIPPED | OUTPATIENT
Start: 2024-08-30

## 2024-08-30 NOTE — PROGRESS NOTES
Subjective     Chief Complaint   Patient presents with    Anxiety       History of Present Illness  The patient is a 23-year-old female who presents for evaluation of anxiety.     She has been experiencing symptoms of [place]isco and anxiety, which have improved since the initiation of Vraylar treatment. She expresses a desire to continue this medication. She also mentions that she does not use marijuana. Shehas been less OCD feeling. Even her boyfriend has noticed a change in her actions     She reports a mild sore throat, which she attributes to allergies.    Supplemental Information  She had hives with the steroid pack that was given for her shoulder.        Otherwise complete ROS reviewed and negative except as mentioned in the HPI.    Past Medical History:   Past Medical History:   Diagnosis Date    Allergic     Anxiety      Past Surgical History:  Past Surgical History:   Procedure Laterality Date    COLONOSCOPY N/A 6/4/2024    Procedure: COLONOSCOPY WITH ANESTHESIA;  Surgeon: Arvind Dupont MD;  Location: [facility] ENDOSCOPY;  Service: Gastroenterology;  Laterality: N/A;  preop; constipation  postop normal   PCP Thalia Avilez    HEMORRHOIDECTOMY      WISDOM TOOTH EXTRACTION       Social History:  reports that she has never smoked. She has never been exposed to tobacco smoke. She has never used smokeless tobacco. She reports that she does not drink alcohol and does not use drugs.    Family History: family history includes Colon polyps in her father; Diabetes in her father; No Known Problems in her mother.       Allergies:  Allergies   Allergen Reactions    Bactrim [Sulfamethoxazole-Trimethoprim] Hives    Biaxin [Clarithromycin] Hives    Cephalosporins Hives    Sulfa Antibiotics Hives     Medications:  Prior to Admission medications    Medication Sig Start Date End Date Taking? Authorizing Provider   amitriptyline (ELAVIL) 75 MG tablet Take 1 tablet by mouth every night at bedtime. 7/26/24   Raghav  "SEN Soto   eletriptan (Relpax) 20 MG tablet Take 1 tablet by mouth Daily As Needed for Migraine. may repeat in 2 hours if necessary 1/5/24   Thalia Avilez APRN   fluconazole (Diflucan) 200 MG tablet Take 1 tablet by mouth Every Other Day. 8/15/24   Thalia Avilez APRN   linaclotide (LINZESS) 145 MCG capsule capsule Take 1 capsule by mouth Daily. Take on empty stomach, at least 30 minutes before first meal of the day. 5/20/24   Sheryl Franklin APRN   methylPREDNISolone (MEDROL) 4 MG dose pack Take as directed on package instructions. 8/5/24   Thalia Avilez APRN   ondansetron ODT (ZOFRAN-ODT) 4 MG disintegrating tablet Place 1 tablet on the tongue Every 6 (Six) Hours As Needed for Nausea or Vomiting. 11/17/23   Dom Henderson APRN   propranolol (INDERAL) 20 MG tablet Take 1 tablet by mouth 2 (Two) Times a Day for 90 days. 7/5/24 10/3/24  Thalia Avilez APRN   sertraline (ZOLOFT) 100 MG tablet Take 1 tablet by mouth Daily.    Provider, MD Mary   Rosy 3-0.03 MG per tablet Take 1 tablet by mouth Daily. 11/7/23   ProviderMary MD   tiZANidine (ZANAFLEX) 4 MG tablet Take 0.5-1 tablets by mouth Every 8 (Eight) Hours As Needed.    Provider, MD Mary       Objective     Vital Signs: /89   Pulse 92   Temp 98.1 °F (36.7 °C)   Resp 17   Ht 160 cm (63\")   Wt 83.9 kg (185 lb)   SpO2 97%   BMI 32.77 kg/m²     Physical Exam  Throat appears normal. Bubbles are present in the ears.  Physical Exam  Vitals reviewed.   Constitutional:       Appearance: Normal appearance. She is well-developed.   HENT:      Head: Normocephalic and atraumatic.   Eyes:      Pupils: Pupils are equal, round, and reactive to light.   Neck:      Vascular: No JVD.   Cardiovascular:      Rate and Rhythm: Normal rate and regular rhythm.   Pulmonary:      Effort: Pulmonary effort is normal.      Breath sounds: Normal breath sounds.   Abdominal:      General: Bowel sounds " are normal.      Palpations: Abdomen is soft.   Musculoskeletal:         General: No deformity.      Cervical back: Normal range of motion and neck supple.   Lymphadenopathy:      Cervical: No cervical adenopathy.   Skin:     General: Skin is warm and dry.   Neurological:      General: No focal deficit present.      Mental Status: She is alert and oriented to person, place, and time.   Psychiatric:         Behavior: Behavior normal.         Thought Content: Thought content normal.         Judgment: Judgment normal.     Results Reviewed:  Glucose   Date Value Ref Range Status   04/03/2024 95 70 - 99 mg/dL Final   11/17/2023 94 65 - 99 mg/dL Final   05/06/2022 83 74 - 109 mg/dL Final     BUN   Date Value Ref Range Status   04/03/2024 8 6 - 20 mg/dL Final   11/17/2023 10 6 - 20 mg/dL Final   05/06/2022 9 6 - 20 mg/dL Final     Creatinine   Date Value Ref Range Status   04/03/2024 0.71 0.57 - 1.00 mg/dL Final   12/05/2023 0.70 0.60 - 1.30 mg/dL Final     Comment:     Serial Number: [id number]:  [id number]   05/06/2022 0.6 0.5 - 0.9 mg/dL Final     Sodium   Date Value Ref Range Status   04/03/2024 139 134 - 144 mmol/L Final   11/17/2023 138 136 - 145 mmol/L Final   05/06/2022 142 136 - 145 mmol/L Final     Potassium   Date Value Ref Range Status   04/03/2024 4.1 3.5 - 5.2 mmol/L Final   11/17/2023 3.8 3.5 - 5.2 mmol/L Final     Comment:     Slight hemolysis detected by analyzer. Result may be falsely elevated.   05/06/2022 4.2 3.5 - 5.0 mmol/L Final     Chloride   Date Value Ref Range Status   04/03/2024 100 96 - 106 mmol/L Final   11/17/2023 103 98 - 107 mmol/L Final   05/06/2022 103 98 - 111 mmol/L Final     CO2   Date Value Ref Range Status   11/17/2023 25.0 22.0 - 29.0 mmol/L Final   05/06/2022 21 (L) 22 - 29 mmol/L Final     Total CO2   Date Value Ref Range Status   04/03/2024 22 20 - 29 mmol/L Final     Calcium   Date Value Ref Range Status   04/03/2024 9.5 8.7 - 10.2 mg/dL Final   11/17/2023 9.0 8.6 - 10.5 mg/dL  Final   05/06/2022 10.0 8.6 - 10.0 mg/dL Final     ALT (SGPT)   Date Value Ref Range Status   04/03/2024 17 0 - 32 IU/L Final   11/17/2023 15 1 - 33 U/L Final   05/06/2022 21 5 - 33 U/L Final     AST (SGOT)   Date Value Ref Range Status   04/03/2024 16 0 - 40 IU/L Final   11/17/2023 18 1 - 32 U/L Final   05/06/2022 20 5 - 32 U/L Final     WBC   Date Value Ref Range Status   04/03/2024 6.8 3.4 - 10.8 x10E3/uL Final   05/06/2022 10.5 4.8 - 10.8 K/uL Final     Hematocrit   Date Value Ref Range Status   04/03/2024 41.5 34.0 - 46.6 % Final   11/17/2023 38.4 34.0 - 46.6 % Final   05/06/2022 42.0 37.0 - 47.0 % Final     Platelets   Date Value Ref Range Status   04/03/2024 284 150 - 450 x10E3/uL Final   11/17/2023 297 140 - 450 10*3/mm3 Final   05/06/2022 353 130 - 400 K/uL Final     Triglycerides   Date Value Ref Range Status   12/08/2023 129 0 - 149 mg/dL Final     HDL Cholesterol   Date Value Ref Range Status   12/08/2023 80 >39 mg/dL Final     LDL Chol Calc (NIH)   Date Value Ref Range Status   12/08/2023 93 0 - 99 mg/dL Final     Hemoglobin A1C   Date Value Ref Range Status   12/08/2023 5.7 (H) 4.8 - 5.6 % Final     Comment:              Prediabetes: 5.7 - 6.4           Diabetes: >6.4           Glycemic control for adults with diabetes: <7.0     05/06/2022 5.4 4.0 - 6.0 % Final     Comment:     HbA1c levels >6% are an indication of hyperglycemia during the preceding 2  to 3 months or longer.    HbA1c levels may reach 20% or higher in poorly controlled diabetes.  Therapeutic action is suggested at levels above 8%.    Diabetes patients with HbA1c levels below 7% meet the goal of the American  Diabetes Association.    HbA1c levels below the established reference range may indicate recent  episodes of hypoglycemia, the presence of Hb variants, or shortened lifetime  of erythrocytes.        Results        Assessment / Plan     Assessment/Plan:  Diagnoses and all orders for this visit:    1. Severe anxiety (Primary)  -      Cariprazine HCl (Vraylar) 1.5 MG capsule capsule; Take 1 capsule by mouth Daily.  Dispense: 30 capsule; Refill: 1    2. Eustachian tube disorder, bilateral  -     dexAMETHasone (DECADRON) 2 MG tablet; Take 1 tablet by mouth 2 (Two) Times a Day With Meals.  Dispense: 6 tablet; Refill: 0      Assessment & Plan  1. Anxiety.  She reports improvement in her anxiety symptoms with Vraylar 1.5 mg. A prescription for Vraylar 1.5 mg was provided, along with a week's supply of samples. She was advised to use alternative birth control methods during sexual activity due to the potential interaction between Vraylar and her current contraceptive.    2. Sore throat.  She reports a slight sore throat, suspected to be due to allergies. Examination revealed bubbles in her ears. A prescription for Decadron (Dexamethasone) was given, to be taken as one pill twice daily for three days. She was instructed to only fill the prescription if necessary and to monitor for any adverse reactions.            No follow-ups on file. unless patient needs to be seen sooner or acute issues arise.    Code Status: Full  Patient or patient representative verbalized consent for the use of Ambient Listening during the visit with  SEN Perez for chart documentation. 8/30/2024  15:34 CDT  I have discussed the patient results/orders and and plan/recommendation with them at today's visit.      Signed by:    SEN Perez Date: 08/30/24

## 2024-09-02 DIAGNOSIS — F33.0 MILD EPISODE OF RECURRENT MAJOR DEPRESSIVE DISORDER (HCC): ICD-10-CM

## 2024-09-02 DIAGNOSIS — F41.1 GAD (GENERALIZED ANXIETY DISORDER): ICD-10-CM

## 2024-09-03 RX ORDER — SERTRALINE HYDROCHLORIDE 100 MG/1
TABLET, FILM COATED ORAL
Qty: 135 TABLET | Refills: 3 | OUTPATIENT
Start: 2024-09-03

## 2024-09-03 NOTE — TELEPHONE ENCOUNTER
Called patient, she has switched providers. She will reach out to new provider for refill. Closing refill request.

## 2024-09-05 ENCOUNTER — TELEPHONE (OUTPATIENT)
Dept: INTERNAL MEDICINE | Facility: CLINIC | Age: 24
End: 2024-09-05
Payer: COMMERCIAL

## 2024-09-05 NOTE — TELEPHONE ENCOUNTER
Patient needs pa on Cariprazine HCl (Vraylar) 1.5 MG capsule capsule sent in to [facility] please

## 2024-09-05 NOTE — TELEPHONE ENCOUNTER
Caller: Sisi Gale    Relationship: Self    Best call back number:  [phone]     What is the best time to reach you:  ANY    Who are you requesting to speak with (clinical staff, provider,  specific staff member): CLINICAL         What was the call regarding: COST IS:  1,400 WITH A PRIOR AUTHORIZATION..  WHAT STEPS DO WE NEED TO TAKE NOW?  CAN SHE TAKE ANOTHER MEDICATION?

## 2024-09-18 ENCOUNTER — TELEPHONE (OUTPATIENT)
Dept: GASTROENTEROLOGY | Facility: CLINIC | Age: 24
End: 2024-09-18
Payer: COMMERCIAL

## 2024-09-27 RX ORDER — BUTALBITAL, ACETAMINOPHEN, CAFFEINE AND CODEINE PHOSPHATE 50; 325; 40; 30 MG/1; MG/1; MG/1; MG/1
1 CAPSULE ORAL EVERY 4 HOURS PRN
COMMUNITY
Start: 2024-08-26

## 2024-09-30 ENCOUNTER — OFFICE VISIT (OUTPATIENT)
Dept: INTERNAL MEDICINE | Facility: CLINIC | Age: 24
End: 2024-09-30
Payer: COMMERCIAL

## 2024-09-30 VITALS
RESPIRATION RATE: 19 BRPM | DIASTOLIC BLOOD PRESSURE: 87 MMHG | OXYGEN SATURATION: 98 % | TEMPERATURE: 97.8 F | HEART RATE: 78 BPM | HEIGHT: 63 IN | WEIGHT: 186 LBS | BODY MASS INDEX: 32.96 KG/M2 | SYSTOLIC BLOOD PRESSURE: 119 MMHG

## 2024-09-30 DIAGNOSIS — F41.9 SEVERE ANXIETY: Primary | ICD-10-CM

## 2024-09-30 DIAGNOSIS — K59.00 CONSTIPATION, UNSPECIFIED CONSTIPATION TYPE: ICD-10-CM

## 2024-09-30 PROCEDURE — 99213 OFFICE O/P EST LOW 20 MIN: CPT | Performed by: NURSE PRACTITIONER

## 2024-09-30 RX ORDER — PROPRANOLOL HCL 20 MG
20 TABLET ORAL 2 TIMES DAILY
Qty: 180 TABLET | Refills: 0 | Status: SHIPPED | OUTPATIENT
Start: 2024-09-30 | End: 2024-12-29

## 2024-09-30 NOTE — PROGRESS NOTES
Subjective     Chief Complaint   Patient presents with    Anxiety       History of Present Illness  The patient presents for evaluation of multiple medical concerns.    She is currently taking Linzess 145 mcg, a medication provided by her gastroenterologist in [place]. Due to the unavailability of the 145 mcg dosage, she has been doubling the dose. Her bowel movements vary daily. Recently, she took three laxatives, resulting in frequent bowel movements throughout the day, which she prefers over constipation. She is attempting to avoid enemas as they cause her abdominal discomfort. Typically, she reports having bowel movements every other day.    She is also on Vraylar 1.5 mg, which she finds beneficial. However, she is currently experiencing stress due to her job at [employer], where she works over 40 hours a week but is considered part-time. This has led to the cancellation of her insurance. She is planning to transition to [facility] next week, where she will be eligible for insurance after three months. She believes this change will improve her mental health as she often leaves work feeling irritable and in a negative mood. She describes the work environment at [employer] as hostile and negative, with insufficient staffing.    Otherwise complete ROS reviewed     Past Medical History:   Past Medical History:   Diagnosis Date    Allergic     Anxiety      Past Surgical History:  Past Surgical History:   Procedure Laterality Date    COLONOSCOPY N/A 6/4/2024    Procedure: COLONOSCOPY WITH ANESTHESIA;  Surgeon: Arvind Dupont MD;  Location: [facility] ENDOSCOPY;  Service: Gastroenterology;  Laterality: N/A;  preop; constipation  postop normal   PCP Thalia Avilez    HEMORRHOIDECTOMY      WISDOM TOOTH EXTRACTION       Social History:  reports that she has never smoked. She has never been exposed to tobacco smoke. She has never used smokeless tobacco. She reports that she does not drink alcohol and does not use drugs.    Family  History: family history includes Colon polyps in her father; Diabetes in her father; No Known Problems in her mother.       Allergies:  Allergies   Allergen Reactions    Bactrim [Sulfamethoxazole-Trimethoprim] Hives    Biaxin [Clarithromycin] Hives    Cephalosporins Hives    Sulfa Antibiotics Hives     Medications:  Prior to Admission medications    Medication Sig Start Date End Date Taking? Authorizing Provider   butalbital-acetaminophen-caffeine-codeine (FIORICET WITH CODEINE) -52-30 MG per capsule Take 1 capsule by mouth Every 4 (Four) Hours As Needed for Migraine. 8/26/24  Yes Provider, MD Mary   amitriptyline (ELAVIL) 75 MG tablet Take 1 tablet by mouth every night at bedtime. 7/26/24   Thalia Avilez APRN   Cariprazine HCl (Vraylar) 1.5 MG capsule capsule Take 1 capsule by mouth Daily. 8/30/24   Thalia Avilez APRN   dexAMETHasone (DECADRON) 2 MG tablet Take 1 tablet by mouth 2 (Two) Times a Day With Meals. 8/30/24   Thalia Avilez APRN   eletriptan (Relpax) 20 MG tablet Take 1 tablet by mouth Daily As Needed for Migraine. may repeat in 2 hours if necessary 1/5/24   Thalia Avilez APRN   fluconazole (Diflucan) 200 MG tablet Take 1 tablet by mouth Every Other Day. 8/15/24   Thalia Avilez APRN   linaclotide (LINZESS) 145 MCG capsule capsule Take 1 capsule by mouth Daily. Take on empty stomach, at least 30 minutes before first meal of the day. 5/20/24   Sheryl Franklin APRN   ondansetron ODT (ZOFRAN-ODT) 4 MG disintegrating tablet Place 1 tablet on the tongue Every 6 (Six) Hours As Needed for Nausea or Vomiting. 11/17/23   Dom Henderson APRN   propranolol (INDERAL) 20 MG tablet TAKE 1 TABLET BY MOUTH 2 (TWO) TIMES A DAY FOR 90 DAYS. 9/30/24 12/29/24  Thalia Avilez APRN   sertraline (ZOLOFT) 100 MG tablet Take 1 tablet by mouth Daily.    Provider, MD Mary   Rosy 3-0.03 MG per tablet Take 1 tablet by mouth Daily. 11/7/23    "Provider, MD Mary   tiZANidine (ZANAFLEX) 4 MG tablet Take 0.5-1 tablets by mouth Every 8 (Eight) Hours As Needed.    Provider, MD Mary   propranolol (INDERAL) 20 MG tablet Take 1 tablet by mouth 2 (Two) Times a Day for 90 days. 7/5/24 9/30/24  Thalia Avilez, SEN       Objective     Vital Signs: /87   Pulse 78   Temp 97.8 °F (36.6 °C)   Resp 19   Ht 160 cm (63\")   Wt 84.4 kg (186 lb)   SpO2 98%   BMI 32.95 kg/m²     Physical Exam    Physical Exam  Vitals reviewed.   Constitutional:       Appearance: She is well-developed.   HENT:      Head: Normocephalic and atraumatic.   Eyes:      Pupils: Pupils are equal, round, and reactive to light.   Neck:      Vascular: No JVD.   Cardiovascular:      Rate and Rhythm: Normal rate and regular rhythm.   Pulmonary:      Effort: Pulmonary effort is normal.   Abdominal:      General: Bowel sounds are normal.      Palpations: Abdomen is soft.   Musculoskeletal:         General: No deformity.      Cervical back: Normal range of motion and neck supple.   Lymphadenopathy:      Cervical: No cervical adenopathy.   Skin:     General: Skin is warm and dry.   Neurological:      Mental Status: She is alert and oriented to person, place, and time.   Psychiatric:         Behavior: Behavior normal.         Thought Content: Thought content normal.         Judgment: Judgment normal.       Results Reviewed:  Glucose   Date Value Ref Range Status   04/03/2024 95 70 - 99 mg/dL Final   11/17/2023 94 65 - 99 mg/dL Final   05/06/2022 83 74 - 109 mg/dL Final     BUN   Date Value Ref Range Status   04/03/2024 8 6 - 20 mg/dL Final   11/17/2023 10 6 - 20 mg/dL Final   05/06/2022 9 6 - 20 mg/dL Final     Creatinine   Date Value Ref Range Status   04/03/2024 0.71 0.57 - 1.00 mg/dL Final   12/05/2023 0.70 0.60 - 1.30 mg/dL Final     Comment:     Serial Number: [id number]:  [id number]   05/06/2022 0.6 0.5 - 0.9 mg/dL Final     Sodium   Date Value Ref Range Status "   04/03/2024 139 134 - 144 mmol/L Final   11/17/2023 138 136 - 145 mmol/L Final   05/06/2022 142 136 - 145 mmol/L Final     Potassium   Date Value Ref Range Status   04/03/2024 4.1 3.5 - 5.2 mmol/L Final   11/17/2023 3.8 3.5 - 5.2 mmol/L Final     Comment:     Slight hemolysis detected by analyzer. Result may be falsely elevated.   05/06/2022 4.2 3.5 - 5.0 mmol/L Final     Chloride   Date Value Ref Range Status   04/03/2024 100 96 - 106 mmol/L Final   11/17/2023 103 98 - 107 mmol/L Final   05/06/2022 103 98 - 111 mmol/L Final     CO2   Date Value Ref Range Status   11/17/2023 25.0 22.0 - 29.0 mmol/L Final   05/06/2022 21 (L) 22 - 29 mmol/L Final     Total CO2   Date Value Ref Range Status   04/03/2024 22 20 - 29 mmol/L Final     Calcium   Date Value Ref Range Status   04/03/2024 9.5 8.7 - 10.2 mg/dL Final   11/17/2023 9.0 8.6 - 10.5 mg/dL Final   05/06/2022 10.0 8.6 - 10.0 mg/dL Final     ALT (SGPT)   Date Value Ref Range Status   04/03/2024 17 0 - 32 IU/L Final   11/17/2023 15 1 - 33 U/L Final   05/06/2022 21 5 - 33 U/L Final     AST (SGOT)   Date Value Ref Range Status   04/03/2024 16 0 - 40 IU/L Final   11/17/2023 18 1 - 32 U/L Final   05/06/2022 20 5 - 32 U/L Final     WBC   Date Value Ref Range Status   04/03/2024 6.8 3.4 - 10.8 x10E3/uL Final   05/06/2022 10.5 4.8 - 10.8 K/uL Final     Hematocrit   Date Value Ref Range Status   04/03/2024 41.5 34.0 - 46.6 % Final   11/17/2023 38.4 34.0 - 46.6 % Final   05/06/2022 42.0 37.0 - 47.0 % Final     Platelets   Date Value Ref Range Status   04/03/2024 284 150 - 450 x10E3/uL Final   11/17/2023 297 140 - 450 10*3/mm3 Final   05/06/2022 353 130 - 400 K/uL Final     Triglycerides   Date Value Ref Range Status   12/08/2023 129 0 - 149 mg/dL Final     HDL Cholesterol   Date Value Ref Range Status   12/08/2023 80 >39 mg/dL Final     LDL Chol Calc (NIH)   Date Value Ref Range Status   12/08/2023 93 0 - 99 mg/dL Final     Hemoglobin A1C   Date Value Ref Range Status    12/08/2023 5.7 (H) 4.8 - 5.6 % Final     Comment:              Prediabetes: 5.7 - 6.4           Diabetes: >6.4           Glycemic control for adults with diabetes: <7.0     05/06/2022 5.4 4.0 - 6.0 % Final     Comment:     HbA1c levels >6% are an indication of hyperglycemia during the preceding 2  to 3 months or longer.    HbA1c levels may reach 20% or higher in poorly controlled diabetes.  Therapeutic action is suggested at levels above 8%.    Diabetes patients with HbA1c levels below 7% meet the goal of the American  Diabetes Association.    HbA1c levels below the established reference range may indicate recent  episodes of hypoglycemia, the presence of Hb variants, or shortened lifetime  of erythrocytes.        Results        Assessment / Plan     Assessment/Plan:  Diagnoses and all orders for this visit:    1. Severe anxiety (Primary)    2. Constipation, unspecified constipation type      Assessment & Plan  1. Constipation.  She was advised to continue with Linzess 145 mcg. She mentioned receiving samples from her colon doctor in [place] and using a lower dosage of 72 mcg when the 145 mcg was unavailable. She is trying to avoid enemas due to stomach discomfort and is managing with laxatives as needed.    2. Stress.  She is experiencing stress related to her job at [employer]. She is planning to start a new job at [employer] next week, which she believes will improve her mental health. A prescription for an additional month of Vraylar was provided. She is currently without insurance and is managing her medications with samples and her HSA.    Return in about 3 months (around 12/30/2024) for Recheck. unless patient needs to be seen sooner or acute issues arise.    Code Status: Full.   Patient or patient representative verbalized consent for the use of Ambient Listening during the visit with  SEN Perez for chart documentation. 9/30/2024  09:32 CDT    I have discussed the patient results/orders and and  plan/recommendation with them at today's visit.      Signed by:    SEN Perez Date: 09/30/24

## 2024-09-30 NOTE — TELEPHONE ENCOUNTER
Rx Refill Note  Requested Prescriptions     Pending Prescriptions Disp Refills    propranolol (INDERAL) 20 MG tablet [Pharmacy Med Name: PROPRANOLOL 20 MG TABLET] 180 tablet 0     Sig: TAKE 1 TABLET BY MOUTH 2 (TWO) TIMES A DAY FOR 90 DAYS.      Last office visit with prescribing clinician: 8/30/2024   Last telemedicine visit with prescribing clinician: Visit date not found   Next office visit with prescribing clinician: 9/30/2024                         Would you like a call back once the refill request has been completed: [] Yes [] No    If the office needs to give you a call back, can they leave a voicemail: [] Yes [] No    Brenda Del Rio RN  09/30/24, 07:14 CDT

## 2024-10-08 DIAGNOSIS — G43.E09 CHRONIC MIGRAINE WITH AURA WITHOUT STATUS MIGRAINOSUS, NOT INTRACTABLE: Primary | ICD-10-CM

## 2024-10-08 RX ORDER — BUTALBITAL, ACETAMINOPHEN, CAFFEINE AND CODEINE PHOSPHATE 50; 325; 40; 30 MG/1; MG/1; MG/1; MG/1
1 CAPSULE ORAL EVERY 4 HOURS PRN
Qty: 30 CAPSULE | Refills: 0 | Status: SHIPPED | OUTPATIENT
Start: 2024-10-08

## 2024-10-16 DIAGNOSIS — G43.E09 CHRONIC MIGRAINE WITH AURA WITHOUT STATUS MIGRAINOSUS, NOT INTRACTABLE: ICD-10-CM

## 2024-10-16 RX ORDER — ELETRIPTAN HYDROBROMIDE 20 MG/1
20 TABLET, FILM COATED ORAL DAILY PRN
Qty: 6 TABLET | Refills: 6 | Status: SHIPPED | OUTPATIENT
Start: 2024-10-16

## 2024-10-19 ENCOUNTER — E-VISIT (OUTPATIENT)
Dept: ADMINISTRATIVE | Facility: OTHER | Age: 24
End: 2024-10-19
Payer: COMMERCIAL

## 2024-10-19 ENCOUNTER — E-VISIT (OUTPATIENT)
Dept: FAMILY MEDICINE CLINIC | Facility: TELEHEALTH | Age: 24
End: 2024-10-19
Payer: COMMERCIAL

## 2024-10-19 NOTE — E-VISIT ESCALATED
"Status: Referred Out  Date: 10/19/2024 15:41:57  Acuity Level: Within 24 hours  Referral message:  We're sorry you are not feeling well. Your safety is important to us. Based on your symptoms and the information you have provided, it is possible that you may have strep throat. Further testing is required to determine if you have   strep throat.  For the most appropriate care, please be seen:   At a clinic or an urgent care  Within 24 hours   You won't be charged for this visit. We hope you feel better soon!   Patient: Sisi Gale  Patient : 2000  Patient Address: [address], [place] [address]  Patient Phone: [phone]  Clinician Response: Unavailable  Diagnosis: Unavailable  Diagnosis ICD: Unavailable     Patient Interview Questions and Responses:  Clinical Protocol: URI  Please select the reason for your visit today.: Cold, sinus infection, or flu  Please select when your first symptom started.: 1-2 days ago  Knowing exactly when the symptoms started will help the provider diagnose and treat the condition appropriately.Please enter the date your symptoms started (MM/DD/YYYY). If you do not know the exact date, please enter \"unknown\".: 10/17/2024  Do you currently have these symptoms? Nasal congestion (stuffy nose): Yes  Do you currently have these symptoms? Runny nose: No  Do you currently have these symptoms? Cough: No  Do you currently have these symptoms? Sore throat: Yes  Color helps provide description to the provider but it does not help decide if the condition is viral or bacterial or if antibiotics will be prescribed.What color is your mucus? Select all that apply. Clear: No  Color helps provide description to the provider but it does not help decide if the condition is viral or bacterial or if antibiotics will be prescribed.What color is your mucus? Select all that apply. White: No  Color helps provide description to the provider but it does not help decide if the condition is " viral or bacterial or if antibiotics will be prescribed.What color is your mucus? Select all that apply. Yellow: Yes  Color helps provide description to the provider but it does not help decide if the condition is viral or bacterial or if antibiotics will be prescribed.What color is your mucus? Select all that apply. Green: Yes  Color helps provide description to the provider but it does not help decide if the condition is viral or bacterial or if antibiotics will be prescribed.What color is your mucus? Select all that apply. Blood-tinged: No  Color helps provide description to the provider but it does not help decide if the condition is viral or bacterial or if antibiotics will be prescribed.What color is your mucus? Select all that apply. Bloody: No  Please rate the severity of your throat pain on a pain scale, with 0 being no pain and 10 being the worst pain imaginable.: 7-9 = Severe pain  Being unable to swallow any liquids, including one's own saliva, may indicate a very serious condition called epiglottitis. Please be seen in the emergency room if liquids cannot be swallowed. Can you swallow liquids?: Yes, I can swallow liquids with   mild discomfort  Please feel your neck. Are the lymph nodes in the neck enlarged?: Yes  Is the lymph node swelling worse on one side?: Yes  Has the swelling caused any changes in your speech or hoarseness in your voice?: Yes  Do you have white spots on the back of the throat?: Yes

## 2024-10-19 NOTE — E-VISIT ESCALATED
"Status: Referred Out  Date: 10/19/2024 15:36:11  Acuity Level: Within 24 hours  Referral message:  We're sorry you are not feeling well. Your safety is important to us. Based on your symptoms and the information you have provided, it is possible that you may have strep throat. Further testing is required to determine if you have   strep throat.  For the most appropriate care, please be seen:   At a clinic or an urgent care  Within 24 hours   You won't be charged for this visit. We hope you feel better soon!   Patient: Sisi Gale  Patient : 2000  Patient Address: [address], [place] [address]  Patient Phone: [phone]  Clinician Response: Unavailable  Diagnosis: Unavailable  Diagnosis ICD: Unavailable     Patient Interview Questions and Responses:  Clinical Protocol: URI  Please select the reason for your visit today.: Cold, sinus infection, or flu  Please select when your first symptom started.: 1-2 days ago  Knowing exactly when the symptoms started will help the provider diagnose and treat the condition appropriately.Please enter the date your symptoms started (MM/DD/YYYY). If you do not know the exact date, please enter \"unknown\".: 10/17/2024  Do you currently have these symptoms? Nasal congestion (stuffy nose): Yes  Do you currently have these symptoms? Runny nose: No  Do you currently have these symptoms? Cough: No  Do you currently have these symptoms? Sore throat: Yes  Color helps provide description to the provider but it does not help decide if the condition is viral or bacterial or if antibiotics will be prescribed.What color is your mucus? Select all that apply. Clear: No  Color helps provide description to the provider but it does not help decide if the condition is viral or bacterial or if antibiotics will be prescribed.What color is your mucus? Select all that apply. White: No  Color helps provide description to the provider but it does not help decide if the condition is " viral or bacterial or if antibiotics will be prescribed.What color is your mucus? Select all that apply. Yellow: Yes  Color helps provide description to the provider but it does not help decide if the condition is viral or bacterial or if antibiotics will be prescribed.What color is your mucus? Select all that apply. Green: Yes  Color helps provide description to the provider but it does not help decide if the condition is viral or bacterial or if antibiotics will be prescribed.What color is your mucus? Select all that apply. Blood-tinged: No  Color helps provide description to the provider but it does not help decide if the condition is viral or bacterial or if antibiotics will be prescribed.What color is your mucus? Select all that apply. Bloody: No  Please rate the severity of your throat pain on a pain scale, with 0 being no pain and 10 being the worst pain imaginable.: 7-9 = Severe pain  Being unable to swallow any liquids, including one's own saliva, may indicate a very serious condition called epiglottitis. Please be seen in the emergency room if liquids cannot be swallowed. Can you swallow liquids?: Yes, I can swallow liquids with   mild discomfort  Please feel your neck. Are the lymph nodes in the neck enlarged?: Yes  Is the lymph node swelling worse on one side?: Yes  Has the swelling caused any changes in your speech or hoarseness in your voice?: Yes  Do you have white spots on the back of the throat?: Yes

## 2024-10-19 NOTE — E-VISIT ESCALATED
Date: 10/19/2024 16:54:30  Clinician: Autumn Trinidad  Clinician NPI: [id number]  Patient: Sisi Gale  Patient : 2000  Patient Address: [facility] [address], [place] [address]  Patient Phone: [phone]  Visit Protocol: URI  Patient Summary:  Sisi is a 23 year old ( : 2000 ) female who initiated a visit for cold, sinus infection, or influenza.     Sisi states the symptoms started 1-2 days ago.   Symptom start date: 10/17/2024   The symptoms consist of malaise, a   sore throat, a headache, and nasal congestion. Sisi is experiencing difficulty breathing due to nasal congestion but is not short of breath. Sisi also feels feverish.   Symptom details     Nasal secretions: The color of the mucus is white.    Sore throat: Sisi reports having moderate throat pain (4-6 on a 10 point pain scale), does not have exudate on the tonsils, and can swallow liquids with mild discomfort. The lymph nodes in the neck are not enlarged. A rash has not appeared on the skin   since the sore throat started.     Temperature: Current temperature is 98 degrees Fahrenheit.     Headache: The headache is mild (1-3 on a 10 point pain scale).      Sisi denies having diarrhea, ageusia, cough, chills, anosmia, facial pain or pressure, wheezing, ear pain, rhinitis, vomiting, enlarged lymph nodes, myalgias, nausea, and teeth pain. Sisi also denies having recent facial or sinus surgery in the   past 60 days and taking antibiotic medication in the past month.   Precipitating events  Within the past week, Sisi has been exposed to someone with strep throat.    Pertinent COVID-19 (Coronavirus) information  Since the symptoms started, Sisi has   tested for COVID-19. The result of the test was negative.   Sisi has not had COVID-19 in the last 3 months.   Sisi has not received a COVID-19 vaccine in the past year.   Pertinent medical history     Sisi had 1 sinus infection within the past   year.   Sisi typically  gets a yeast infection when an antibiotic is taken. Sisi has successfully used Diflucan to treat previous yeast infections. 2 doses of fluconazole (Diflucan) has typically been needed for symptoms to resolve in the past.  A   provider has not told Sisi to avoid NSAIDs.   Sisi does not have diabetes. Sisi denies having immunosuppressive conditions (e.g., chemotherapy, HIV, organ transplant, long-term use of steroids or other immunosuppressive medications, splenectomy).   Sisi does not have asthma.   Sisi denies having chronic lung disease, cystic fibrosis, hypertension, long-term disabilities, mental health conditions, sickle cell disease or thalassemia, stroke or other cardiovascular disease, substance use   disorders, or tuberculosis (TB).  Sisi does not smoke or use smokeless tobacco. Sisi does not vape or use other e-cigarette products.   Sisi denies pregnancy and denies breastfeeding.   Additional information as reported by the patient (free text):   My niece had strep last week and I was around her.   Weight: 184 lbs (83.46 kg)  Reason for repeat visit for the same protocol within 24 hours:  Na  See the History of referred by protocol and completed visits section for details on previous visits   (visits currently in queue to be diagnosed will not appear in this section).    MEDICATIONS: butalbital-acetaminophen-caffeine-codeine oral, nitrofurantoin monohydrate/macrocrystals oral, sertraline oral, tizanidine oral, amitriptyline oral, Rosy oral, ondansetron oral, doxycycline hyclate oral, fluconazole oral, ondansetron oral,   Golytely oral, propranolol oral, ALLERGIES: sulfasalazine, cefdinir, clarithromycin, sulfamethoxazole-trimethoprim  Clinician Response:  Dear Sisi,  I am sorry you are not feeling well. Your health is our priority. Based on the information provided, a throat swab is needed to determine whether or not you have strep throat. Please be seen by a provider in a clinic or   an  urgent care in the next 1 -2 days for evaluation of strep throat.  You will not be charged for this visit. Thank you for trusting us with your care.  For the latest updates on COVID-19 (Coronavirus), please visit the Centers for Disease Control and   Prevention (CDC). Also, your state and local health department websites may provide additional guidance regarding testing and isolation recommendations for your location.   Diagnosis: Refer for additional evaluation - strep pharyngitis  Diagnosis ICD: R69

## 2024-10-19 NOTE — E-VISIT ESCALATED
"Status: Referred Out  Date: 10/19/2024 15:44:20  Acuity Level: Within 24 hours  Referral message:  We're sorry you are not feeling well. Your safety is important to us. Based on your symptoms and the information you have provided, it is possible that you may have strep throat. Further testing is required to determine if you have   strep throat.  For the most appropriate care, please be seen:   At a clinic or an urgent care  Within 24 hours   You won't be charged for this visit. We hope you feel better soon!   Patient: Sisi Gale  Patient : 2000  Patient Address: [address], [place] [address]  Patient Phone: [phone]  Clinician Response: Unavailable  Diagnosis: Unavailable  Diagnosis ICD: Unavailable     Patient Interview Questions and Responses:  Clinical Protocol: URI  Please select the reason for your visit today.: Cold, sinus infection, or flu  Please select when your first symptom started.: 1-2 days ago  Knowing exactly when the symptoms started will help the provider diagnose and treat the condition appropriately.Please enter the date your symptoms started (MM/DD/YYYY). If you do not know the exact date, please enter \"unknown\".: 10/17/2024  Do you currently have these symptoms? Nasal congestion (stuffy nose): No  Do you currently have these symptoms? Runny nose: No  Do you currently have these symptoms? Cough: No  Do you currently have these symptoms? Sore throat: Yes  Please rate the severity of your throat pain on a pain scale, with 0 being no pain and 10 being the worst pain imaginable.: 7-9 = Severe pain  Being unable to swallow any liquids, including one's own saliva, may indicate a very serious condition called epiglottitis. Please be seen in the emergency room if liquids cannot be swallowed. Can you swallow liquids?: Yes, I can swallow liquids but it   is very painful  Please feel your neck. Are the lymph nodes in the neck enlarged?: Yes  Is the lymph node swelling worse on " one side?: Yes  Has the swelling caused any changes in your speech or hoarseness in your voice?: No  Do you have white spots on the back of the throat?: Yes

## 2024-10-22 RX ORDER — FLUCONAZOLE 200 MG/1
200 TABLET ORAL EVERY OTHER DAY
Qty: 6 TABLET | Refills: 1 | Status: SHIPPED | OUTPATIENT
Start: 2024-10-22

## 2024-10-22 NOTE — TELEPHONE ENCOUNTER
Rx Refill Note  Requested Prescriptions     Pending Prescriptions Disp Refills    fluconazole (DIFLUCAN) 200 MG tablet [Pharmacy Med Name: FLUCONAZOLE 200 MG TABLET] 6 tablet 1     Sig: TAKE 1 TABLET BY MOUTH EVERY OTHER DAY      Last office visit with prescribing clinician: 9/30/2024   Last telemedicine visit with prescribing clinician: Visit date not found   Next office visit with prescribing clinician: 11/4/2024                         Would you like a call back once the refill request has been completed: [] Yes [] No    If the office needs to give you a call back, can they leave a voicemail: [] Yes [] No    Brenda Del Rio RN  10/22/24, 09:42 CDT

## 2024-10-28 DIAGNOSIS — G43.E09 CHRONIC MIGRAINE WITH AURA WITHOUT STATUS MIGRAINOSUS, NOT INTRACTABLE: ICD-10-CM

## 2024-10-28 RX ORDER — BUTALBITAL, ACETAMINOPHEN, CAFFEINE AND CODEINE PHOSPHATE 50; 325; 40; 30 MG/1; MG/1; MG/1; MG/1
1 CAPSULE ORAL EVERY 4 HOURS PRN
Qty: 30 CAPSULE | Refills: 0 | OUTPATIENT
Start: 2024-10-28

## 2024-10-28 NOTE — TELEPHONE ENCOUNTER
Caller: Sisi Gale    Relationship: Self    Best call back number: [phone]    Requested Prescriptions:   Requested Prescriptions     Pending Prescriptions Disp Refills    butalbital-acetaminophen-caffeine-codeine (FIORICET WITH CODEINE) -27-30 MG per capsule 30 capsule 0     Sig: Take 1 capsule by mouth Every 4 (Four) Hours As Needed for Migraine.        Pharmacy where request should be sent: [facility]ACY #[id number] - [place], [place] - [address] [phone] [facility] [phone]      Last office visit with prescribing clinician: 9/30/2024   Last telemedicine visit with prescribing clinician: Visit date not found   Next office visit with prescribing clinician: 11/4/2024     Additional details provided by patient:  NONE     Does the patient have less than a 3 day supply:  [x] Yes  [] No    Would you like a call back once the refill request has been completed: [x] Yes [] No    If the office needs to give you a call back, can they leave a voicemail: [x] Yes [] No    Ashlee Vazquez Rep   10/28/24 12:20 CDT

## 2024-10-29 DIAGNOSIS — G43.E09 CHRONIC MIGRAINE WITH AURA WITHOUT STATUS MIGRAINOSUS, NOT INTRACTABLE: ICD-10-CM

## 2024-10-29 RX ORDER — BUTALBITAL, ACETAMINOPHEN, CAFFEINE AND CODEINE PHOSPHATE 50; 325; 40; 30 MG/1; MG/1; MG/1; MG/1
1 CAPSULE ORAL EVERY 4 HOURS PRN
Qty: 30 CAPSULE | Refills: 0 | OUTPATIENT
Start: 2024-10-29

## 2024-10-29 RX ORDER — BUTALBITAL, ACETAMINOPHEN, CAFFEINE AND CODEINE PHOSPHATE 50; 325; 40; 30 MG/1; MG/1; MG/1; MG/1
1 CAPSULE ORAL EVERY 4 HOURS PRN
Qty: 30 CAPSULE | Refills: 0 | Status: SHIPPED | OUTPATIENT
Start: 2024-10-29

## 2024-10-29 NOTE — TELEPHONE ENCOUNTER
Rx Refill Note  Requested Prescriptions     Pending Prescriptions Disp Refills    butalbital-acetaminophen-caffeine-codeine (FIORICET WITH CODEINE) -22-30 MG per capsule 30 capsule 0     Sig: Take 1 capsule by mouth Every 4 (Four) Hours As Needed for Migraine.      Last office visit with prescribing clinician: 9/30/2024   Last telemedicine visit with prescribing clinician: Visit date not found   Next office visit with prescribing clinician: 11/4/2024                         Would you like a call back once the refill request has been completed: [] Yes [] No    If the office needs to give you a call back, can they leave a voicemail: [] Yes [] No    Veronica Peñaloza MA  10/29/24, 11:28 CDT

## 2024-11-04 ENCOUNTER — OFFICE VISIT (OUTPATIENT)
Dept: INTERNAL MEDICINE | Facility: CLINIC | Age: 24
End: 2024-11-04
Payer: COMMERCIAL

## 2024-11-04 VITALS
BODY MASS INDEX: 33.66 KG/M2 | WEIGHT: 190 LBS | HEART RATE: 88 BPM | HEIGHT: 63 IN | OXYGEN SATURATION: 98 % | DIASTOLIC BLOOD PRESSURE: 72 MMHG | RESPIRATION RATE: 18 BRPM | SYSTOLIC BLOOD PRESSURE: 103 MMHG | TEMPERATURE: 98.4 F

## 2024-11-04 DIAGNOSIS — F41.9 SEVERE ANXIETY: Primary | ICD-10-CM

## 2024-11-04 DIAGNOSIS — G43.E09 CHRONIC MIGRAINE WITH AURA WITHOUT STATUS MIGRAINOSUS, NOT INTRACTABLE: ICD-10-CM

## 2024-11-04 DIAGNOSIS — K59.09 CHRONIC CONSTIPATION: ICD-10-CM

## 2024-11-04 PROCEDURE — 99214 OFFICE O/P EST MOD 30 MIN: CPT | Performed by: NURSE PRACTITIONER

## 2024-11-04 NOTE — PROGRESS NOTES
Subjective     Chief Complaint   Patient presents with    Anxiety     Pt here for 3 month follow up.        History of Present Illness  The patient is a 23-year-old female who presents for evaluation of multiple medical concerns.    She reports that her current medication, Zoloft, is not effectively managing her symptoms. Despite increasing the dosage to 1.5 tablets daily, she still experiences significant anxiety. She also takes Vraylar and propranolol once daily, which has successfully controlled her resting heart rate. She reports no recent life stressors that could be exacerbating her anxiety. She does not endorse any suicidal ideation. 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 She has gained weight.     She continues to experience headaches. Previous treatments with Nurtec and Ubrelvy were unsuccessful. She finds relief with eletriptan, but it causes her to feel unwell. She has tried Botox injections in her neck, which initially helped, but the headaches worsened once the effects wore off. She has also tried Qulipta.    She has been prescribed Linzess 145 mcg for constipation, but she feels it is not as effective as the 240 mcg samples she was previously given.    She has not received her influenza vaccine due to previous adverse reactions.    SOCIAL HISTORY  She works at [employer].          Otherwise complete ROS reviewed and negative except as mentioned in the HPI.    Past Medical History:   Past Medical History:   Diagnosis Date    Allergic     Anxiety      Past Surgical History:  Past Surgical History:   Procedure Laterality Date    COLONOSCOPY N/A 6/4/2024    Procedure: COLONOSCOPY WITH ANESTHESIA;  Surgeon: Arvind Dupont MD;  Location: [facility] ENDOSCOPY;  Service: Gastroenterology;  Laterality: N/A;  preop; constipation  postop normal   PCP Thalia Avilez    HEMORRHOIDECTOMY      WISDOM TOOTH EXTRACTION       Social History:  reports that she has never  smoked. She has never been exposed to tobacco smoke. She has never used smokeless tobacco. She reports that she does not drink alcohol and does not use drugs.    Family History: family history includes Colon polyps in her father; Diabetes in her father; No Known Problems in her mother.       Allergies:  Allergies   Allergen Reactions    Bactrim [Sulfamethoxazole-Trimethoprim] Hives    Biaxin [Clarithromycin] Hives    Cephalosporins Hives    Sulfa Antibiotics Hives     Medications:  Prior to Admission medications    Medication Sig Start Date End Date Taking? Authorizing Provider   amitriptyline (ELAVIL) 75 MG tablet Take 1 tablet by mouth every night at bedtime. 7/26/24  Yes Thalia Avilez APRN   butalbital-acetaminophen-caffeine-codeine (FIORICET WITH CODEINE) -02-30 MG per capsule Take 1 capsule by mouth Every 4 (Four) Hours As Needed for Migraine. 10/29/24  Yes Thalia Avilez APRN   Cariprazine HCl (Vraylar) 1.5 MG capsule capsule Take 1 capsule by mouth Daily. 8/30/24  Yes Thalia Avilez APRN   dexAMETHasone (DECADRON) 2 MG tablet Take 1 tablet by mouth 2 (Two) Times a Day With Meals. 8/30/24  Yes Thalia Avilez APRN   eletriptan (RELPAX) 20 MG tablet TAKE 1 TABLET BY MOUTH DAILY AS NEEDED FOR MIGRAINE. MAY REPEAT IN 2 HOURS IF NECESSARY 10/16/24  Yes Thalia Avilez APRN   fluconazole (DIFLUCAN) 200 MG tablet TAKE 1 TABLET BY MOUTH EVERY OTHER DAY 10/22/24  Yes Thalia Avilez APRN   linaclotide (LINZESS) 145 MCG capsule capsule Take 1 capsule by mouth Daily. Take on empty stomach, at least 30 minutes before first meal of the day. 5/20/24  Yes Sheryl Franklin APRN   ondansetron ODT (ZOFRAN-ODT) 4 MG disintegrating tablet Place 1 tablet on the tongue Every 6 (Six) Hours As Needed for Nausea or Vomiting. 11/17/23  Yes Dom Henderson APRN   propranolol (INDERAL) 20 MG tablet TAKE 1 TABLET BY MOUTH 2 (TWO) TIMES A DAY FOR 90 DAYS. 9/30/24 12/29/24  "Yes Thalia Avilez APRN   sertraline (ZOLOFT) 100 MG tablet Take 1 tablet by mouth Daily.   Yes Provider, Historical, MD Gallegos 3-0.03 MG per tablet Take 1 tablet by mouth Daily. 11/7/23  Yes Provider, Mary, MD   tiZANidine (ZANAFLEX) 4 MG tablet Take 0.5-1 tablets by mouth Every 8 (Eight) Hours As Needed.   Yes Provider, Mary, MD       Objective     Vital Signs: /72 (BP Location: Left arm, Patient Position: Sitting)   Pulse 88   Temp 98.4 °F (36.9 °C) (Infrared)   Resp 18   Ht 160 cm (62.99\")   Wt 86.2 kg (190 lb)   SpO2 98%   BMI 33.67 kg/m²     Physical Exam    Physical Exam  Vitals reviewed.   Constitutional:       Appearance: She is well-developed. She is obese.   HENT:      Head: Normocephalic and atraumatic.   Eyes:      Pupils: Pupils are equal, round, and reactive to light.   Neck:      Vascular: No JVD.   Cardiovascular:      Rate and Rhythm: Normal rate and regular rhythm.   Pulmonary:      Effort: Pulmonary effort is normal.      Breath sounds: Normal breath sounds.   Abdominal:      General: Bowel sounds are normal.      Palpations: Abdomen is soft.   Musculoskeletal:         General: No deformity.      Cervical back: Normal range of motion and neck supple.   Lymphadenopathy:      Cervical: No cervical adenopathy.   Skin:     General: Skin is warm and dry.   Neurological:      Mental Status: She is alert and oriented to person, place, and time.   Psychiatric:         Behavior: Behavior normal.         Thought Content: Thought content normal.         Judgment: Judgment normal.     BMI is >= 30 and <35. (Class 1 Obesity). The following options were offered after discussion;: exercise counseling/recommendations and nutrition counseling/recommendations      Results Reviewed:  Glucose   Date Value Ref Range Status   04/03/2024 95 70 - 99 mg/dL Final   11/17/2023 94 65 - 99 mg/dL Final   05/06/2022 83 74 - 109 mg/dL Final     BUN   Date Value Ref Range Status   04/03/2024 8 " 6 - 20 mg/dL Final   11/17/2023 10 6 - 20 mg/dL Final   05/06/2022 9 6 - 20 mg/dL Final     Creatinine   Date Value Ref Range Status   04/03/2024 0.71 0.57 - 1.00 mg/dL Final   12/05/2023 0.70 0.60 - 1.30 mg/dL Final     Comment:     Serial Number: [id number]:  [id number]   05/06/2022 0.6 0.5 - 0.9 mg/dL Final     Sodium   Date Value Ref Range Status   04/03/2024 139 134 - 144 mmol/L Final   11/17/2023 138 136 - 145 mmol/L Final   05/06/2022 142 136 - 145 mmol/L Final     Potassium   Date Value Ref Range Status   04/03/2024 4.1 3.5 - 5.2 mmol/L Final   11/17/2023 3.8 3.5 - 5.2 mmol/L Final     Comment:     Slight hemolysis detected by analyzer. Result may be falsely elevated.   05/06/2022 4.2 3.5 - 5.0 mmol/L Final     Chloride   Date Value Ref Range Status   04/03/2024 100 96 - 106 mmol/L Final   11/17/2023 103 98 - 107 mmol/L Final   05/06/2022 103 98 - 111 mmol/L Final     CO2   Date Value Ref Range Status   11/17/2023 25.0 22.0 - 29.0 mmol/L Final   05/06/2022 21 (L) 22 - 29 mmol/L Final     Total CO2   Date Value Ref Range Status   04/03/2024 22 20 - 29 mmol/L Final     Calcium   Date Value Ref Range Status   04/03/2024 9.5 8.7 - 10.2 mg/dL Final   11/17/2023 9.0 8.6 - 10.5 mg/dL Final   05/06/2022 10.0 8.6 - 10.0 mg/dL Final     ALT (SGPT)   Date Value Ref Range Status   04/03/2024 17 0 - 32 IU/L Final   11/17/2023 15 1 - 33 U/L Final   05/06/2022 21 5 - 33 U/L Final     AST (SGOT)   Date Value Ref Range Status   04/03/2024 16 0 - 40 IU/L Final   11/17/2023 18 1 - 32 U/L Final   05/06/2022 20 5 - 32 U/L Final     WBC   Date Value Ref Range Status   04/03/2024 6.8 3.4 - 10.8 x10E3/uL Final   05/06/2022 10.5 4.8 - 10.8 K/uL Final     Hematocrit   Date Value Ref Range Status   04/03/2024 41.5 34.0 - 46.6 % Final   11/17/2023 38.4 34.0 - 46.6 % Final   05/06/2022 42.0 37.0 - 47.0 % Final     Platelets   Date Value Ref Range Status   04/03/2024 284 150 - 450 x10E3/uL Final   11/17/2023 297 140 - 450 10*3/mm3 Final    05/06/2022 353 130 - 400 K/uL Final     Triglycerides   Date Value Ref Range Status   12/08/2023 129 0 - 149 mg/dL Final     HDL Cholesterol   Date Value Ref Range Status   12/08/2023 80 >39 mg/dL Final     LDL Chol Calc (NIH)   Date Value Ref Range Status   12/08/2023 93 0 - 99 mg/dL Final     Hemoglobin A1C   Date Value Ref Range Status   12/08/2023 5.7 (H) 4.8 - 5.6 % Final     Comment:              Prediabetes: 5.7 - 6.4           Diabetes: >6.4           Glycemic control for adults with diabetes: <7.0     05/06/2022 5.4 4.0 - 6.0 % Final     Comment:     HbA1c levels >6% are an indication of hyperglycemia during the preceding 2  to 3 months or longer.    HbA1c levels may reach 20% or higher in poorly controlled diabetes.  Therapeutic action is suggested at levels above 8%.    Diabetes patients with HbA1c levels below 7% meet the goal of the American  Diabetes Association.    HbA1c levels below the established reference range may indicate recent  episodes of hypoglycemia, the presence of Hb variants, or shortened lifetime  of erythrocytes.        Results        Assessment / Plan     Assessment/Plan:  Diagnoses and all orders for this visit:    1. Severe anxiety (Primary)  -     Ambulatory Referral to Psychiatry    2. Chronic constipation  -     linaclotide (Linzess) 290 MCG capsule capsule; Take 1 capsule by mouth Every Morning Before Breakfast.  Dispense: 30 capsule; Refill: 6    3. Chronic migraine with aura without status migrainosus, not intractable  -     Ambulatory Referral to Neurology      Assessment & Plan  1. Anxiety.  She reports that her anxiety is significantly elevated, despite taking propranolol once daily and Vraylar. She is currently taking 150 mg of Zoloft, which does not seem to be effective. A referral to a psychiatrist will be made for further evaluation and management. Benzodiazepines are not recommended due to her age and potential dependency issues.    2. Constipation.  She reports that  the 145 mg dose of Linzess is not effective. She previously found the 290 mg dose to be more effective. A prescription for Linzess 290 mg will be considered.    3. Headaches.  She reports that eletriptan is the only medication that helps her headaches, but she is limited to six doses per month. Previous treatments with Nurtec, Ubrelvy, and Botox were either ineffective or had adverse effects. A referral to Dr. Rodriguez, a specialist in headache management, will be made for further evaluation and treatment. Fioricet is not recommended due to its barbiturate content.    4. Health Maintenance.  She has not received her flu shot due to previous reactions. She is encouraged to get the flu shot despite the mild reactions.      No follow-ups on file. unless patient needs to be seen sooner or acute issues arise.    Code Status: FULL  Patient or patient representative verbalized consent for the use of Ambient Listening during the visit with  SEN Perez for chart documentation. 11/4/2024  09:31 CST      I have discussed the patient results/orders and and plan/recommendation with them at today's visit.      Signed by:    SEN Perez Date: 11/04/24

## 2024-11-09 ENCOUNTER — TELEMEDICINE (OUTPATIENT)
Dept: FAMILY MEDICINE CLINIC | Facility: TELEHEALTH | Age: 24
End: 2024-11-09
Payer: COMMERCIAL

## 2024-11-09 ENCOUNTER — E-VISIT (OUTPATIENT)
Dept: ADMINISTRATIVE | Facility: OTHER | Age: 24
End: 2024-11-09
Payer: COMMERCIAL

## 2024-11-09 DIAGNOSIS — J02.9 SORE THROAT: Primary | ICD-10-CM

## 2024-11-09 DIAGNOSIS — Z20.818 EXPOSURE TO STREP THROAT: ICD-10-CM

## 2024-11-09 RX ORDER — AMOXICILLIN 875 MG/1
875 TABLET, COATED ORAL 2 TIMES DAILY
Qty: 20 TABLET | Refills: 0 | Status: SHIPPED | OUTPATIENT
Start: 2024-11-09 | End: 2024-11-19

## 2024-11-09 NOTE — PROGRESS NOTES
Subjective   Sisi Gale is a 23 y.o. female.     History of Present Illness  The patient presents for evaluation of strep throat.    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 As a preventive measure, she has been taking DayQuil, anticipating that she might contract the infection due to her susceptibility to strep.    She was symptom-free until Tuesday. Over the past two days, she has experienced an itchy throat, which has worsened today. She also reports feeling extremely fatigued. She had a fever of 100 degrees last night but currently does not have a fever. She has noticed white spots on both sides of her throat and swollen tonsils. Additionally, she reports swollen and tender lymph nodes on the right side of her neck.    She is unable to take Bactrim, Biaxin, cephalosporins, or sulfa, but can tolerate amoxicillin. Her medication regimen has remained unchanged for the past 5 days.    She has not taken any antibiotics recently.    The following portions of the patient's history were reviewed and updated as appropriate: allergies, current medications, past family history, past medical history, past social history, past surgical history, and problem list.    Review of Systems   Constitutional:  Positive for fever.   HENT:  Positive for sore throat and trouble swallowing.    Gastrointestinal: Negative.  Negative for diarrhea, nausea and vomiting.       Objective   Physical Exam  Constitutional:       General: She is not in acute distress.     Appearance: She is well-developed. She is not diaphoretic.   Pulmonary:      Effort: Pulmonary effort is normal.   Lymphadenopathy:      Head:      Right side of head: Tonsillar adenopathy present.   Neurological:      Mental Status: She is alert and oriented to person, place, and time.   Psychiatric:         Behavior: Behavior normal.         Assessment & Plan  1. Sore throat.    2. Exposure to strep throat.     A  10-day course of amoxicillin 875mg bid has been prescribed and sent to [facility] in [facility]. She is advised to replace her toothbrush after 1 to 2 days of starting the medication. For pain management, she can use Tylenol, ibuprofen, and warm liquids with honey. If there is no improvement in 48 hours or if symptoms worsen, she should seek immediate medical attention at a clinic.             Patient or patient representative verbalized consent for the use of Ambient Listening during the visit with  SEN Mcclelland for chart documentation. 11/9/2024  13:16 EST    The use of a video visit has been reviewed with the patient and verbal informed consent has been obtained. Myself and Sisi Gale participated in this visit. The patient is located in  [place], [place] . I am located in [place], [place]. [employer] and [employer] were utilized. I spent 10 minutes in the patient's chart for this visit.

## 2024-11-09 NOTE — E-VISIT ESCALATED
"Status: Referred Out  Date: 2024 10:11:18  Acuity Level: Within 24 hours  Referral message:  We're sorry you are not feeling well. Your safety is important to us. Based on your symptoms and the information you have provided, it is possible that you may have strep throat. Further testing is required to determine if you have   strep throat.  For the most appropriate care, please be seen:   At a clinic or an urgent care  Within 24 hours   You won't be charged for this visit. We hope you feel better soon!   Patient: Sisi Gale  Patient : 2000  Patient Address: [address], [place] [address]  Patient Phone: [phone]  Clinician Response: Unavailable  Diagnosis: Unavailable  Diagnosis ICD: Unavailable     Patient Interview Questions and Responses:  Clinical Protocol: URI  Please select the reason for your visit today.: Cold, sinus infection, or flu  Please select when your first symptom started.: 1-2 days ago  Knowing exactly when the symptoms started will help the provider diagnose and treat the condition appropriately.Please enter the date your symptoms started (MM/DD/YYYY). If you do not know the exact date, please enter \"unknown\".: 2000  Do you currently have these symptoms? Nasal congestion (stuffy nose): No  Do you currently have these symptoms? Runny nose: No  Do you currently have these symptoms? Cough: No  Do you currently have these symptoms? Sore throat: Yes  Please rate the severity of your throat pain on a pain scale, with 0 being no pain and 10 being the worst pain imaginable.: 4-6 = Moderate pain  Being unable to swallow any liquids, including one's own saliva, may indicate a very serious condition called epiglottitis. Please be seen in the emergency room if liquids cannot be swallowed. Can you swallow liquids?: Yes, I can swallow liquids with   mild discomfort  Please feel your neck. Are the lymph nodes in the neck enlarged?: Yes  Is the lymph node swelling worse on " one side?: Yes  Has the swelling caused any changes in your speech or hoarseness in your voice?: No  Do you have white spots on the back of the throat?: Yes

## 2024-11-14 ENCOUNTER — TELEPHONE (OUTPATIENT)
Dept: INTERNAL MEDICINE | Facility: CLINIC | Age: 24
End: 2024-11-14
Payer: COMMERCIAL

## 2024-11-14 NOTE — TELEPHONE ENCOUNTER
DR. PINO INS/[facility] CALLING FOR A PA ON   Cariprazine HCl (Vraylar) 1.5 MG capsule capsule   [phone]

## 2024-11-18 DIAGNOSIS — G43.E09 CHRONIC MIGRAINE WITH AURA WITHOUT STATUS MIGRAINOSUS, NOT INTRACTABLE: ICD-10-CM

## 2024-11-18 RX ORDER — BUTALBITAL, ACETAMINOPHEN, CAFFEINE AND CODEINE PHOSPHATE 50; 325; 40; 30 MG/1; MG/1; MG/1; MG/1
1 CAPSULE ORAL EVERY 4 HOURS PRN
Qty: 30 CAPSULE | Refills: 0 | OUTPATIENT
Start: 2024-11-18

## 2024-11-25 ENCOUNTER — TELEMEDICINE (OUTPATIENT)
Dept: PSYCHIATRY | Facility: CLINIC | Age: 24
End: 2024-11-25
Payer: COMMERCIAL

## 2024-11-25 DIAGNOSIS — F42.2 MIXED OBSESSIONAL THOUGHTS AND ACTS: ICD-10-CM

## 2024-11-25 DIAGNOSIS — F41.1 GENERALIZED ANXIETY DISORDER: Primary | ICD-10-CM

## 2024-11-25 RX ORDER — SERTRALINE HYDROCHLORIDE 100 MG/1
200 TABLET, FILM COATED ORAL DAILY
Qty: 60 TABLET | Refills: 0 | Status: SHIPPED | OUTPATIENT
Start: 2024-11-25

## 2024-11-25 NOTE — PROGRESS NOTES
This provider is located at the [facility]through [facility]), [address], [place] [address], using a secure eHarmonyt Video Visit through [employer]. Patient is being seen remotely via telehealth at their home address in [place], and stated they are in a secure environment for this session. The patient's condition being diagnosed/treated is appropriate for telemedicine. The provider identified herself as well as her credentials.  The patient, and/or patients guardian, consent to be seen remotely, and when consent is given they understand that the consent allows for patient identifiable information to be sent to a third party as needed.   They may refuse to be seen remotely at any time. The electronic data is encrypted and password protected, and the patient and/or guardian has been advised of the potential risks to privacy not withstanding such measures.    Mode of Visit: Video  Location of patient: -HOME-  Location of provider: +HOME+  You have chosen to receive care through a telehealth visit.  The patient has signed the video visit consent form.  The visit included audio and video interaction. No technical issues occurred during this visit.    Patient identifiers utilized: Name and date of birth.    Patient verbally confirmed consent for today's encounter:  November 26, 2024  Subjective     Sisi aGle is a 24 y.o. female who presents today for initial evaluation     Chief Complaint:    Chief Complaint   Patient presents with    OCD        History of Present Illness:    - Sisi Gale is a 24 y.o. patient presenting to clinic today for initial evaluation and management of anxiety. Patient is a referral from her PCP.  - She says she has been receiving treatment for anxiety for several years.  - She endorses having stress related to work. 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 Feel like  "this happens both at work and with her family/friends. Feels like the 'littlest thing 'can set her off'. She says that the days she doesn't work are easier for her to handle. She says that she struggles a lot with work 'constantly moving', feels like its quick to get her irritable. She says that she is 'always on edge about what people are going to ask her about'. She worries if people are going to 'cause a scene'. She says that she 'worries' over the littlest thing. She gives example over worrying over a small cut, thinking \"her finger is going to fall off\". She says she has had struggles with worries/anxiety.   - Recently diagnosed with OCD. She says that she feels like this has been going on for a long while. She says that she is very neat, likes to have things done correctly, and likes to be the one to do it. She says that she also doesn't like to wait for it. She says that she would stay up until 1-2 in the AM hanging pictures and tidying up because she doesn't want to wait. She feels unsettled until its done. She was started on Vraylar which she does feel has helped her mellow out a little bit.     Current Meds:  Vraylar: Started recently due to concerns with OCD.   Zoloft: 100 mg. She says this was helpful in the past, but doesn't feel like it is doing much anymore.     Psychiatry ROS  Mood: Describes it as 'irritable'. Feels like she used to be 'happy go duc', but now feels 'blah'. Says she she just isnt as cheerful as normal. Denies guilt, decreased energy/interest  Sleep: No acute issues   Anxiety: See HPI  Psychosis: Denies AVH, delusions, or mind playing tricks  OCD: Does endorse some compulsions, feels like she will tidy her house until tasks are completed or else she will feel uneasy. This has improved with Vraylar  Dissociations/PTSD: Denies nightmares, hypervigilance to stimuli, dissociations  Trauma: Denies  Somatic/Pain: Denies stomach pain, chest pain, or headaches  Eating/Body Image: Denies " concerns with weight, body image, restriction or purging  ODD: Denies temper tantrums, questioning rules, or refusing to listen to adults  Conduct: Denies cruelty to animals, stealing money, fire starting, or truancy      The following portions of the patient's history were reviewed and updated as appropriate: allergies, current medications, past family history, past medical history, past social history, past surgical history and problem list.    Past Psychiatric History:  Began Treatment:Started treatment in high school  Previous Diagnosis: Anxiety  Previous Psychiatrist: Denies  Therapist:Denies  Admission History:Denies  Medication Trials:   Zoloft, Lexapro, Buspar, Prozac, Wellbutrin (Did not help), Effexor (had been on this since rachelle/senior year), & Auvelity.   Self Harm:Denies  Suicide Attempts: Denies      Past Medical History:  Past Medical History:   Diagnosis Date    Allergic     Anxiety        Developmental History:  Pregnancy Complications: Born at term   Complications: Denies  Illness During Infancy: Denies  Milestones:  Grossly normal per patient    Substance Abuse History:   Types:Denies all, including illicit  Withdrawal Symptoms:Denies  Longest Period Sober:Not Applicable       Social History:  Social History     Socioeconomic History    Marital status: Single   Tobacco Use    Smoking status: Never     Passive exposure: Never    Smokeless tobacco: Never   Vaping Use    Vaping status: Never Used   Substance and Sexual Activity    Alcohol use: No    Drug use: Never    Sexual activity: Not Currently     Partners: Male     Birth control/protection: Condom, Birth control pill     Living Situation: Living in [place] with her boyfriend. Grew up in [place]. 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   Foster Care Hx: Denies  Legal Issues:  Denies  Special Education Hx: Denies  Abuse Hx: Denies    Family History:  Family History   Problem Relation Age of Onset    No Known Problems Mother     Colon polyps Father     Diabetes Father     Colon cancer Neg Hx      Family Mental Health DX:   Mother: Anxiety/OCD  MGM: Anxiety/Dementia  History of Competed Suicides: Denies    Past Surgical History:  Past Surgical History:   Procedure Laterality Date    COLONOSCOPY N/A 6/4/2024    Procedure: COLONOSCOPY WITH ANESTHESIA;  Surgeon: Arvind Dupont MD;  [facility]: [facility] ENDOSCOPY;  Service: Gastroenterology;  Laterality: N/A;  preop; constipation  postop normal   PCP Thalia Avilez    HEMORRHOIDECTOMY      WISDOM TOOTH EXTRACTION         Problem List:  Patient Active Problem List   Diagnosis    Left ureteral stone    Chronic tonsillitis    Antibiotic drug intolerance    Constipation    Cervical myofascial pain syndrome    Chronic migraine without aura, intractable, without status migrainosus       Allergy:   Allergies   Allergen Reactions    Bactrim [Sulfamethoxazole-Trimethoprim] Hives    Biaxin [Clarithromycin] Hives    Cephalosporins Hives    Sulfa Antibiotics Hives        Current Medications:   Current Outpatient Medications   Medication Sig Dispense Refill    amitriptyline (ELAVIL) 75 MG tablet Take 1 tablet by mouth every night at bedtime. 30 tablet 3    butalbital-acetaminophen-caffeine-codeine (FIORICET WITH CODEINE) -18-30 MG per capsule Take 1 capsule by mouth Every 4 (Four) Hours As Needed for Migraine. 30 capsule 0    Cariprazine HCl (Vraylar) 1.5 MG capsule capsule Take 1 capsule by mouth Daily. 30 capsule 1    eletriptan (RELPAX) 20 MG tablet TAKE 1 TABLET BY MOUTH DAILY AS NEEDED FOR MIGRAINE. MAY REPEAT IN 2 HOURS IF NECESSARY 6 tablet 6    fluconazole (DIFLUCAN) 200 MG tablet TAKE 1 TABLET BY MOUTH EVERY OTHER DAY 6 tablet 1    linaclotide (LINZESS) 145 MCG capsule capsule Take 1 capsule by mouth Daily. Take on empty stomach, at least 30  minutes before first meal of the day. 30 capsule 11    linaclotide (Linzess) 290 MCG capsule capsule Take 1 capsule by mouth Every Morning Before Breakfast. 30 capsule 6    ondansetron ODT (ZOFRAN-ODT) 4 MG disintegrating tablet Place 1 tablet on the tongue Every 6 (Six) Hours As Needed for Nausea or Vomiting. 30 tablet 0    propranolol (INDERAL) 20 MG tablet TAKE 1 TABLET BY MOUTH 2 (TWO) TIMES A DAY FOR 90 DAYS. 180 tablet 0    sertraline (Zoloft) 100 MG tablet Take 2 tablets by mouth Daily. 60 tablet 0    Rosy 3-0.03 MG per tablet Take 1 tablet by mouth Daily.      tiZANidine (ZANAFLEX) 4 MG tablet Take 0.5-1 tablets by mouth Every 8 (Eight) Hours As Needed.       No current facility-administered medications for this visit.       Review of Symptoms:    Review of Systems   Psychiatric/Behavioral:  Positive for stress. Negative for behavioral problems, sleep disturbance, suicidal ideas and depressed mood. The patient is nervous/anxious.    All other systems reviewed and are negative.      Physical Exam:   Physical Exam  Constitutional:       Appearance: Normal appearance. She is not toxic-appearing.   Neurological:      Mental Status: She is alert.   Psychiatric:         Mood and Affect: Mood normal.         Behavior: Behavior normal.         Thought Content: Thought content normal.         Judgment: Judgment normal.         Vitals:  not currently breastfeeding.   There is no height or weight on file to calculate BMI.    Last 3 Blood Pressure Readings:  BP Readings from Last 3 Encounters:   11/04/24 103/72   09/30/24 119/87   08/30/24 128/89       PHQ-9 Score:   PHQ-9 Total Score: (Patient-Rptd) 5    CHIQUIS-7 Score:   Feeling nervous, anxious or on edge: (Patient-Rptd) Several days  Not being able to stop or control worrying: (Patient-Rptd) Several days  Worrying too much about different things: (Patient-Rptd) Several days  Trouble Relaxing: (Patient-Rptd) Several days  Being so restless that it is hard to sit still:  "(Patient-Rptd) Several days  Feeling afraid as if something awful might happen: (Patient-Rptd) Several days  Becoming easily annoyed or irritable: (Patient-Rptd) Nearly every day  CHIQUIS 7 Total Score: (Patient-Rptd) 9  If you checked any problems, how difficult have these problems made it for you to do your work, take care of things at home, or get along with other people: (Patient-Rptd) Somewhat difficult     Mental Status Exam:   Hygiene:   good  Cooperation:  Cooperative  Eye Contact:  Good  Psychomotor Behavior:  Appropriate  Affect:  Full range  and Congruent  Mood: \"Irritable\"  Hopelessness: Denies  Speech:  Normal  Thought Process:  Goal directed and Linear  Thought Content:  Normal and Mood congruent  Suicidal:  None  Homicidal:  None  Hallucinations:  None  Delusion:  None  Memory:  Intact  Orientation:  Grossly intact  Reliability:  good  Insight:  Fair  Judgement:  Fair  Impulse Control:  Fair  Physical/Medical Issues:  Denies       Lab Results:   No visits with results within 3 Month(s) from this visit.   Latest known visit with results is:   Lab on 08/07/2024   Component Date Value Ref Range Status    Amphetamine, Urine Qual 08/07/2024 Negative  Pmhron=6925 ng/mL Final    Barbiturates Screen, Urine 08/07/2024 Positive (A)  Xzhwmh=725 Final    Comment: Amobarbital                 Negative            Ufhjuf=290            01  Secobarbital                Negative            Mhnxwf=418            01  Butalbital                  Positive        A                         01  Butalbital Conf, MS, UR     646                ng/mL Sailnk=358       01  Butalbital detected; this finding can be consistent with use of  medications that include Fiorinal, Fioricet, or generic formulations.  Drugs listed are representative of common sources of the compound and  are not intended to include all possible sources.  Pentobarbital               Negative            Qbbeuh=002            01  Phenobarbital               Negative   "          Qnuhfg=469            01      Benzodiazepine Screen, Urine 08/07/2024 Negative  Ojjppe=019 ng/mL Final    THC Screen, Urine 08/07/2024 Negative  Cutoff=20 ng/mL Final    Cocaine Screen, Urine 08/07/2024 Negative  Rpbtcv=587 ng/mL Final    Opiate Screen, Urine 08/07/2024 Positive (A)  Cnqoev=922 ng/mL Final    Comment: Opiate test includes Codeine, Morphine, Hydromorphone, Hydrocodone.  Codeine                     Positive        A                         01  Codeine Conf, MS, UR        >3000              ng/mL Stzdqa=338       01  Codeine detected; this finding is consistent with use of medications  that include Tylenol #2, #3, #4, Empirin #2, #3, #4, Robitussin A-C  Syrup, or numerous other trade or generic formulations containing  Codeine. Drugs listed are representative of common sources of the  compound detected and are not intended to include all possible  sources.  Morphine                    Positive        A                         01  Morphine Conf, MS, UR       1996               ng/mL Hclzau=478       01  Morphine detected; this finding is consistent with use of medications  that include Duromorph, MS-Contin, Roxanol, Daisy, or drugs  containing Codeine, or generic formulations. This drug may also be  detected from use of Heroin. Drugs listed are representative of  common sources of the                            compound detected and are not intended to  include all possible sources.  Hydromorphone               Negative            Yisrgi=694            01  Hydrocodone                 Negative            Xgnkjd=179            01      Oxycodone/Oxymorphone, Urine 08/07/2024 Negative  Ginwom=603 ng/mL Final    Test includes Oxycodone and Oxymorphone    Phencyclidine (PCP), Urine 08/07/2024 Negative  Cutoff=25 ng/mL Final    Methadone Screen, Urine 08/07/2024 Negative  Dxjqhg=441 ng/mL Final    Propoxyphene Screen 08/07/2024 Negative  Kmqjpa=599 ng/mL Final    Meperidine, Urine 08/07/2024 Negative   Softks=325 ng/mL Final    Comment: This test was developed and its performance characteristics  determined by [employer]. It has not been cleared or approved  by the Food and Drug Administration.      Fentanyl, Urine 08/07/2024 Negative  Fkodlu=6434 pg/mL Final    Comment: Test includes Fentanyl and Norfentanyl  This test was developed and its performance characteristics  determined by [employer]. It has not been cleared or approved  by the Food and Drug Administration.      Tramadol Screen, Urine 08/07/2024 Negative  Mwamjz=967 ng/mL Final    Creatinine, Urine 08/07/2024 228.1  20.0 - 300.0 mg/dL Final    Specific Gravity, UA 08/07/2024 1.018   Final    pH, UA 08/07/2024 5.3  4.5 - 8.9 Final    Please note 08/07/2024 Comment   Final    Comment: Drug test results should be interpreted in the context of clinical  information. Patient metabolic variables, specific drug chemistry, and  specimen characteristics can affect test outcome. Technical  consultation is available if a test result is inconsistent with an  expected outcome.    Email:  [email or web address]    Phone:  [phone]  Drug brands, if listed herein, are trademarks of their respective  owners.           Assessment & Plan   Diagnoses and all orders for this visit:    1. Generalized anxiety disorder (Primary)    2. Mixed obsessional thoughts and acts    Other orders  -     sertraline (Zoloft) 100 MG tablet; Take 2 tablets by mouth Daily.  Dispense: 60 tablet; Refill: 0        Visit Diagnoses:    ICD-10-CM ICD-9-CM   1. Generalized anxiety disorder  F41.1 300.02   2. Mixed obsessional thoughts and acts  F42.2 300.3       Formulation:  Sisi Gale is a 24 y.o. patient with previous dx of CHIQUIS and OCD presenting for initial evaluation and management of anxiety. Patient says that since high school, she has dealt with significant anxiety, but it has worsened in the past year in the context of significant stress at work. Struggles with  overthinking/worrying, which she feels makes her more irritable than she should be. Recently diagnosed with OCD, endoses past compulsive cleaning behaviors that have improved with Vraylar.    Discussed treatment options, patient would like to be on one medication if possible. Patient currently on Zoloft 100 mg as prescribed, though in practice has been taking 150 mg as she is taking an extra half pill at work (5 days a week). After discussion, will plan to increase Zoloft to 200 mg qday, will continue to increase if tolerated.      Plan:  #CHIQUIS #OCD  - Increase Zoloft to 200  mg qday  - Continue Vralyar at this time, will consider decrease once Zoloft is at a reliable dosage  - Discussed Serotonin Syndrome, and informed patient of red flag symptoms to monitor for, particularly since she is also taking Elavil for her headaches at this time.       Risk Assessment for Suicide/Harm To Self/Others: : Based on patient history, demographics and today's interview, Patient is considered to be at low risk for self harm/harm to others.     GOALS:  Short Term Goals: Patient will be compliant with medication, and patient will have no significant medication related side effects.  Patient will be engaged in psychotherapy as indicated.  Patient will report subjective improvement of symptoms.  Long term goals: To stabilize mood and treat/improve subjective symptoms, the patient will stay out of the hospital, the patient will be at an optimal level of functioning, and the patient will take all medications as prescribed.  The patient/guardian verbalized understanding and agreement with goals that were mutually set.      TREATMENT PLAN: Continue supportive psychotherapy efforts and medications as indicated.  Pharmacological and Non-Pharmacological treatment options discussed during today's visit. Patient/Guardian acknowledged and verbally consented with current treatment plan and was educated on the importance of compliance with  treatment and follow-up appointments.      MEDICATION ISSUES:  Discussed medication options and treatment plan of prescribed medication as well as the risks, benefits, any black box warnings, and side effects including potential falls, possible impaired driving, and metabolic adversities among others. Patient is agreeable to call the office with any worsening of symptoms or onset of side effects, or if any concerns or questions arise.  The contact information for the office is made available to the patient. Patient is agreeable to call 911 or go to the nearest ER should they begin having any SI/HI, or if any urgent concerns arise. No medication side effects or related complaints today.     MEDS ORDERED DURING VISIT:  New Medications Ordered This Visit   Medications    sertraline (Zoloft) 100 MG tablet     Sig: Take 2 tablets by mouth Daily.     Dispense:  60 tablet     Refill:  0       MEDS DISCONTINUED DURING VISIT:   Medications Discontinued During This Encounter   Medication Reason    sertraline (ZOLOFT) 100 MG tablet         Follow Up Appointment:   1 month           This document has been electronically signed by Carson Bhakta MD  November 26, 2024 10:19 EST

## 2024-12-02 ENCOUNTER — TELEMEDICINE (OUTPATIENT)
Dept: FAMILY MEDICINE CLINIC | Facility: TELEHEALTH | Age: 24
End: 2024-12-02
Payer: COMMERCIAL

## 2024-12-02 DIAGNOSIS — J02.9 ACUTE PHARYNGITIS, UNSPECIFIED ETIOLOGY: Primary | ICD-10-CM

## 2024-12-02 DIAGNOSIS — R21 RASH: ICD-10-CM

## 2024-12-02 PROCEDURE — 99213 OFFICE O/P EST LOW 20 MIN: CPT | Performed by: NURSE PRACTITIONER

## 2024-12-02 RX ORDER — AZELASTINE HYDROCHLORIDE 137 UG/1
SPRAY, METERED NASAL
COMMUNITY
Start: 2024-11-13

## 2024-12-02 RX ORDER — AMOXICILLIN 500 MG/1
500 CAPSULE ORAL 2 TIMES DAILY
Qty: 20 CAPSULE | Refills: 0 | Status: SHIPPED | OUTPATIENT
Start: 2024-12-02 | End: 2024-12-12

## 2024-12-02 NOTE — PROGRESS NOTES
CHIEF COMPLAINT  Chief Complaint   Patient presents with    Sore Throat         HPI  Sisi Gale is a 24 y.o. female  presents with complaint of sore throat for 1 days with swollen and tender lymph nodes. Her mother tested positive for strep this morning.   She also has a lesion on her right arm that has been there for 4 days. It looks like a small ringworm to her. She has been using hydrocortisone on it. Yesterday, she bought an over the counter antifungal.     Review of Systems   Constitutional:  Positive for chills, fatigue and fever. Negative for diaphoresis.   HENT:  Positive for sore throat. Negative for congestion, sinus pressure, sinus pain and sneezing.    Respiratory:  Negative for cough.    Gastrointestinal:  Negative for diarrhea, nausea and vomiting.   Musculoskeletal:  Negative for myalgias.   Skin:  Positive for rash (small oval area of erythema.).   Neurological:  Positive for headaches.       Past Medical History:   Diagnosis Date    Allergic     Anxiety        Family History   Problem Relation Age of Onset    No Known Problems Mother     Colon polyps Father     Diabetes Father     Colon cancer Neg Hx        Social History     Socioeconomic History    Marital status: Single   Tobacco Use    Smoking status: Never     Passive exposure: Never    Smokeless tobacco: Never   Vaping Use    Vaping status: Never Used   Substance and Sexual Activity    Alcohol use: No    Drug use: Never    Sexual activity: Not Currently     Partners: Male     Birth control/protection: Condom, Birth control pill         LMP 11/28/2024 (Approximate)   Breastfeeding No     PHYSICAL EXAM  Physical Exam   Constitutional: She is oriented to person, place, and time. She appears well-developed and well-nourished. She does not have a sickly appearance. She does not appear ill. No distress.   HENT:   Head: Normocephalic and atraumatic.   Mouth/Throat: Mouth/Lips are normal.Uvula is midline and oropharynx is clear and moist. Mucous  membranes are not pale, not dry, not cyanotic and erythematous. No tonsillar exudate. white patches.blistered.  Eyes: EOM are normal.   Pulmonary/Chest: Effort normal.  No respiratory distress.  Lymphadenopathy:     She has cervical adenopathy (per patient).   Neurological: She is alert and oriented to person, place, and time.   Skin: Skin is dry. Rash (small round erythematous plaque.) noted.   Psychiatric: She has a normal mood and affect.           Diagnoses and all orders for this visit:    1. Acute pharyngitis, unspecified etiology (Primary)    2. Rash    Other orders  -     amoxicillin (AMOXIL) 500 MG capsule; Take 1 capsule by mouth 2 (Two) Times a Day for 10 days.  Dispense: 20 capsule; Refill: 0    Continue antifungal cream for a week. If no improvements, follow up.     Mode of visit: Video   Myself and Sisi Gale participated in this visit. The patient is located in [address] [place], [place] [id number]. I am located in [place], [place]. Mychart and [employer] were utilized.   You have chosen to receive care through a telehealth visit.    You have chosen to receive care through a telehealth visit.   Does the patient consent to use a video/audio connection for your medical care today? Yes       Note Disclaimer: At [facility], we believe that sharing information builds trust and better   relationships. You are receiving this note because you recently visited [facility]. It is possible you   will see health information before a provider has talked with you about it. This kind of information can   be easy to misunderstand. To help you fully understand what it means for your health, we urge you to   discuss this note with your provider.    Jeanna Barksdale, SEN  12/02/2024  15:40 EST

## 2024-12-02 NOTE — PATIENT INSTRUCTIONS
Drink plenty of water  Over the counter pain relievers okay   Gargle with warm salty water for sore throat pain   If symptoms do not improve in 3-5 days follow up with your primary care provider or urgent care     Clinical References    Pharyngitis  Pharyngitis is a sore throat (pharynx). This is when there is redness, pain, and swelling in your throat. Most of the time, this condition gets better on its own. In some cases, you may need medicine.  What are the causes?  An infection from a virus.  An infection from bacteria.  Allergies.  What increases the risk?  Being 5-24 years old.  Being in crowded environments. These include:  Daycares.  Schools.  Dormitories.  Living in a place with cold temperatures outside.  Having a weakened disease-fighting (immune) system.  What are the signs or symptoms?  Symptoms may vary depending on the cause. Common symptoms include:  Sore throat.  Tiredness (fatigue).  Low-grade fever.  Stuffy nose.  Cough.  Headache.  Other symptoms may include:  Glands in the neck (lymph nodes) that are swollen.  Skin rashes.  Film on the throat or tonsils. This can be caused by an infection from bacteria.  Vomiting.  Red, itchy eyes.  Loss of appetite.  Joint pain and muscle aches.  Tonsils that are temporarily bigger than usual (enlarged).  How is this treated?  Many times, treatment is not needed. This condition usually gets better in 3-4 days without treatment.  If the infection is caused by a bacteria, you may be need to take antibiotics.  Follow these instructions at home:  Medicines  Take over-the-counter and prescription medicines only as told by your doctor.  If you were prescribed an antibiotic medicine, take it as told by your doctor. Do not stop taking the antibiotic even if you start to feel better.  Use throat lozenges or sprays to soothe your throat as told by your doctor.  Children can get pharyngitis. Do not give your child aspirin.  Managing pain  To help with pain, try:  Sipping  warm liquids, such as:  Broth.  Herbal tea.  Warm water.  Eating or drinking cold or frozen liquids, such as frozen ice pops.  Rinsing your mouth (gargle) with a salt water mixture 3-4 times a day or as needed.  To make salt water, dissolve ½-1 tsp (3-6 g) of salt in 1 cup (237 mL) of warm water.  Do not swallow this mixture.  Sucking on hard candy or throat lozenges.  Putting a cool-mist humidifier in your bedroom at night to moisten the air.  Sitting in the bathroom with the door closed for 5-10 minutes while you run hot water in the shower.     General instructions  Do not smoke or use any products that contain nicotine or tobacco. If you need help quitting, ask your doctor.  Rest as told by your doctor.  Drink enough fluid to keep your pee (urine) pale yellow.  How is this prevented?  Wash your hands often for at least 20 seconds with soap and water. If soap and water are not available, use hand .  Do not touch your eyes, nose, or mouth with unwashed hands. Wash hands after touching these areas.  Do not share cups or eating utensils.  Avoid close contact with people who are sick.  Contact a doctor if:  You have large, tender lumps in your neck.  You have a rash.  You cough up green, yellow-brown, or bloody spit.  Get help right away if:  You have a stiff neck.  You drool or cannot swallow liquids.  You cannot drink or take medicines without vomiting.  You have very bad pain that does not go away with medicine.  You have problems breathing, and it is not from a stuffy nose.  You have new pain and swelling in your knees, ankles, wrists, or elbows.  These symptoms may be an emergency. Get help right away. Call your local emergency services (911 in the [place]  Do not wait to see if the symptoms will go away.  Do not drive yourself to the hospital.  Summary  Pharyngitis is a sore throat (pharynx). This is when there is redness, pain, and swelling in your throat.  Most of the time, pharyngitis gets better on  its own. Sometimes, you may need medicine.  If you were prescribed an antibiotic medicine, take it as told by your doctor. Do not stop taking the antibiotic even if you start to feel better.  This information is not intended to replace advice given to you by your health care provider. Make sure you discuss any questions you have with your health care provider.  Document Revised: 03/16/2022 Document Reviewed: 03/16/2022  [employer] Patient Education © 2024 [employer]     Body Ringworm  Body ringworm is an infection of the skin that often causes a ring-shaped rash. Body ringworm is also called tinea corporis.  Body ringworm can affect any part of your skin. This condition is easily spread from person to person (is very contagious).  What are the causes?  This condition is caused by fungi called dermatophytes. The condition develops when these fungi grow out of control on the skin.  You can get this condition if you touch a person or animal that has it. You can also get it if you share any items with an infected person or pet. These include:  Clothing, bedding, and towels.  Brushes or aguayo.  Gym equipment.  Any other object that has the fungus on it.  What increases the risk?  You are more likely to develop this condition if you:  Play sports that involve close physical contact, such as wrestling.  Sweat a lot.  Live in areas that are hot and humid.  Use public showers.  Have a weakened disease-fighting system (immune system).  What are the signs or symptoms?  Symptoms of this condition include:  Itchy, raised red spots and bumps.  Red scaly patches.  A ring-shaped rash. The rash may have:  A clear center.  Scales or red bumps at its center.  Redness near its borders.  Dry and scaly skin on or around it.  How is this diagnosed?  This condition can usually be diagnosed with a skin exam. A skin scraping may be taken from the affected area and examined under a microscope to see if the fungus is present.  How is this  treated?  This condition may be treated with:  An antifungal cream or ointment.  An antifungal shampoo.  Antifungal medicines. These may be prescribed if your ringworm:  Is severe.  Keeps coming back or lasts a long time.  Follow these instructions at home:  Take over-the-counter and prescription medicines only as told by your health care provider.  If you were given an antifungal cream or ointment:  Use it as told by your health care provider.  Wash the infected area and dry it completely before applying the cream or ointment.  If you were given an antifungal shampoo:  Use it as told by your health care provider.  Leave the shampoo on your body for 3-5 minutes before rinsing.  While you have a rash:  Wear loose clothing to stop clothes from rubbing and irritating it.  Wash or change your bed sheets every night.  Wash clothes and bed sheets in hot water.  Disinfect or throw out items that may be infected.  Wash your hands often with soap and water for at least 20 seconds. If soap and water are not available, use hand .  If your pet has the same infection, take your pet to see a  for treatment.  How is this prevented?  Take a bath or shower every day and after every time you work out or play sports.  Dry your skin completely after bathing.  Wear sandals or shoes in public places and showers.  Wash athletic clothes after each use.  Do not share personal items with others.  Avoid touching red patches of skin on other people.  Avoid touching pets that have bald spots.  If you touch an animal that has a bald spot, wash your hands.  Contact a health care provider if:  Your rash continues to spread after 7 days of treatment.  Your rash is not gone in 4 weeks.  The area around your rash gets red, warm, tender, and swollen.  This information is not intended to replace advice given to you by your health care provider. Make sure you discuss any questions you have with your health care provider.  Document  Revised: 06/01/2023 Document Reviewed: 06/01/2023  Elsevier Patient Education © 2024 [employer]

## 2024-12-12 RX ORDER — DROSPIRENONE AND ETHINYL ESTRADIOL 0.03MG-3MG
1 KIT ORAL DAILY
Qty: 28 TABLET | Refills: 11 | Status: SHIPPED | OUTPATIENT
Start: 2024-12-12

## 2024-12-16 RX ORDER — FLUCONAZOLE 200 MG/1
200 TABLET ORAL EVERY OTHER DAY
Qty: 6 TABLET | Refills: 1 | Status: SHIPPED | OUTPATIENT
Start: 2024-12-16

## 2024-12-16 NOTE — TELEPHONE ENCOUNTER
Rx Refill Note  Requested Prescriptions     Pending Prescriptions Disp Refills    fluconazole (DIFLUCAN) 200 MG tablet [Pharmacy Med Name: FLUCONAZOLE 200 MG TABLET] 6 tablet 1     Sig: TAKE 1 TABLET BY MOUTH EVERY OTHER DAY      Last office visit with prescribing clinician: 11/4/2024   Last telemedicine visit with prescribing clinician: Visit date not found   Next office visit with prescribing clinician: 12/30/2024                         Would you like a call back once the refill request has been completed: [] Yes [] No    If the office needs to give you a call back, can they leave a voicemail: [] Yes [] No    Brenda Del Rio RN  12/16/24, 09:17 CST

## 2024-12-17 ENCOUNTER — TELEMEDICINE (OUTPATIENT)
Dept: FAMILY MEDICINE CLINIC | Facility: TELEHEALTH | Age: 24
End: 2024-12-17
Payer: COMMERCIAL

## 2024-12-17 DIAGNOSIS — J02.0 STREP THROAT: Primary | ICD-10-CM

## 2024-12-17 PROCEDURE — 99213 OFFICE O/P EST LOW 20 MIN: CPT | Performed by: NURSE PRACTITIONER

## 2024-12-17 RX ORDER — PREDNISONE 10 MG/1
TABLET ORAL
Qty: 21 TABLET | Refills: 0 | Status: SHIPPED | OUTPATIENT
Start: 2024-12-17

## 2024-12-17 RX ORDER — AZITHROMYCIN 250 MG/1
TABLET, FILM COATED ORAL
Qty: 6 TABLET | Refills: 0 | Status: SHIPPED | OUTPATIENT
Start: 2024-12-17

## 2024-12-17 NOTE — PROGRESS NOTES
You have chosen to receive care through a telehealth visit.  Do you consent to use a video/audio connection for your medical care today? Yes     Patient or patient representative verbalized consent for the use of Ambient Listening during the visit with  SEN Hwang for chart documentation. 12/17/2024  12:03 EST    CHIEF COMPLAINT  No chief complaint on file.        HPI  Sisi Gale is a 24 y.o. female  presents with complaint of    History of Present Illness  The patient is a 24-year-old female who presents via virtual visit with complaints of a sore throat.    She reports persistent symptoms of a sore throat, characterized by morning drainage and the presence of white spots on the left side of her throat. She had a video visit 2 weeks ago. She suspects that she contracted strep from her niece a few weeks ago. Despite completing a course of amoxicillin 500 mg last week, there has been no significant improvement in her condition. She has not been prescribed prednisone for this issue. She has previously taken Z-Tommy without any adverse reactions.    ALLERGIES  The patient is allergic to CLARITHROMYCIN.    MEDICATIONS  Past: Amoxicillin       Review of Systems  See HPI    Past Medical History:   Diagnosis Date    Allergic     Anxiety        Family History   Problem Relation Age of Onset    No Known Problems Mother     Colon polyps Father     Diabetes Father     Colon cancer Neg Hx        Social History     Socioeconomic History    Marital status: Single   Tobacco Use    Smoking status: Never     Passive exposure: Never    Smokeless tobacco: Never   Vaping Use    Vaping status: Never Used   Substance and Sexual Activity    Alcohol use: No    Drug use: Never    Sexual activity: Not Currently     Partners: Male     Birth control/protection: Condom, Birth control pill       Sisi Gale  reports that she has never smoked. She has never been exposed to tobacco smoke. She has never used smokeless tobacco.              LMP 11/28/2024 (Approximate)     PHYSICAL EXAM  Physical Exam   Constitutional: She is oriented to person, place, and time. She appears well-developed and well-nourished. She does not have a sickly appearance. She does not appear ill.   HENT:   Head: Normocephalic and atraumatic.   Pulmonary/Chest: Effort normal.  No respiratory distress.  Lymphadenopathy:        Right cervical: Anterior cervical adenopathy present.        Left cervical: Anterior cervical adenopathy present.   Neurological: She is alert and oriented to person, place, and time.           Diagnoses and all orders for this visit:    1. Strep throat (Primary)  -     azithromycin (Zithromax Z-Tommy) 250 MG tablet; Take 2 tablets by mouth on day 1, then 1 tablet daily on days 2-5  Dispense: 6 tablet; Refill: 0  -     predniSONE (DELTASONE) 10 MG (21) dose pack; Use as directed on package  Dispense: 21 tablet; Refill: 0    --take medications as prescribed  --increase fluids, rest as needed, tylenol or ibuprofen for pain  --f/u in 5-7 days if no improvement      Assessment & Plan  1. Streptococcal pharyngitis.  She reports a persistent sore throat with white spots on the left side, despite completing a course of amoxicillin 500 mg. She has a known allergy to CLARITHROMYCIN. A prescription for a Z-Tommy (azithromycin) will be sent to [facility] in [facility]. Additionally, a course of steroids will be prescribed to alleviate the drainage and soreness. She is advised to take both medications as directed on the package.         FOLLOW-UP  As discussed during visit with PCP/[facility] if no improvement or Urgent Care/Emergency Department if worsening of symptoms    Patient verbalizes understanding of medication dosage, comfort measures, instructions for treatment and follow-up.    Lesley Velasquez, APRN  12/17/2024  12:03 EST    Mode of Visit: Video  Location of patient: -WORK-  Location of provider: +HOME+  You have chosen to receive care through a  telehealth visit.  The patient has signed the video visit consent form.  The visit included audio and video interaction. No technical issues occurred during this visit.      Note Disclaimer: At [facility], we believe that sharing information builds trust and better   relationships. You are receiving this note because you recently visited [facility]. It is possible you   will see health information before a provider has talked with you about it. This kind of information can   be easy to misunderstand. To help you fully understand what it means for your health, we urge you to   discuss this note with your provider.

## 2024-12-23 DIAGNOSIS — G43.E09 CHRONIC MIGRAINE WITH AURA WITHOUT STATUS MIGRAINOSUS, NOT INTRACTABLE: ICD-10-CM

## 2024-12-23 RX ORDER — BUTALBITAL, ACETAMINOPHEN, CAFFEINE AND CODEINE PHOSPHATE 50; 325; 40; 30 MG/1; MG/1; MG/1; MG/1
1 CAPSULE ORAL EVERY 4 HOURS PRN
Qty: 30 CAPSULE | Refills: 0 | OUTPATIENT
Start: 2024-12-23

## 2024-12-23 NOTE — TELEPHONE ENCOUNTER
Rx Refill Note  Requested Prescriptions     Pending Prescriptions Disp Refills    butalbital-acetaminophen-caffeine-codeine (FIORICET WITH CODEINE) -22-30 MG per capsule [Pharmacy Med Name: [id number]-[id number] ] 30 capsule 0     Sig: TAKE 1 CAPSULE BY MOUTH EVERY 4 (FOUR) HOURS AS NEEDED FOR MIGRAINE.      Last office visit with prescribing clinician: 11/4/2024   Last telemedicine visit with prescribing clinician: Visit date not found   Next office visit with prescribing clinician: 12/30/2024                         Would you like a call back once the refill request has been completed: [] Yes [] No    If the office needs to give you a call back, can they leave a voicemail: [] Yes [] No    Brenda Del Rio RN  12/23/24, 13:34 CST

## 2024-12-26 RX ORDER — PROPRANOLOL HCL 20 MG
20 TABLET ORAL 2 TIMES DAILY
Qty: 180 TABLET | Refills: 0 | Status: SHIPPED | OUTPATIENT
Start: 2024-12-26 | End: 2025-03-26

## 2024-12-30 ENCOUNTER — TELEMEDICINE (OUTPATIENT)
Dept: PSYCHIATRY | Facility: CLINIC | Age: 24
End: 2024-12-30
Payer: COMMERCIAL

## 2024-12-30 DIAGNOSIS — F41.1 GENERALIZED ANXIETY DISORDER: Primary | ICD-10-CM

## 2024-12-30 DIAGNOSIS — F42.2 MIXED OBSESSIONAL THOUGHTS AND ACTS: ICD-10-CM

## 2024-12-30 PROCEDURE — 96127 BRIEF EMOTIONAL/BEHAV ASSMT: CPT | Performed by: STUDENT IN AN ORGANIZED HEALTH CARE EDUCATION/TRAINING PROGRAM

## 2024-12-30 PROCEDURE — 99214 OFFICE O/P EST MOD 30 MIN: CPT | Performed by: STUDENT IN AN ORGANIZED HEALTH CARE EDUCATION/TRAINING PROGRAM

## 2024-12-30 RX ORDER — SERTRALINE HYDROCHLORIDE 100 MG/1
250 TABLET, FILM COATED ORAL DAILY
Qty: 75 TABLET | Refills: 1 | Status: SHIPPED | OUTPATIENT
Start: 2024-12-30 | End: 2025-02-28

## 2024-12-30 RX ORDER — SERTRALINE HYDROCHLORIDE 100 MG/1
200 TABLET, FILM COATED ORAL DAILY
Qty: 60 TABLET | Refills: 0 | OUTPATIENT
Start: 2024-12-30

## 2024-12-30 NOTE — PROGRESS NOTES
[facility] (through [facility]) is located [address] [place] [address]. This provider is located at home office, accessing appointment using a secure [employer] Video Visit through [employer]. Patient stated they are in a secure environment for this session. The patient's condition being diagnosed/treated is appropriate for telemedicine. The provider identified herself as well as her credentials.  The patient, and/or patients guardian, consent to be seen remotely, and when consent is given they understand that the consent allows for patient identifiable information to be sent to a third party as needed.   They may refuse to be seen remotely at any time. The electronic data is encrypted and password protected, and the patient and/or guardian has been advised of the potential risks to privacy not withstanding such measures.    Mode of Visit: Video  Location of patient: -HOME-  Location of provider: +HOME+  You have chosen to receive care through a telehealth visit.  The patient has signed the video visit consent form.  The visit included audio and video interaction. No technical issues occurred during this visit.    Patient identifiers utilized: Name and date of birth.    Patient verbally confirmed consent for today's encounter:  December 30, 2024  Subjective     Sisi Gale is a 24 y.o. female who presents today for follow up    Chief Complaint:    Chief Complaint   Patient presents with    Anxiety        History of Present Illness:    - Sisi Gale is a 24 y.o. patient presenting for follow up of depression. At the previous visit, Zoloft was increased to 200 mg qday  - Today, patient is doing 'okay'. She says that does still feel anxious and easily 'triggered'. Gives example of days that she doesn't take it that she gets much more angry. She does feel like this has improved since increasing her medications. Her boyfriend has noticed that she seems 'less  "snappy'.  She does feel around 3/4 PM is when she gets more anxious.   - Still has situations where she gets overwhelmed if work backs up and she starts to worry that people will be upset with her.       Current Medications:  Zoloft 200 mg qday  Vraylar 1.5 mg qday  Elavil 75 mg qhs (for headaches)    Side Effects: Denies  Sleep: No acute changes  Mood: \"Happy\"  SI/HI/AVH: Denies  Overall Function: Stable/improving      The following portions of the patient's history were reviewed and updated as appropriate: allergies, current medications, past family history, past medical history, past social history, past surgical history and problem list.        Past Medical History:  Past Medical History:   Diagnosis Date    Allergic     Anxiety      Past Psychiatric History:  Began Treatment:Started treatment in high school  Previous Diagnosis: Anxiety  Previous Psychiatrist: Denies  Therapist:Denies  Admission History:Denies  Medication Trials:   Zoloft, Lexapro, Buspar, Prozac, Wellbutrin (Did not help), Effexor (had been on this since rachelle/senior year), & Auvelity.   Self Harm:Denies  Suicide Attempts: Denies       Developmental History:  Pregnancy Complications: Born at term   Complications: Denies  Illness During Infancy: Denies  Milestones:  Grossly normal per patient     Substance Abuse History:   Types:Denies all, including illicit  Withdrawal Symptoms:Denies  Longest Period Sober:Not Applicable       Social History:  Social History     Socioeconomic History    Marital status: Single   Tobacco Use    Smoking status: Never     Passive exposure: Never    Smokeless tobacco: Never   Vaping Use    Vaping status: Never Used   Substance and Sexual Activity    Alcohol use: No    Drug use: Never    Sexual activity: Not Currently     Partners: Male     Birth control/protection: Condom, Birth control pill     Living Situation: Living in [place] with her boyfriend. G[place] up in [place]. 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   Foster Care Hx: Denies  Legal Issues: Denies  Special Education Hx: Denies  Abuse Hx: Denies       Family History:  Family History   Problem Relation Age of Onset    No Known Problems Mother     Colon polyps Father     Diabetes Father     Colon cancer Neg Hx        Past Surgical History:  Past Surgical History:   Procedure Laterality Date    COLONOSCOPY N/A 6/4/2024    Procedure: COLONOSCOPY WITH ANESTHESIA;  Surgeon: Arvind Dupont MD;  Location: [facility] ENDOSCOPY;  Service: Gastroenterology;  Laterality: N/A;  preop; constipation  postop normal   PCP Thalia Avilez    HEMORRHOIDECTOMY      WISDOM TOOTH EXTRACTION         Problem List:  Patient Active Problem List   Diagnosis    Left ureteral stone    Chronic tonsillitis    Antibiotic drug intolerance    Constipation    Cervical myofascial pain syndrome    Chronic migraine without aura, intractable, without status migrainosus       Allergy:   Allergies   Allergen Reactions    Bactrim [Sulfamethoxazole-Trimethoprim] Hives    Biaxin [Clarithromycin] Hives    Cephalosporins Hives    Sulfa Antibiotics Hives        Current Medications:   Current Outpatient Medications   Medication Sig Dispense Refill    amitriptyline (ELAVIL) 75 MG tablet Take 1 tablet by mouth every night at bedtime. 30 tablet 3    Azelastine HCl 137 MCG/SPRAY solution SPRAY 2 SPRAYS INTO THE NOSTRILS AS DIRECTED BY PROVIDER 2 TIMES DAILY      azithromycin (Zithromax Z-Tommy) 250 MG tablet Take 2 tablets by mouth on day 1, then 1 tablet daily on days 2-5 6 tablet 0    butalbital-acetaminophen-caffeine-codeine (FIORICET WITH CODEINE) -80-30 MG per capsule Take 1 capsule by mouth Every 4 (Four) Hours As Needed for Migraine. 30 capsule 0    Cariprazine HCl (Vraylar) 1.5 MG capsule capsule Take 1 capsule by mouth Daily. 30 capsule 1     eletriptan (RELPAX) 20 MG tablet TAKE 1 TABLET BY MOUTH DAILY AS NEEDED FOR MIGRAINE. MAY REPEAT IN 2 HOURS IF NECESSARY 6 tablet 6    fluconazole (DIFLUCAN) 200 MG tablet TAKE 1 TABLET BY MOUTH EVERY OTHER DAY 6 tablet 1    linaclotide (LINZESS) 145 MCG capsule capsule Take 1 capsule by mouth Daily. Take on empty stomach, at least 30 minutes before first meal of the day. 30 capsule 11    linaclotide (Linzess) 290 MCG capsule capsule Take 1 capsule by mouth Every Morning Before Breakfast. 30 capsule 6    ondansetron ODT (ZOFRAN-ODT) 4 MG disintegrating tablet Place 1 tablet on the tongue Every 6 (Six) Hours As Needed for Nausea or Vomiting. 30 tablet 0    predniSONE (DELTASONE) 10 MG (21) dose pack Use as directed on package 21 tablet 0    propranolol (INDERAL) 20 MG tablet TAKE 1 TABLET BY MOUTH 2 (TWO) TIMES A DAY FOR 90 DAYS. 180 tablet 0    sertraline (Zoloft) 100 MG tablet Take 2 tablets by mouth Daily. 60 tablet 0    Rosy 3-0.03 MG per tablet Take 1 tablet by mouth Daily. 28 tablet 11    tiZANidine (ZANAFLEX) 4 MG tablet Take 0.5-1 tablets by mouth Every 8 (Eight) Hours As Needed.       No current facility-administered medications for this visit.       Review of Symptoms:    Review of Systems    Physical Exam:   Physical Exam    Vitals:  Last menstrual period 11/28/2024, not currently breastfeeding.   There is no height or weight on file to calculate BMI.    Last 3 Blood Pressure Readings:  BP Readings from Last 3 Encounters:   11/04/24 103/72   09/30/24 119/87   08/30/24 128/89       PHQ-9 Score:   PHQ-9 Total Score: (Patient-Rptd) 0    CHIQUIS-7 Score:   Feeling nervous, anxious or on edge: (Patient-Rptd) Not at all  Not being able to stop or control worrying: (Patient-Rptd) Not at all  Worrying too much about different things: (Patient-Rptd) Not at all  Trouble Relaxing: (Patient-Rptd) Not at all  Being so restless that it is hard to sit still: (Patient-Rptd) Not at all  Feeling afraid as if something awful  might happen: (Patient-Rptd) Several days  Becoming easily annoyed or irritable: (Patient-Rptd) Several days  CHIQUIS 7 Total Score: (Patient-Rptd) 2  If you checked any problems, how difficult have these problems made it for you to do your work, take care of things at home, or get along with other people: (Patient-Rptd) Somewhat difficult     Mental Status Exam:   Hygiene:   good  Cooperation:  Cooperative  Eye Contact:  Good  Psychomotor Behavior:  Appropriate  Affect:  Full range  and Appropriate   Mood: euthymic  Hopelessness: Denies  Speech:  Normal  Thought Process:  Goal directed and Linear  Thought Content:  Normal  Suicidal:  None  Homicidal:  None  Hallucinations:  None  Delusion:  None  Memory:  Intact  Orientation:  Grossly intact  Reliability:  good  Insight:  Fair  Judgement:  Fair  Impulse Control:  Fair  Physical/Medical Issues:  Denies       Lab Results:   No visits with results within 3 Month(s) from this visit.   Latest known visit with results is:   Lab on 08/07/2024   Component Date Value Ref Range Status    Amphetamine, Urine Qual 08/07/2024 Negative  Fnrxrg=1901 ng/mL Final    Barbiturates Screen, Urine 08/07/2024 Positive (A)  Vjqdhm=520 Final    Comment: Amobarbital                 Negative            Tcczeh=946            01  Secobarbital                Negative            Guhicc=018            01  Butalbital                  Positive        A                         01  Butalbital Conf, MS, UR     646                ng/mL Hcuevr=624       01  Butalbital detected; this finding can be consistent with use of  medications that include Fiorinal, Fioricet, or generic formulations.  Drugs listed are representative of common sources of the compound and  are not intended to include all possible sources.  Pentobarbital               Negative            Ggtzod=048            01  Phenobarbital               Negative            Uxocnk=883            01      Benzodiazepine Screen, Urine 08/07/2024 Negative   Xuxuli=105 ng/mL Final    THC Screen, Urine 08/07/2024 Negative  Cutoff=20 ng/mL Final    Cocaine Screen, Urine 08/07/2024 Negative  Wyyovl=353 ng/mL Final    Opiate Screen, Urine 08/07/2024 Positive (A)  Mtpjly=869 ng/mL Final    Comment: Opiate test includes Codeine, Morphine, Hydromorphone, Hydrocodone.  Codeine                     Positive        A                         01  Codeine Conf, MS, UR        >3000              ng/mL Rlmque=396       01  Codeine detected; this finding is consistent with use of medications  that include Tylenol #2, #3, #4, Empirin #2, #3, #4, Robitussin A-C  Syrup, or numerous other trade or generic formulations containing  Codeine. Drugs listed are representative of common sources of the  compound detected and are not intended to include all possible  sources.  Morphine                    Positive        A                         01  Morphine Conf, MS, UR       1996               ng/mL Avvzqy=051       01  Morphine detected; this finding is consistent with use of medications  that include Duromorph, MS-Contin, Roxanol, Daisy, or drugs  containing Codeine, or generic formulations. This drug may also be  detected from use of Heroin. Drugs listed are representative of  common sources of the                            compound detected and are not intended to  include all possible sources.  Hydromorphone               Negative            Hslrvv=886            01  Hydrocodone                 Negative            Ikluid=546            01      Oxycodone/Oxymorphone, Urine 08/07/2024 Negative  Cfxczb=908 ng/mL Final    Test includes Oxycodone and Oxymorphone    Phencyclidine (PCP), Urine 08/07/2024 Negative  Cutoff=25 ng/mL Final    Methadone Screen, Urine 08/07/2024 Negative  Ezjvew=337 ng/mL Final    Propoxyphene Screen 08/07/2024 Negative  Ejhnhy=592 ng/mL Final    Meperidine, Urine 08/07/2024 Negative  Qnyghz=195 ng/mL Final    Comment: This test was developed and its performance  characteristics  determined by [facility]. It has not been cleared or approved  by the Food and Drug Administration.      Fentanyl, Urine 08/07/2024 Negative  Ptkeeh=9987 pg/mL Final    Comment: Test includes Fentanyl and Norfentanyl  This test was developed and its performance characteristics  determined by [facility]. It has not been cleared or approved  by the Food and Drug Administration.      Tramadol Screen, Urine 08/07/2024 Negative  Wxwrwi=312 ng/mL Final    Creatinine, Urine 08/07/2024 228.1  20.0 - 300.0 mg/dL Final    Specific Gravity, UA 08/07/2024 1.018   Final    pH, UA 08/07/2024 5.3  4.5 - 8.9 Final    Please note 08/07/2024 Comment   Final    Comment: Drug test results should be interpreted in the context of clinical  information. Patient metabolic variables, specific drug chemistry, and  specimen characteristics can affect test outcome. Technical  consultation is available if a test result is inconsistent with an  expected outcome.    Email:  [email or web address]    Phone:  [phone]  Drug brands, if listed herein, are trademarks of their respective  owners.           Assessment & Plan   Diagnoses and all orders for this visit:    1. Generalized anxiety disorder (Primary)    2. Mixed obsessional thoughts and acts        Visit Diagnoses:    ICD-10-CM ICD-9-CM   1. Generalized anxiety disorder  F41.1 300.02   2. Mixed obsessional thoughts and acts  F42.2 300.3       Formulation:  Sisi Gale is a 24 y.o. patient with previous dx of CHIQUIS and OCD presenting for follow up evaluation and management of anxiety. Patient says that since high school, she has dealt with significant anxiety, but it has worsened in the past year in the context of significant stress at work. Struggles with overthinking/worrying, which she feels makes her more irritable than she should be. Recently diagnosed with OCD, endoses past compulsive cleaning behaviors that have improved with Vraylar.     Today, patient is  doing okay. Has noticed some improvements in anxiety/irritability, though still feels there is room to further improve. Will further increase Zoloft     Plan:  #CHIQUIS #OCD  - Increase Zoloft to 250  mg qday  - Continue Vralyar at this time, will consider decrease once Zoloft is at a reliable dosage  - Discussed Serotonin Syndrome, and informed patient of red flag symptoms to monitor for, particularly since she is also taking Elavil for her headaches at this time.         Risk Assessment for Suicide/Harm To Self/Others: : Based on patient history, demographics and today's interview, Patient is considered to be at low risk for self harm/harm to others.      GOALS:  Short Term Goals: Patient will be compliant with medication, and patient will have no significant medication related side effects.  Patient will be engaged in psychotherapy as indicated.  Patient will report subjective improvement of symptoms.  Long term goals: To stabilize mood and treat/improve subjective symptoms, the patient will stay out of the hospital, the patient will be at an optimal level of functioning, and the patient will take all medications as prescribed.  The patient/guardian verbalized understanding and agreement with goals that were mutually set.      TREATMENT PLAN: Continue supportive psychotherapy efforts and medications as indicated.  Pharmacological and Non-Pharmacological treatment options discussed during today's visit. Patient/Guardian acknowledged and verbally consented with current treatment plan and was educated on the importance of compliance with treatment and follow-up appointments.      MEDICATION ISSUES:  Discussed medication options and treatment plan of prescribed medication as well as the risks, benefits, any black box warnings, and side effects including potential falls, possible impaired driving, and metabolic adversities among others. Patient is agreeable to call the office with any worsening of symptoms or onset of  side effects, or if any concerns or questions arise.  The contact information for the office is made available to the patient. Patient is agreeable to call 911 or go to the nearest ER should they begin having any SI/HI, or if any urgent concerns arise. No medication side effects or related complaints today.     MEDS ORDERED DURING VISIT:  No orders of the defined types were placed in this encounter.      MEDS DISCONTINUED DURING VISIT:   There are no discontinued medications.     Follow Up Appointment:   1 month           This document has been electronically signed by Carson Bhakta MD  December 30, 2024 10:14 EST

## 2025-01-03 ENCOUNTER — OFFICE VISIT (OUTPATIENT)
Dept: INTERNAL MEDICINE | Facility: CLINIC | Age: 25
End: 2025-01-03
Payer: COMMERCIAL

## 2025-01-03 VITALS
HEART RATE: 99 BPM | TEMPERATURE: 97.5 F | WEIGHT: 193 LBS | SYSTOLIC BLOOD PRESSURE: 125 MMHG | HEIGHT: 63 IN | DIASTOLIC BLOOD PRESSURE: 83 MMHG | RESPIRATION RATE: 20 BRPM | OXYGEN SATURATION: 99 % | BODY MASS INDEX: 34.2 KG/M2

## 2025-01-03 DIAGNOSIS — K59.09 CHRONIC CONSTIPATION: ICD-10-CM

## 2025-01-03 DIAGNOSIS — G43.E09 CHRONIC MIGRAINE WITH AURA WITHOUT STATUS MIGRAINOSUS, NOT INTRACTABLE: ICD-10-CM

## 2025-01-03 DIAGNOSIS — F41.9 SEVERE ANXIETY: Primary | ICD-10-CM

## 2025-01-03 PROCEDURE — 99214 OFFICE O/P EST MOD 30 MIN: CPT | Performed by: NURSE PRACTITIONER

## 2025-01-03 NOTE — PROGRESS NOTES
Subjective     Chief Complaint   Patient presents with    Anxiety       History of Present Illness  The patient is a 24-year-old female who presents for evaluation of weight gain, anxiety, gastrointestinal issues, and headaches.    She has been experiencing unexplained weight gain, despite maintaining her usual dietary habits. She is concerned about the potential impact of her medications on her weight. Additionally, she questions whether her birth control could be contributing to her weight gain, noting that her menstrual cycles have been regular, but the most recent one was unusually light.    She has been under the care of a psychiatrist, whom she has consulted twice. Initially, she was prescribed Zoloft 200 mg for irritability, which she reports as beneficial but notes that the effects seem to diminish by late afternoon. Consequently, her dosage was increased to 250 mg, with a potential increase up to 400 mg if necessary. She was advised to discontinue the medication if she experiences an elevated heart rate. She is currently on a single dose of 250 mg. Her psychiatrist has proposed discontinuing one of her other medications to assess her response without Vraylar. She has a follow-up appointment scheduled with her psychiatrist on 28th. She was previously on Zoloft 150 mg for a duration of 2 months.    She has been attempting a gluten-free diet due to gastrointestinal issues. She reports that certain days are manageable, while others are marked by severe abdominal pain necessitating enemas. She is currently on the highest dose of Linzess. She has not tried Amitiza. She suspects a mild gluten intolerance, as consumption of bread results in significant discomfort. She describes feeling extremely bloated after eating bread, likening it to being 9 months pregnant. She also reports experiencing severe discomfort after consuming toast.    She has been experiencing numbness on the right side of her body,  particularly in her right arm, which she describes as feeling like being pricked with needles. This symptom has been present for approximately 1.5 weeks. She reports that these episodes are often followed by migraines. She also experiences difficulty with speech during these episodes. She has been in communication with Dr. Mcbride regarding these symptoms and is awaiting a response.    MEDICATIONS  Current: Vraylar, Zoloft, Linzess    Otherwise complete ROS reviewed and negative except as mentioned in the HPI.    Past Medical History:   Past Medical History:   Diagnosis Date    Allergic     Anxiety      Past Surgical History:  Past Surgical History:   Procedure Laterality Date    COLONOSCOPY N/A 6/4/2024    Procedure: COLONOSCOPY WITH ANESTHESIA;  Surgeon: Arvind Dupont MD;  Location: [facility] ENDOSCOPY;  Service: Gastroenterology;  Laterality: N/A;  preop; constipation  postop normal   PCP Thalia Avilez    HEMORRHOIDECTOMY      WISDOM TOOTH EXTRACTION       Social History:  reports that she has never smoked. She has never been exposed to tobacco smoke. She has never used smokeless tobacco. She reports that she does not drink alcohol and does not use drugs.    Family History: family history includes Colon polyps in her father; Diabetes in her father; No Known Problems in her mother.       Allergies:  Allergies   Allergen Reactions    Bactrim [Sulfamethoxazole-Trimethoprim] Hives    Biaxin [Clarithromycin] Hives    Cephalosporins Hives    Sulfa Antibiotics Hives     Medications:  Prior to Admission medications    Medication Sig Start Date End Date Taking? Authorizing Provider   amitriptyline (ELAVIL) 75 MG tablet Take 1 tablet by mouth every night at bedtime. 7/26/24   Thalia Avilez APRN   Azelastine HCl 137 MCG/SPRAY solution SPRAY 2 SPRAYS INTO THE NOSTRILS AS DIRECTED BY PROVIDER 2 TIMES DAILY 11/13/24   Provider, MD Mary   azithromycin (Zithromax Z-Tommy) 250 MG tablet Take 2 tablets by mouth  "on day 1, then 1 tablet daily on days 2-5 12/17/24   Lesley Velasquez APRN   butalbital-acetaminophen-caffeine-codeine (FIORICET WITH CODEINE) -31-30 MG per capsule Take 1 capsule by mouth Every 4 (Four) Hours As Needed for Migraine. 10/29/24   Thalia Avilez APRN   Cariprazine HCl (Vraylar) 1.5 MG capsule capsule Take 1 capsule by mouth Daily. 8/30/24   Thalia Avilez APRN   eletriptan (RELPAX) 20 MG tablet TAKE 1 TABLET BY MOUTH DAILY AS NEEDED FOR MIGRAINE. MAY REPEAT IN 2 HOURS IF NECESSARY 10/16/24   Thalia Avilez APRN   fluconazole (DIFLUCAN) 200 MG tablet TAKE 1 TABLET BY MOUTH EVERY OTHER DAY 12/16/24   Thalia Avilez APRN   linaclotide (LINZESS) 145 MCG capsule capsule Take 1 capsule by mouth Daily. Take on empty stomach, at least 30 minutes before first meal of the day. 5/20/24   Sheryl Franklin APRN   linaclotide (Linzess) 290 MCG capsule capsule Take 1 capsule by mouth Every Morning Before Breakfast. 11/4/24   Thalia Avilez APRN   ondansetron ODT (ZOFRAN-ODT) 4 MG disintegrating tablet Place 1 tablet on the tongue Every 6 (Six) Hours As Needed for Nausea or Vomiting. 11/17/23   Dom Henderson APRN   predniSONE (DELTASONE) 10 MG (21) dose pack Use as directed on package 12/17/24   Lesley Velasquez APRN   propranolol (INDERAL) 20 MG tablet TAKE 1 TABLET BY MOUTH 2 (TWO) TIMES A DAY FOR 90 DAYS. 12/26/24 3/26/25  Thalia Avilez APRN   sertraline (Zoloft) 100 MG tablet Take 2.5 tablets by mouth Daily for 60 days. 12/30/24 2/28/25  Carson Bhakta MD Syeda 3-0.03 MG per tablet Take 1 tablet by mouth Daily. 12/12/24   Thalia Avilez APRN   tiZANidine (ZANAFLEX) 4 MG tablet Take 0.5-1 tablets by mouth Every 8 (Eight) Hours As Needed.    Provider, MD Mary       Objective     Vital Signs: /83   Pulse 99   Temp 97.5 °F (36.4 °C)   Resp 20   Ht 160 cm (63\")   Wt 87.5 kg (193 lb)   SpO2 99%   BMI 34.19 kg/m² "     Physical Exam    Physical Exam  Vitals reviewed.   Constitutional:       Appearance: She is well-developed. She is obese.   HENT:      Head: Normocephalic and atraumatic.   Eyes:      Pupils: Pupils are equal, round, and reactive to light.   Neck:      Vascular: No JVD.   Cardiovascular:      Rate and Rhythm: Normal rate and regular rhythm.   Pulmonary:      Effort: Pulmonary effort is normal.   Abdominal:      General: Bowel sounds are normal.      Palpations: Abdomen is soft.   Musculoskeletal:         General: No deformity.      Cervical back: Normal range of motion and neck supple.   Lymphadenopathy:      Cervical: No cervical adenopathy.   Skin:     General: Skin is warm and dry.   Neurological:      Mental Status: She is alert and oriented to person, place, and time.   Psychiatric:         Behavior: Behavior normal.         Thought Content: Thought content normal.         Judgment: Judgment normal.          Results Reviewed:  Glucose   Date Value Ref Range Status   04/03/2024 95 70 - 99 mg/dL Final   11/17/2023 94 65 - 99 mg/dL Final   05/06/2022 83 74 - 109 mg/dL Final     BUN   Date Value Ref Range Status   04/03/2024 8 6 - 20 mg/dL Final   11/17/2023 10 6 - 20 mg/dL Final   05/06/2022 9 6 - 20 mg/dL Final     Creatinine   Date Value Ref Range Status   04/03/2024 0.71 0.57 - 1.00 mg/dL Final   12/05/2023 0.70 0.60 - 1.30 mg/dL Final     Comment:     Serial Number: [id number]:  [id number]   05/06/2022 0.6 0.5 - 0.9 mg/dL Final     Sodium   Date Value Ref Range Status   04/03/2024 139 134 - 144 mmol/L Final   11/17/2023 138 136 - 145 mmol/L Final   05/06/2022 142 136 - 145 mmol/L Final     Potassium   Date Value Ref Range Status   04/03/2024 4.1 3.5 - 5.2 mmol/L Final   11/17/2023 3.8 3.5 - 5.2 mmol/L Final     Comment:     Slight hemolysis detected by analyzer. Result may be falsely elevated.   05/06/2022 4.2 3.5 - 5.0 mmol/L Final     Chloride   Date Value Ref Range Status   04/03/2024 100 96 - 106  mmol/L Final   11/17/2023 103 98 - 107 mmol/L Final   05/06/2022 103 98 - 111 mmol/L Final     CO2   Date Value Ref Range Status   11/17/2023 25.0 22.0 - 29.0 mmol/L Final   05/06/2022 21 (L) 22 - 29 mmol/L Final     Total CO2   Date Value Ref Range Status   04/03/2024 22 20 - 29 mmol/L Final     Calcium   Date Value Ref Range Status   04/03/2024 9.5 8.7 - 10.2 mg/dL Final   11/17/2023 9.0 8.6 - 10.5 mg/dL Final   05/06/2022 10.0 8.6 - 10.0 mg/dL Final     ALT (SGPT)   Date Value Ref Range Status   04/03/2024 17 0 - 32 IU/L Final   11/17/2023 15 1 - 33 U/L Final   05/06/2022 21 5 - 33 U/L Final     AST (SGOT)   Date Value Ref Range Status   04/03/2024 16 0 - 40 IU/L Final   11/17/2023 18 1 - 32 U/L Final   05/06/2022 20 5 - 32 U/L Final     WBC   Date Value Ref Range Status   04/03/2024 6.8 3.4 - 10.8 x10E3/uL Final   05/06/2022 10.5 4.8 - 10.8 K/uL Final     Hematocrit   Date Value Ref Range Status   04/03/2024 41.5 34.0 - 46.6 % Final   11/17/2023 38.4 34.0 - 46.6 % Final   05/06/2022 42.0 37.0 - 47.0 % Final     Platelets   Date Value Ref Range Status   04/03/2024 284 150 - 450 x10E3/uL Final   11/17/2023 297 140 - 450 10*3/mm3 Final   05/06/2022 353 130 - 400 K/uL Final     Triglycerides   Date Value Ref Range Status   12/08/2023 129 0 - 149 mg/dL Final     HDL Cholesterol   Date Value Ref Range Status   12/08/2023 80 >39 mg/dL Final     LDL Chol Calc (NIH)   Date Value Ref Range Status   12/08/2023 93 0 - 99 mg/dL Final     Hemoglobin A1C   Date Value Ref Range Status   12/08/2023 5.7 (H) 4.8 - 5.6 % Final     Comment:              Prediabetes: 5.7 - 6.4           Diabetes: >6.4           Glycemic control for adults with diabetes: <7.0     05/06/2022 5.4 4.0 - 6.0 % Final     Comment:     HbA1c levels >6% are an indication of hyperglycemia during the preceding 2  to 3 months or longer.    HbA1c levels may reach 20% or higher in poorly controlled diabetes.  Therapeutic action is suggested at levels above  8%.    Diabetes patients with HbA1c levels below 7% meet the goal of the American  Diabetes Association.    HbA1c levels below the established reference range may indicate recent  episodes of hypoglycemia, the presence of Hb variants, or shortened lifetime  of erythrocytes.        Results        Assessment / Plan     Assessment/Plan:  Diagnoses and all orders for this visit:    1. Severe anxiety (Primary)    2. Chronic migraine with aura without status migrainosus, not intractable    3. Chronic constipation        Assessment & Plan  1. Weight gain.  The weight gain could potentially be attributed to her use of Vraylar or Zoloft. She was informed that her birth control could also be a contributing factor.    2. Anxiety.  She is currently on Zoloft 250 mg, which has been helping but wears off by the end of the day. She was advised to monitor for any side effects such as increased heart rate and to stop the medication if such symptoms occur. The possibility of discontinuing Vraylar was discussed with her psychiatrist.    3. Gastrointestinal issues.  She is currently on the highest dose of Linzess but continues to experience severe stomach pain and irregular bowel movements. She was advised to try a low FODMAP diet, which could potentially alleviate her symptoms. If the dietary changes do not result in improvement, Amitiza may be considered as an alternative treatment option.    4. Headaches.  She reported experiencing numbness on the right side of her body, followed by migraines, which could be indicative of complex migraines. She was advised to follow up with Dr. Cross for further evaluation.    Follow-up  The patient will follow up in 3 months.    Return in about 3 months (around 4/3/2025) for Annual physical. unless patient needs to be seen sooner or acute issues arise.    Code Status: Full.     Patient or patient representative verbalized consent for the use of Ambient Listening during the visit with  Thalia  SEN Feng for chart documentation. 1/3/2025  07:52 CST    I have discussed the patient results/orders and and plan/recommendation with them at today's visit.      Signed by:    SEN Perez Date: 01/03/25

## 2025-01-14 DIAGNOSIS — F41.9 SEVERE ANXIETY: ICD-10-CM

## 2025-01-14 RX ORDER — CARIPRAZINE 1.5 MG/1
1.5 CAPSULE, GELATIN COATED ORAL DAILY
Qty: 90 CAPSULE | Refills: 1 | Status: SHIPPED | OUTPATIENT
Start: 2025-01-14

## 2025-01-14 NOTE — TELEPHONE ENCOUNTER
Rx Refill Note  Requested Prescriptions     Pending Prescriptions Disp Refills    Vraylar 1.5 MG capsule capsule [Pharmacy Med Name: VRAYLAR 1.5 MG CAPSULE] 30 capsule 1     Sig: TAKE 1 CAPSULE BY MOUTH EVERY DAY      Last office visit with prescribing clinician: 1/3/2025   Last telemedicine visit with prescribing clinician: Visit date not found   Next office visit with prescribing clinician: 4/4/2025                         Would you like a call back once the refill request has been completed: [] Yes [] No    If the office needs to give you a call back, can they leave a voicemail: [] Yes [] No    Brenda Del Rio RN  01/14/25, 12:34 CST

## 2025-01-15 ENCOUNTER — TELEMEDICINE (OUTPATIENT)
Dept: FAMILY MEDICINE CLINIC | Facility: TELEHEALTH | Age: 25
End: 2025-01-15
Payer: COMMERCIAL

## 2025-01-15 DIAGNOSIS — J02.9 EXUDATIVE PHARYNGITIS: Primary | ICD-10-CM

## 2025-01-15 DIAGNOSIS — J02.0 STREP THROAT: ICD-10-CM

## 2025-01-15 PROCEDURE — 99213 OFFICE O/P EST LOW 20 MIN: CPT | Performed by: NURSE PRACTITIONER

## 2025-01-15 RX ORDER — AMOXICILLIN 500 MG/1
500 CAPSULE ORAL 2 TIMES DAILY
Qty: 20 CAPSULE | Refills: 0 | Status: SHIPPED | OUTPATIENT
Start: 2025-01-15 | End: 2025-01-25

## 2025-01-15 NOTE — PROGRESS NOTES
Subjective   Chief Complaint   Patient presents with    Sore Throat       Sisi Gale is a 24 y.o. female.     History of Present Illness  Patient reports sore throat, swollen lymph nodes on the right side, red and white spots on the right side of throat that started about 2 days ago.  She had a fever yesterday.  Her boyfriend was recently diagnosed with strep.  Sore Throat   This is a new problem. Episode onset: 2 days. The problem has been unchanged. The fever has been present for Less than 1 day. Associated symptoms include neck pain and swollen glands. Pertinent negatives include no abdominal pain, congestion, coughing, diarrhea, ear pain, headaches, hoarse voice, shortness of breath, trouble swallowing or vomiting. She has had exposure to strep. She has tried nothing for the symptoms.        Allergies   Allergen Reactions    Bactrim [Sulfamethoxazole-Trimethoprim] Hives    Biaxin [Clarithromycin] Hives    Cephalosporins Hives    Sulfa Antibiotics Hives       Past Medical History:   Diagnosis Date    Allergic     Anxiety        Past Surgical History:   Procedure Laterality Date    COLONOSCOPY N/A 6/4/2024    Procedure: COLONOSCOPY WITH ANESTHESIA;  Surgeon: Arvind Dupont MD;  Location: [facility] ENDOSCOPY;  Service: Gastroenterology;  Laterality: N/A;  preop; constipation  postop normal   PCP Thalia Avilez    HEMORRHOIDECTOMY      WISDOM TOOTH EXTRACTION         Social History     Socioeconomic History    Marital status: Single   Tobacco Use    Smoking status: Never     Passive exposure: Never    Smokeless tobacco: Never   Vaping Use    Vaping status: Never Used   Substance and Sexual Activity    Alcohol use: No    Drug use: Never    Sexual activity: Not Currently     Partners: Male     Birth control/protection: Condom, Birth control pill       Family History   Problem Relation Age of Onset    No Known Problems Mother     Colon polyps Father     Diabetes Father     Colon cancer Neg Hx          Current  Outpatient Medications:     amitriptyline (ELAVIL) 75 MG tablet, Take 1 tablet by mouth every night at bedtime., Disp: 30 tablet, Rfl: 3    amoxicillin (AMOXIL) 500 MG capsule, Take 1 capsule by mouth 2 (Two) Times a Day for 10 days., Disp: 20 capsule, Rfl: 0    Azelastine HCl 137 MCG/SPRAY solution, SPRAY 2 SPRAYS INTO THE NOSTRILS AS DIRECTED BY PROVIDER 2 TIMES DAILY, Disp: , Rfl:     azithromycin (Zithromax Z-Tommy) 250 MG tablet, Take 2 tablets by mouth on day 1, then 1 tablet daily on days 2-5, Disp: 6 tablet, Rfl: 0    butalbital-acetaminophen-caffeine-codeine (FIORICET WITH CODEINE) -30-30 MG per capsule, Take 1 capsule by mouth Every 4 (Four) Hours As Needed for Migraine., Disp: 30 capsule, Rfl: 0    Cariprazine HCl (Vraylar) 1.5 MG capsule capsule, TAKE 1 CAPSULE BY MOUTH EVERY DAY, Disp: 90 capsule, Rfl: 1    eletriptan (RELPAX) 20 MG tablet, TAKE 1 TABLET BY MOUTH DAILY AS NEEDED FOR MIGRAINE. MAY REPEAT IN 2 HOURS IF NECESSARY, Disp: 6 tablet, Rfl: 6    fluconazole (DIFLUCAN) 200 MG tablet, TAKE 1 TABLET BY MOUTH EVERY OTHER DAY, Disp: 6 tablet, Rfl: 1    linaclotide (LINZESS) 145 MCG capsule capsule, Take 1 capsule by mouth Daily. Take on empty stomach, at least 30 minutes before first meal of the day., Disp: 30 capsule, Rfl: 11    linaclotide (Linzess) 290 MCG capsule capsule, Take 1 capsule by mouth Every Morning Before Breakfast., Disp: 30 capsule, Rfl: 6    ondansetron ODT (ZOFRAN-ODT) 4 MG disintegrating tablet, Place 1 tablet on the tongue Every 6 (Six) Hours As Needed for Nausea or Vomiting., Disp: 30 tablet, Rfl: 0    predniSONE (DELTASONE) 10 MG (21) dose pack, Use as directed on package, Disp: 21 tablet, Rfl: 0    propranolol (INDERAL) 20 MG tablet, TAKE 1 TABLET BY MOUTH 2 (TWO) TIMES A DAY FOR 90 DAYS., Disp: 180 tablet, Rfl: 0    sertraline (Zoloft) 100 MG tablet, Take 2.5 tablets by mouth Daily for 60 days., Disp: 75 tablet, Rfl: 1    Rosy 3-0.03 MG per tablet, Take 1 tablet by mouth  Daily., Disp: 28 tablet, Rfl: 11    tiZANidine (ZANAFLEX) 4 MG tablet, Take 0.5-1 tablets by mouth Every 8 (Eight) Hours As Needed., Disp: , Rfl:       Review of Systems   Constitutional:  Positive for fever.   HENT:  Positive for sore throat and swollen glands. Negative for congestion, ear pain, hoarse voice, rhinorrhea and trouble swallowing.    Respiratory:  Negative for cough, chest tightness, shortness of breath and wheezing.    Gastrointestinal:  Negative for abdominal pain, diarrhea and vomiting.   Musculoskeletal:  Positive for neck pain.   Neurological:  Negative for headache.        There were no vitals filed for this visit.    Objective   Physical Exam  Constitutional:       General: She is not in acute distress.     Appearance: Normal appearance. She is not ill-appearing, toxic-appearing or diaphoretic.   HENT:      Head: Normocephalic.      Nose: Nose normal.      Mouth/Throat:      Lips: Pink.      Mouth: Mucous membranes are moist.      Pharynx: Posterior oropharyngeal erythema present.      Tonsils: Tonsillar exudate present.      Comments: Per pt    Pulmonary:      Effort: Pulmonary effort is normal.   Lymphadenopathy:      Cervical: Cervical adenopathy (per pt) present.   Neurological:      Mental Status: She is alert and oriented to person, place, and time.          Procedures     Assessment & Plan   Diagnoses and all orders for this visit:    1. Exudative pharyngitis (Primary)  -     amoxicillin (AMOXIL) 500 MG capsule; Take 1 capsule by mouth 2 (Two) Times a Day for 10 days.  Dispense: 20 capsule; Refill: 0    2. Strep throat  -     amoxicillin (AMOXIL) 500 MG capsule; Take 1 capsule by mouth 2 (Two) Times a Day for 10 days.  Dispense: 20 capsule; Refill: 0    Change her toothbrush after couple days on antibiotics.  Alternate tylenol and motrin for pain and/or fever, stay hydrated and rest.     If symptoms worsen or do not improve follow up with your PCP or visit your nearest Urgent Care Center  or ER.        PLAN: Discussed dosing, side effects, recommended other symptomatic care.  Patient should follow up with primary care provider, Urgent Care or ER if symptoms worsen, fail to resolve or other symptoms need attention. Patient/family agree to the above.         SEN Downs     Mode of Visit: Video  Location of patient: -HOME-  Location of provider: +HOME+  You have chosen to receive care through a telehealth visit.  The patient has signed the video visit consent form.  The visit included audio and video interaction. No technical issues occurred during this visit.    The use of a video visit has been reviewed with the patient and verbal informed consent has been obtained. Myself and Sisi Gale participated in this visit. The patient is located at [address] [place] [address] [place] [address]. I am located in [place], [place]. Mychart and Zoom were utilized.        This visit was performed via Telehealth.  This patient has been instructed to follow-up with their primary care provider if their symptoms worsen or the treatment provided does not resolve their illness.

## 2025-01-15 NOTE — PATIENT INSTRUCTIONS
Change her toothbrush after couple days on antibiotics.  Alternate tylenol and motrin for pain and/or fever, stay hydrated and rest.     If symptoms worsen or do not improve follow up with your PCP or visit your nearest Urgent Care Center or ER.

## 2025-01-27 DIAGNOSIS — G43.E09 CHRONIC MIGRAINE WITH AURA WITHOUT STATUS MIGRAINOSUS, NOT INTRACTABLE: ICD-10-CM

## 2025-01-28 ENCOUNTER — TELEMEDICINE (OUTPATIENT)
Dept: PSYCHIATRY | Facility: CLINIC | Age: 25
End: 2025-01-28
Payer: COMMERCIAL

## 2025-01-28 DIAGNOSIS — F42.2 MIXED OBSESSIONAL THOUGHTS AND ACTS: ICD-10-CM

## 2025-01-28 DIAGNOSIS — F41.1 GENERALIZED ANXIETY DISORDER: Primary | ICD-10-CM

## 2025-01-28 RX ORDER — BUTALBITAL, ACETAMINOPHEN, CAFFEINE AND CODEINE PHOSPHATE 50; 325; 40; 30 MG/1; MG/1; MG/1; MG/1
1 CAPSULE ORAL EVERY 4 HOURS PRN
Qty: 30 CAPSULE | Refills: 0 | OUTPATIENT
Start: 2025-01-28

## 2025-01-28 NOTE — PROGRESS NOTES
[facility] (through [facility]) is located [address] [place] [address]. This provider is located at home office, accessing appointment using a secure Triptrottingt Video Visit through [employer]. Patient stated they are in a secure environment for this session. The patient's condition being diagnosed/treated is appropriate for telemedicine. The provider identified herself as well as her credentials.  The patient, and/or patients guardian, consent to be seen remotely, and when consent is given they understand that the consent allows for patient identifiable information to be sent to a third party as needed.   They may refuse to be seen remotely at any time. The electronic data is encrypted and password protected, and the patient and/or guardian has been advised of the potential risks to privacy not withstanding such measures.    Mode of Visit: Video  Location of patient: -HOME-  Location of provider: +HOME+  You have chosen to receive care through a telehealth visit.  The patient has signed the video visit consent form.  The visit included audio and video interaction. No technical issues occurred during this visit.    Patient identifiers utilized: Name and date of birth.    Patient verbally confirmed consent for today's encounter:  January 28, 2025  Subjective     Sisi Gale is a 24 y.o. female who presents today for follow up    Chief Complaint:    Chief Complaint   Patient presents with    Anxiety        History of Present Illness:    - Sisi Gale is a 24 y.o. patient presenting for follow up of depression. At the previous visit, Zoloft was increased to 250 mg qday  - She does feel like the increase has been helpful. Feels like she has not gotten as agitated over little. She did have one week where she accidentally took less for a week  - Has been staying busy at work. Has been busy in the pharmacy.   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     Current Medications:  Zoloft 250 mg qday  Vraylar 1.5 mg qday  Elavil 75 mg qhs (for headaches)      Side Effects: Denies any issues with fatigue, GI complaints, or fatigue  Sleep: No acute changes  Mood: \"Better, less irritable\"  SI/HI/AVH: Denies  Overall Function: Stable/improving      The following portions of the patient's history were reviewed and updated as appropriate: allergies, current medications, past family history, past medical history, past social history, past surgical history and problem list.        Past Medical History:  Past Medical History:   Diagnosis Date    Allergic     Anxiety      Past Psychiatric History:  Began Treatment:Started treatment in high school  Previous Diagnosis: Anxiety  Previous Psychiatrist: Denies  Therapist:Denies  Admission History:Denies  Medication Trials:   Zoloft, Lexapro, Buspar, Prozac, Wellbutrin (Did not help), Effexor (had been on this since rachelle/senior year), & Auvelity.   Self Harm:Denies  Suicide Attempts: Denies       Developmental History:  Pregnancy Complications: Born at term   Complications: Denies  Illness During Infancy: Denies  Milestones:  Grossly normal per patient     Substance Abuse History:   Types:Denies all, including illicit  Withdrawal Symptoms:Denies  Longest Period Sober:Not Applicable       Social History:  Social History     Socioeconomic History    Marital status: Single   Tobacco Use    Smoking status: Never     Passive exposure: Never    Smokeless tobacco: Never   Vaping Use    Vaping status: Never Used   Substance and Sexual Activity    Alcohol use: No    Drug use: Never    Sexual activity: Not Currently     Partners: Male     Birth control/protection: Condom, Birth control pill     Living Situation: Living in [place] with her boyfriend. Grew up in [place]. 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   Foster Care Hx: Denies  Legal Issues: Denies  Special Education Hx: Denies  Abuse Hx: Denies       Family History:  Family History   Problem Relation Age of Onset    No Known Problems Mother     Colon polyps Father     Diabetes Father     Colon cancer Neg Hx        Past Surgical History:  Past Surgical History:   Procedure Laterality Date    COLONOSCOPY N/A 6/4/2024    Procedure: COLONOSCOPY WITH ANESTHESIA;  Surgeon: Arvind Dupont MD;  Location: [facility] ENDOSCOPY;  Service: Gastroenterology;  Laterality: N/A;  preop; constipation  postop normal   PCP Thalia Avilez    HEMORRHOIDECTOMY      WISDOM TOOTH EXTRACTION         Problem List:  Patient Active Problem List   Diagnosis    Left ureteral stone    Chronic tonsillitis    Antibiotic drug intolerance    Constipation    Cervical myofascial pain syndrome    Chronic migraine without aura, intractable, without status migrainosus       Allergy:   Allergies   Allergen Reactions    Bactrim [Sulfamethoxazole-Trimethoprim] Hives    Biaxin [Clarithromycin] Hives    Cephalosporins Hives    Sulfa Antibiotics Hives        Current Medications:   Current Outpatient Medications   Medication Sig Dispense Refill    amitriptyline (ELAVIL) 75 MG tablet Take 1 tablet by mouth every night at bedtime. 30 tablet 3    Azelastine HCl 137 MCG/SPRAY solution SPRAY 2 SPRAYS INTO THE NOSTRILS AS DIRECTED BY PROVIDER 2 TIMES DAILY      azithromycin (Zithromax Z-Tommy) 250 MG tablet Take 2 tablets by mouth on day 1, then 1 tablet daily on days 2-5 6 tablet 0    butalbital-acetaminophen-caffeine-codeine (FIORICET WITH CODEINE) -63-30 MG per capsule Take 1 capsule by mouth Every 4 (Four) Hours As Needed for Migraine. 30 capsule 0    Cariprazine HCl (Vraylar) 1.5 MG capsule capsule TAKE 1 CAPSULE BY MOUTH EVERY DAY 90 capsule 1    eletriptan (RELPAX) 20 MG tablet TAKE 1 TABLET BY MOUTH DAILY AS  NEEDED FOR MIGRAINE. MAY REPEAT IN 2 HOURS IF NECESSARY 6 tablet 6    fluconazole (DIFLUCAN) 200 MG tablet TAKE 1 TABLET BY MOUTH EVERY OTHER DAY 6 tablet 1    linaclotide (LINZESS) 145 MCG capsule capsule Take 1 capsule by mouth Daily. Take on empty stomach, at least 30 minutes before first meal of the day. 30 capsule 11    linaclotide (Linzess) 290 MCG capsule capsule Take 1 capsule by mouth Every Morning Before Breakfast. 30 capsule 6    ondansetron ODT (ZOFRAN-ODT) 4 MG disintegrating tablet Place 1 tablet on the tongue Every 6 (Six) Hours As Needed for Nausea or Vomiting. 30 tablet 0    predniSONE (DELTASONE) 10 MG (21) dose pack Use as directed on package 21 tablet 0    propranolol (INDERAL) 20 MG tablet TAKE 1 TABLET BY MOUTH 2 (TWO) TIMES A DAY FOR 90 DAYS. 180 tablet 0    sertraline (Zoloft) 100 MG tablet Take 2.5 tablets by mouth Daily for 60 days. 75 tablet 1    Rosy 3-0.03 MG per tablet Take 1 tablet by mouth Daily. 28 tablet 11    tiZANidine (ZANAFLEX) 4 MG tablet Take 0.5-1 tablets by mouth Every 8 (Eight) Hours As Needed.       No current facility-administered medications for this visit.       Review of Symptoms:    Review of Systems    Physical Exam:   Physical Exam    Vitals:  not currently breastfeeding.   There is no height or weight on file to calculate BMI.    Last 3 Blood Pressure Readings:  BP Readings from Last 3 Encounters:   01/03/25 125/83   11/04/24 103/72   09/30/24 119/87       PHQ-9 Score:   PHQ-9 Total Score: (Patient-Rptd) 8    CHIQUIS-7 Score:   Feeling nervous, anxious or on edge: (Patient-Rptd) Several days  Not being able to stop or control worrying: (Patient-Rptd) More than half the days  Worrying too much about different things: (Patient-Rptd) More than half the days  Trouble Relaxing: (Patient-Rptd) More than half the days  Being so restless that it is hard to sit still: (Patient-Rptd) Several days  Feeling afraid as if something awful might happen: (Patient-Rptd) Several  "days  Becoming easily annoyed or irritable: (Patient-Rptd) Several days  CHIQUIS 7 Total Score: (Patient-Rptd) 10  If you checked any problems, how difficult have these problems made it for you to do your work, take care of things at home, or get along with other people: (Patient-Rptd) Somewhat difficult     Mental Status Exam:   Hygiene:   good  Cooperation:  Cooperative  Eye Contact:  Good  Psychomotor Behavior:  Appropriate  Affect:  Full range  and Appropriate   Mood: euthymic \"Less irritable\"  Hopelessness: Denies  Speech:  Normal  Thought Process:  Goal directed and Linear  Thought Content:  Normal  Suicidal:  None  Homicidal:  None  Hallucinations:  None  Delusion:  None  Memory:  Intact  Orientation:  Grossly intact  Reliability:  good  Insight:  Fair  Judgement:  Fair  Impulse Control:  Fair  Physical/Medical Issues:  Denies       Lab Results:   No visits with results within 3 Month(s) from this visit.   Latest known visit with results is:   Lab on 08/07/2024   Component Date Value Ref Range Status    Amphetamine, Urine Qual 08/07/2024 Negative  Phrwyz=1932 ng/mL Final    Barbiturates Screen, Urine 08/07/2024 Positive (A)  Qjfhpl=314 Final    Comment: Amobarbital                 Negative            Lxpnwj=063            01  Secobarbital                Negative            Unbnjw=483            01  Butalbital                  Positive        A                         01  Butalbital Conf, MS, UR     646                ng/mL Dlbdeq=129       01  Butalbital detected; this finding can be consistent with use of  medications that include Fiorinal, Fioricet, or generic formulations.  Drugs listed are representative of common sources of the compound and  are not intended to include all possible sources.  Pentobarbital               Negative            Rwdkof=283            01  Phenobarbital               Negative            Xgkrwl=958            01      Benzodiazepine Screen, Urine 08/07/2024 Negative  Cpikkz=361 ng/mL " Final    THC Screen, Urine 08/07/2024 Negative  Cutoff=20 ng/mL Final    Cocaine Screen, Urine 08/07/2024 Negative  Plrypk=111 ng/mL Final    Opiate Screen, Urine 08/07/2024 Positive (A)  Vdbmfc=981 ng/mL Final    Comment: Opiate test includes Codeine, Morphine, Hydromorphone, Hydrocodone.  Codeine                     Positive        A                         01  Codeine Conf, MS, UR        >3000              ng/mL Debsmc=661       01  Codeine detected; this finding is consistent with use of medications  that include Tylenol #2, #3, #4, Empirin #2, #3, #4, Robitussin A-C  Syrup, or numerous other trade or generic formulations containing  Codeine. Drugs listed are representative of common sources of the  compound detected and are not intended to include all possible  sources.  Morphine                    Positive        A                         01  Morphine Conf, MS, UR       1996               ng/mL Ifvrus=872       01  Morphine detected; this finding is consistent with use of medications  that include Duromorph, MS-Contin, Roxanol, Daisy, or drugs  containing Codeine, or generic formulations. This drug may also be  detected from use of Heroin. Drugs listed are representative of  common sources of the                            compound detected and are not intended to  include all possible sources.  Hydromorphone               Negative            Tersxf=541            01  Hydrocodone                 Negative            Qnwvwk=796            01      Oxycodone/Oxymorphone, Urine 08/07/2024 Negative  Vhcdub=681 ng/mL Final    Test includes Oxycodone and Oxymorphone    Phencyclidine (PCP), Urine 08/07/2024 Negative  Cutoff=25 ng/mL Final    Methadone Screen, Urine 08/07/2024 Negative  Xtcgzl=810 ng/mL Final    Propoxyphene Screen 08/07/2024 Negative  Isrtsl=130 ng/mL Final    Meperidine, Urine 08/07/2024 Negative  Cegphp=771 ng/mL Final    Comment: This test was developed and its performance  characteristics  determined by [employer]. It has not been cleared or approved  by the Food and Drug Administration.      Fentanyl, Urine 08/07/2024 Negative  Ujsnqu=8801 pg/mL Final    Comment: Test includes Fentanyl and Norfentanyl  This test was developed and its performance characteristics  determined by [employer]. It has not been cleared or approved  by the Food and Drug Administration.      Tramadol Screen, Urine 08/07/2024 Negative  Yiqaek=621 ng/mL Final    Creatinine, Urine 08/07/2024 228.1  20.0 - 300.0 mg/dL Final    Specific Gravity, UA 08/07/2024 1.018   Final    pH, UA 08/07/2024 5.3  4.5 - 8.9 Final    Please note 08/07/2024 Comment   Final    Comment: Drug test results should be interpreted in the context of clinical  information. Patient metabolic variables, specific drug chemistry, and  specimen characteristics can affect test outcome. Technical  consultation is available if a test result is inconsistent with an  expected outcome.    Email:  [email or web address]    Phone:  [phone]  Drug brands, if listed herein, are trademarks of their respective  owners.           Assessment & Plan   Diagnoses and all orders for this visit:    1. Generalized anxiety disorder (Primary)    2. Mixed obsessional thoughts and acts          Visit Diagnoses:    ICD-10-CM ICD-9-CM   1. Generalized anxiety disorder  F41.1 300.02   2. Mixed obsessional thoughts and acts  F42.2 300.3         Formulation:  Sisi Gale is a 24 y.o. patient with previous dx of CHIQUIS and OCD presenting for follow up evaluation and management of anxiety. Patient says that since high school, she has dealt with significant anxiety, but it has worsened in the past year in the context of significant stress at work. Struggles with overthinking/worrying, which she feels makes her more irritable than she should be. Recently diagnosed with OCD, endoses past compulsive cleaning behaviors that have improved with Vraylar.     1/28/2025: Patient  is doing well, tolerating new dosage well. Plan to continue current regimen and follow up in 6 weeks     Plan:  #CHIQUIS #OCD  - Continue Zoloft to 250  mg qday  - Continue Vralyar at this time, will consider decrease once Zoloft is at a reliable dosage  - Discussed Serotonin Syndrome, and informed patient of red flag symptoms to monitor for, particularly since she is also taking Elavil for her headaches at this time.         Risk Assessment for Suicide/Harm To Self/Others: : Based on patient history, demographics and today's interview, Patient is considered to be at low risk for self harm/harm to others.      GOALS:  Short Term Goals: Patient will be compliant with medication, and patient will have no significant medication related side effects.  Patient will be engaged in psychotherapy as indicated.  Patient will report subjective improvement of symptoms.  Long term goals: To stabilize mood and treat/improve subjective symptoms, the patient will stay out of the hospital, the patient will be at an optimal level of functioning, and the patient will take all medications as prescribed.  The patient/guardian verbalized understanding and agreement with goals that were mutually set.      TREATMENT PLAN: Continue supportive psychotherapy efforts and medications as indicated.  Pharmacological and Non-Pharmacological treatment options discussed during today's visit. Patient/Guardian acknowledged and verbally consented with current treatment plan and was educated on the importance of compliance with treatment and follow-up appointments.      MEDICATION ISSUES:  Discussed medication options and treatment plan of prescribed medication as well as the risks, benefits, any black box warnings, and side effects including potential falls, possible impaired driving, and metabolic adversities among others. Patient is agreeable to call the office with any worsening of symptoms or onset of side effects, or if any concerns or questions  arise.  The contact information for the office is made available to the patient. Patient is agreeable to call 911 or go to the nearest ER should they begin having any SI/HI, or if any urgent concerns arise. No medication side effects or related complaints today.     MEDS ORDERED DURING VISIT:  No orders of the defined types were placed in this encounter.      MEDS DISCONTINUED DURING VISIT:   There are no discontinued medications.     Follow Up Appointment:   6 weeks           This document has been electronically signed by Carson Bhakta MD  January 28, 2025 10:14 EST

## 2025-02-06 DIAGNOSIS — J01.40 ACUTE PANSINUSITIS, UNSPECIFIED: ICD-10-CM

## 2025-02-06 RX ORDER — AZELASTINE HYDROCHLORIDE 137 UG/1
SPRAY, METERED NASAL
Qty: 30 ML | Refills: 6 | Status: SHIPPED | OUTPATIENT
Start: 2025-02-06

## 2025-02-06 NOTE — TELEPHONE ENCOUNTER
Rx Refill Note  Requested Prescriptions     Pending Prescriptions Disp Refills    Azelastine HCl 137 MCG/SPRAY solution [Pharmacy Med Name: AZELASTINE 0.1% (137 MCG) SPRY]  1     Sig: SPRAY 2 SPRAYS INTO THE NOSTRILS AS DIRECTED BY PROVIDER 2 TIMES DAILY      Last office visit with prescribing clinician: 1/8/2024   Last telemedicine visit with prescribing clinician: Visit date not found   Next office visit with prescribing clinician: Visit date not found                         Would you like a call back once the refill request has been completed: [] Yes [] No    If the office needs to give you a call back, can they leave a voicemail: [] Yes [] No    Brenda Del Rio RN  02/06/25, 07:07 CST

## 2025-02-10 ENCOUNTER — LAB (OUTPATIENT)
Dept: LAB | Facility: HOSPITAL | Age: 25
End: 2025-02-10
Payer: COMMERCIAL

## 2025-02-10 ENCOUNTER — OFFICE VISIT (OUTPATIENT)
Dept: GASTROENTEROLOGY | Facility: CLINIC | Age: 25
End: 2025-02-10
Payer: COMMERCIAL

## 2025-02-10 VITALS
HEART RATE: 92 BPM | BODY MASS INDEX: 32.61 KG/M2 | TEMPERATURE: 96.8 F | HEIGHT: 64 IN | OXYGEN SATURATION: 100 % | DIASTOLIC BLOOD PRESSURE: 76 MMHG | SYSTOLIC BLOOD PRESSURE: 106 MMHG | WEIGHT: 191 LBS

## 2025-02-10 DIAGNOSIS — K59.00 CONSTIPATION, UNSPECIFIED CONSTIPATION TYPE: Primary | ICD-10-CM

## 2025-02-10 DIAGNOSIS — K59.00 CONSTIPATION, UNSPECIFIED CONSTIPATION TYPE: ICD-10-CM

## 2025-02-10 LAB — TSH SERPL DL<=0.05 MIU/L-ACNC: 1.83 UIU/ML (ref 0.27–4.2)

## 2025-02-10 PROCEDURE — 84443 ASSAY THYROID STIM HORMONE: CPT

## 2025-02-10 PROCEDURE — 99214 OFFICE O/P EST MOD 30 MIN: CPT | Performed by: NURSE PRACTITIONER

## 2025-02-10 PROCEDURE — 36415 COLL VENOUS BLD VENIPUNCTURE: CPT

## 2025-02-10 RX ORDER — FLUCONAZOLE 200 MG/1
200 TABLET ORAL EVERY OTHER DAY
Qty: 6 TABLET | Refills: 1 | Status: SHIPPED | OUTPATIENT
Start: 2025-02-10

## 2025-02-10 NOTE — TELEPHONE ENCOUNTER
Rx Refill Note  Requested Prescriptions     Pending Prescriptions Disp Refills    fluconazole (DIFLUCAN) 200 MG tablet 6 tablet 1     Sig: Take 1 tablet by mouth Every Other Day.      Last office visit with prescribing clinician: 1/3/2025   Last telemedicine visit with prescribing clinician: Visit date not found   Next office visit with prescribing clinician: 4/4/2025                         Would you like a call back once the refill request has been completed: [] Yes [] No    If the office needs to give you a call back, can they leave a voicemail: [] Yes [] No    Brenda Del Rio RN  02/10/25, 07:06 CST

## 2025-02-10 NOTE — PROGRESS NOTES
[facility] GASTROENTEROLOGY - OFFICE NOTE    2/10/2025    Sisi Gale   2000    Primary Physician: Thalia Avilez APRN    Referring Physician: Jeovany Quevedo MD    Chief Complaint   Patient presents with    Constipation         HISTORY OF PRESENT ILLNESS:     Sisi Gale is a 24 y.o. female presents with constipation for 1 year. She has had rlq pain intermittent for 2 mo. Having a good bm will help. Has bm daily. Taking linzess, otc laxatives/miralax, and stool softeners and fiber supplement. Has gained weight. She does takes medications that does have adverse effect of constipation.  No rectal bleeding.       Drinks 2-3 bottles water daily. Takes fiber supplement daily.   Started vraylar around 6 mo ago.   Exercises at least 4-5 days per week.     Had ultrasound pelvic and was told has a lot of stool.   CT Abdomen Pelvis Stone Protocol (07/26/2024 13:04)   Colonoscopy (06/04/2024 12:01)     Past Medical History:   Diagnosis Date    Allergic     Anxiety        Past Surgical History:   Procedure Laterality Date    COLONOSCOPY N/A 6/4/2024    Procedure: COLONOSCOPY WITH ANESTHESIA;  Surgeon: Arvind Dupont MD;  Location: [facility] ENDOSCOPY;  Service: Gastroenterology;  Laterality: N/A;  preop; constipation  postop normal   PCP Thalia Avilez    HEMORRHOIDECTOMY      WISDOM TOOTH EXTRACTION         Outpatient Medications Marked as Taking for the 2/10/25 encounter (Office Visit) with Sheryl Franklin APRN   Medication Sig Dispense Refill    amitriptyline (ELAVIL) 75 MG tablet Take 1 tablet by mouth every night at bedtime. 30 tablet 3    Azelastine HCl 137 MCG/SPRAY solution SPRAY 2 SPRAYS INTO THE NOSTRILS AS DIRECTED BY PROVIDER 2 TIMES DAILY (Patient taking differently: As Needed.) 30 mL 6    butalbital-acetaminophen-caffeine-codeine (FIORICET WITH CODEINE) -08-30 MG per capsule Take 1 capsule by mouth Every 4 (Four) Hours As Needed for Migraine. 30 capsule 0    Cariprazine HCl  (Vraylar) 1.5 MG capsule capsule TAKE 1 CAPSULE BY MOUTH EVERY DAY 90 capsule 1    eletriptan (RELPAX) 20 MG tablet TAKE 1 TABLET BY MOUTH DAILY AS NEEDED FOR MIGRAINE. MAY REPEAT IN 2 HOURS IF NECESSARY 6 tablet 6    fluconazole (DIFLUCAN) 200 MG tablet Take 1 tablet by mouth Every Other Day. (Patient taking differently: Take 1 tablet by mouth As Needed.) 6 tablet 1    linaclotide (Linzess) 290 MCG capsule capsule Take 1 capsule by mouth Every Morning Before Breakfast. 30 capsule 6    ondansetron ODT (ZOFRAN-ODT) 4 MG disintegrating tablet Place 1 tablet on the tongue Every 6 (Six) Hours As Needed for Nausea or Vomiting. 30 tablet 0    propranolol (INDERAL) 20 MG tablet TAKE 1 TABLET BY MOUTH 2 (TWO) TIMES A DAY FOR 90 DAYS. 180 tablet 0    sertraline (Zoloft) 100 MG tablet Take 2.5 tablets by mouth Daily for 60 days. 75 tablet 1    Rosy 3-0.03 MG per tablet Take 1 tablet by mouth Daily. 28 tablet 11    tiZANidine (ZANAFLEX) 4 MG tablet Take 0.5-1 tablets by mouth Every 8 (Eight) Hours As Needed.         Allergies   Allergen Reactions    Bactrim [Sulfamethoxazole-Trimethoprim] Hives    Biaxin [Clarithromycin] Hives    Cephalosporins Hives    Prednisone Hives    Sulfa Antibiotics Hives       Social History     Socioeconomic History    Marital status: Single   Tobacco Use    Smoking status: Never     Passive exposure: Never    Smokeless tobacco: Never   Vaping Use    Vaping status: Never Used   Substance and Sexual Activity    Alcohol use: No    Drug use: Never    Sexual activity: Not Currently     Partners: Male     Birth control/protection: Condom, Birth control pill       Family History   Problem Relation Age of Onset    No Known Problems Mother     Colon polyps Father     Diabetes Father     Colon cancer Neg Hx        Review of Systems   Constitutional:  Negative for chills, fever and unexpected weight change.   Respiratory:  Negative for shortness of breath.    Cardiovascular:  Negative for chest pain.  "  Gastrointestinal:  Negative for abdominal distention, abdominal pain, anal bleeding, blood in stool, diarrhea, nausea and vomiting.        Vitals:    02/10/25 0816   BP: 106/76   Pulse: 92   Temp: 96.8 °F (36 °C)   SpO2: 100%   Weight: 86.6 kg (191 lb)   Height: 162.6 cm (64\")      Body mass index is 32.79 kg/m².    Physical Exam  Vitals reviewed.   Constitutional:       General: She is not in acute distress.  Cardiovascular:      Rate and Rhythm: Normal rate and regular rhythm.      Heart sounds: Normal heart sounds.   Pulmonary:      Effort: Pulmonary effort is normal.      Breath sounds: Normal breath sounds.   Abdominal:      General: Bowel sounds are normal. There is no distension.      Palpations: Abdomen is soft.      Tenderness: There is no abdominal tenderness.   Skin:     General: Skin is warm and dry.   Neurological:      Mental Status: She is alert.         Results for orders placed or performed in visit on 08/07/24   Pain Management Profile (13 Drugs) Urine - Urine, Clean Catch    Collection Time: 08/07/24  7:02 AM    Specimen: Urine, Clean Catch   Result Value Ref Range    Amphetamine, Urine Qual Negative Pfxdfz=9538 ng/mL    Barbiturates Screen, Urine Positive (A) Pljahk=422    Benzodiazepine Screen, Urine Negative Dcsppb=503 ng/mL    THC Screen, Urine Negative Cutoff=20 ng/mL    Cocaine Screen, Urine Negative Biwnds=142 ng/mL    Opiate Screen, Urine Positive (A) Gwzfry=837 ng/mL    Oxycodone/Oxymorphone, Urine Negative Hlfgch=995 ng/mL    Phencyclidine (PCP), Urine Negative Cutoff=25 ng/mL    Methadone Screen, Urine Negative Ypwftx=489 ng/mL    Propoxyphene Screen Negative Jxmkif=216 ng/mL    Meperidine, Urine Negative Ukotxs=512 ng/mL    Fentanyl, Urine Negative Oqjhae=0620 pg/mL    Tramadol Screen, Urine Negative Wtnnij=749 ng/mL    Creatinine, Urine 228.1 20.0 - 300.0 mg/dL    Specific Gravity, UA 1.018     pH, UA 5.3 4.5 - 8.9    Please note Comment            ASSESSMENT AND PLAN    Assessment " & Plan     Diagnoses and all orders for this visit:    1. Constipation, unspecified constipation type (Primary)  -     TSH; Future      In regards to constipation, recommend increase water intake/daily fiber supplement/exercise.  We also discussed increasing miralax. She can take miralax along with the linzess. I will also check TSH. She is up to date on colonoscopy. Office visit 6 weeks.           Return in about 6 weeks (around 3/24/2025).          There are no Patient Instructions on file for this visit.      Sheryl Franklin, APRN

## 2025-02-12 ENCOUNTER — TELEPHONE (OUTPATIENT)
Dept: GASTROENTEROLOGY | Facility: CLINIC | Age: 25
End: 2025-02-12
Payer: COMMERCIAL

## 2025-02-24 RX ORDER — SERTRALINE HYDROCHLORIDE 100 MG/1
TABLET, FILM COATED ORAL
Qty: 75 TABLET | Refills: 1 | Status: SHIPPED | OUTPATIENT
Start: 2025-02-24

## 2025-03-07 RX ORDER — FLUCONAZOLE 200 MG/1
200 TABLET ORAL EVERY OTHER DAY
Qty: 6 TABLET | Refills: 1 | Status: SHIPPED | OUTPATIENT
Start: 2025-03-07

## 2025-03-07 NOTE — TELEPHONE ENCOUNTER
Rx Refill Note  Requested Prescriptions     Pending Prescriptions Disp Refills    fluconazole (DIFLUCAN) 200 MG tablet [Pharmacy Med Name: FLUCONAZOLE 200 MG TABLET] 6 tablet 1     Sig: TAKE 1 TABLET BY MOUTH EVERY OTHER DAY      Last office visit with prescribing clinician: 1/3/2025   Last telemedicine visit with prescribing clinician: Visit date not found   Next office visit with prescribing clinician: 4/4/2025                         Would you like a call back once the refill request has been completed: [] Yes [] No    If the office needs to give you a call back, can they leave a voicemail: [] Yes [] No    Brenda Del Rio RN  03/07/25, 10:09 CST

## 2025-03-10 ENCOUNTER — TELEMEDICINE (OUTPATIENT)
Dept: PSYCHIATRY | Facility: CLINIC | Age: 25
End: 2025-03-10
Payer: COMMERCIAL

## 2025-03-10 DIAGNOSIS — F42.2 MIXED OBSESSIONAL THOUGHTS AND ACTS: ICD-10-CM

## 2025-03-10 DIAGNOSIS — F41.1 GENERALIZED ANXIETY DISORDER: Primary | ICD-10-CM

## 2025-03-10 RX ORDER — SERTRALINE HYDROCHLORIDE 100 MG/1
250 TABLET, FILM COATED ORAL DAILY
Qty: 225 TABLET | Refills: 0 | Status: SHIPPED | OUTPATIENT
Start: 2025-03-10 | End: 2025-06-08

## 2025-03-10 RX ORDER — HYDROXYZINE HYDROCHLORIDE 10 MG/1
10-20 TABLET, FILM COATED ORAL 3 TIMES DAILY PRN
Qty: 60 TABLET | Refills: 0 | Status: SHIPPED | OUTPATIENT
Start: 2025-03-10

## 2025-03-10 NOTE — PROGRESS NOTES
[facility] (through [facility]) is located [address] [place] [address]. This provider is located at home office, accessing appointment using a secure H5t Video Visit through [employer]. Patient stated they are in a secure environment for this session. The patient's condition being diagnosed/treated is appropriate for telemedicine. The provider identified herself as well as her credentials.  The patient, and/or patients guardian, consent to be seen remotely, and when consent is given they understand that the consent allows for patient identifiable information to be sent to a third party as needed.   They may refuse to be seen remotely at any time. The electronic data is encrypted and password protected, and the patient and/or guardian has been advised of the potential risks to privacy not withstanding such measures.    Mode of Visit: Video  Location of patient: -HOME-  Location of provider: +HOME+  You have chosen to receive care through a telehealth visit.  The patient has signed the video visit consent form.  The visit included audio and video interaction. No technical issues occurred during this visit.    Patient identifiers utilized: Name and date of birth.    Patient verbally confirmed consent for today's encounter:  March 10, 2025  Subjective     Sisi Gale is a 24 y.o. female who presents today for follow up    Chief Complaint:    Chief Complaint   Patient presents with    Anxiety        History of Present Illness:    - Sisi Gale is a 24 y.o. patient presenting for follow up of depression. At the previous visit, no changes were made as patient was stable.  - Got a new job that she is starting next week. 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 They have been giving her extra shifts and  "got 'really mad' at her when she announced she was leaving. Feels like its been a really negative environment.     Current Medications:  Zoloft 250 mg qday  Vraylar 1.5 mg qday  Elavil 75 mg qhs (for headaches)      Side Effects: Denies any issues with fatigue, GI complaints, or fatigue  Sleep: No acute changes  Mood: \"Stressed\"  SI/HI/AVH: Denies  Overall Function: Stable      The following portions of the patient's history were reviewed and updated as appropriate: allergies, current medications, past family history, past medical history, past social history, past surgical history and problem list.        Past Medical History:  Past Medical History:   Diagnosis Date    Allergic     Anxiety      Past Psychiatric History:  Began Treatment:Started treatment in high school  Previous Diagnosis: Anxiety  Previous Psychiatrist: Denies  Therapist:Denies  Admission History:Denies  Medication Trials:   Zoloft, Lexapro, Buspar, Prozac, Wellbutrin (Did not help), Effexor (had been on this since rachelle/senior year), & Auvelity.   Self Harm:Denies  Suicide Attempts: Denies       Developmental History:  Pregnancy Complications: Born at term   Complications: Denies  Illness During Infancy: Denies  Milestones:  Grossly normal per patient     Substance Abuse History:   Types:Denies all, including illicit  Withdrawal Symptoms:Denies  Longest Period Sober:Not Applicable       Social History:  Social History     Socioeconomic History    Marital status: Single   Tobacco Use    Smoking status: Never     Passive exposure: Never    Smokeless tobacco: Never   Vaping Use    Vaping status: Never Used   Substance and Sexual Activity    Alcohol use: No    Drug use: Never    Sexual activity: Not Currently     Partners: Male     Birth control/protection: Condom, Birth control pill     Living Situation: Living in [place] with her boyfriend. [place] up in [place]. 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   Foster Care Hx: Denies  Legal Issues: Denies  Special Education Hx: Denies  Abuse Hx: Denies       Family History:  Family History   Problem Relation Age of Onset    No Known Problems Mother     Colon polyps Father     Diabetes Father     Colon cancer Neg Hx        Past Surgical History:  Past Surgical History:   Procedure Laterality Date    COLONOSCOPY N/A 6/4/2024    Procedure: COLONOSCOPY WITH ANESTHESIA;  Surgeon: Arvind Dupont MD;  Location: [facility] ENDOSCOPY;  Service: Gastroenterology;  Laterality: N/A;  preop; constipation  postop normal   PCP Thalia Avilez    HEMORRHOIDECTOMY      WISDOM TOOTH EXTRACTION         Problem List:  Patient Active Problem List   Diagnosis    Left ureteral stone    Chronic tonsillitis    Antibiotic drug intolerance    Constipation    Cervical myofascial pain syndrome    Chronic migraine without aura, intractable, without status migrainosus       Allergy:   Allergies   Allergen Reactions    Bactrim [Sulfamethoxazole-Trimethoprim] Hives    Biaxin [Clarithromycin] Hives    Cephalosporins Hives    Prednisone Hives    Sulfa Antibiotics Hives        Current Medications:   Current Outpatient Medications   Medication Sig Dispense Refill    Cariprazine HCl (Vraylar) 1.5 MG capsule capsule Take 1 capsule by mouth Daily. 90 capsule 0    sertraline (ZOLOFT) 100 MG tablet Take 2.5 tablets by mouth Daily for 90 days. 225 tablet 0    amitriptyline (ELAVIL) 75 MG tablet Take 1 tablet by mouth every night at bedtime. 30 tablet 3    Azelastine HCl 137 MCG/SPRAY solution SPRAY 2 SPRAYS INTO THE NOSTRILS AS DIRECTED BY PROVIDER 2 TIMES DAILY (Patient taking differently: As Needed.) 30 mL 6    azithromycin (Zithromax Z-Tommy) 250 MG tablet Take 2 tablets by mouth on day 1, then 1 tablet daily on days 2-5 (Patient not taking: Reported on 2/10/2025) 6 tablet 0     butalbital-acetaminophen-caffeine-codeine (FIORICET WITH CODEINE) -92-30 MG per capsule Take 1 capsule by mouth Every 4 (Four) Hours As Needed for Migraine. 30 capsule 0    eletriptan (RELPAX) 20 MG tablet TAKE 1 TABLET BY MOUTH DAILY AS NEEDED FOR MIGRAINE. MAY REPEAT IN 2 HOURS IF NECESSARY 6 tablet 6    fluconazole (DIFLUCAN) 200 MG tablet TAKE 1 TABLET BY MOUTH EVERY OTHER DAY 6 tablet 1    hydrOXYzine (ATARAX) 10 MG tablet Take 1-2 tablets by mouth 3 (Three) Times a Day As Needed (anxiety and/or sleep). 60 tablet 0    linaclotide (LINZESS) 145 MCG capsule capsule Take 1 capsule by mouth Daily. Take on empty stomach, at least 30 minutes before first meal of the day. (Patient not taking: Reported on 2/10/2025) 30 capsule 11    linaclotide (Linzess) 290 MCG capsule capsule Take 1 capsule by mouth Every Morning Before Breakfast. 30 capsule 6    ondansetron ODT (ZOFRAN-ODT) 4 MG disintegrating tablet Place 1 tablet on the tongue Every 6 (Six) Hours As Needed for Nausea or Vomiting. 30 tablet 0    predniSONE (DELTASONE) 10 MG (21) dose pack Use as directed on package (Patient not taking: Reported on 2/10/2025) 21 tablet 0    propranolol (INDERAL) 20 MG tablet TAKE 1 TABLET BY MOUTH 2 (TWO) TIMES A DAY FOR 90 DAYS. 180 tablet 0    Rosy 3-0.03 MG per tablet Take 1 tablet by mouth Daily. 28 tablet 11    tiZANidine (ZANAFLEX) 4 MG tablet Take 0.5-1 tablets by mouth Every 8 (Eight) Hours As Needed.       No current facility-administered medications for this visit.       Review of Symptoms:    Review of Systems   Psychiatric/Behavioral:  Positive for stress. Negative for behavioral problems, sleep disturbance, suicidal ideas and depressed mood. The patient is nervous/anxious.    All other systems reviewed and are negative.      Physical Exam:   Physical Exam  Constitutional:       Appearance: Normal appearance. She is not toxic-appearing.   Neurological:      Mental Status: She is alert.   Psychiatric:         Mood  "and Affect: Mood normal.         Behavior: Behavior normal.         Thought Content: Thought content normal.         Judgment: Judgment normal.         Vitals:  not currently breastfeeding.   There is no height or weight on file to calculate BMI.    Last 3 Blood Pressure Readings:  BP Readings from Last 3 Encounters:   02/10/25 106/76   01/03/25 125/83   11/04/24 103/72       PHQ-9 Score:   PHQ-9 Total Score: (Patient-Rptd) 3    CHIQUIS-7 Score:   Feeling nervous, anxious or on edge: (Patient-Rptd) Several days  Not being able to stop or control worrying: (Patient-Rptd) Several days  Worrying too much about different things: (Patient-Rptd) Several days  Trouble Relaxing: (Patient-Rptd) Several days  Being so restless that it is hard to sit still: (Patient-Rptd) Several days  Feeling afraid as if something awful might happen: (Patient-Rptd) Several days  Becoming easily annoyed or irritable: (Patient-Rptd) Several days  CHIQUIS 7 Total Score: (Patient-Rptd) 7  If you checked any problems, how difficult have these problems made it for you to do your work, take care of things at home, or get along with other people: (Patient-Rptd) Very difficult     Mental Status Exam:   Hygiene:   good  Cooperation:  Cooperative  Eye Contact:  Good  Psychomotor Behavior:  Appropriate  Affect:  Full range  and Appropriate   Mood: euthymic \"Less irritable\"  Hopelessness: Denies  Speech:  Normal  Thought Process:  Goal directed and Linear  Thought Content:  Normal  Suicidal:  None  Homicidal:  None  Hallucinations:  None  Delusion:  None  Memory:  Intact  Orientation:  Grossly intact  Reliability:  good  Insight:  Fair  Judgement:  Fair  Impulse Control:  Fair  Physical/Medical Issues:  Denies       Lab Results:   Lab on 02/10/2025   Component Date Value Ref Range Status    TSH 02/10/2025 1.830  0.270 - 4.200 uIU/mL Final         Assessment & Plan   Diagnoses and all orders for this visit:    1. Generalized anxiety disorder (Primary)    2. Mixed " obsessional thoughts and acts    Other orders  -     hydrOXYzine (ATARAX) 10 MG tablet; Take 1-2 tablets by mouth 3 (Three) Times a Day As Needed (anxiety and/or sleep).  Dispense: 60 tablet; Refill: 0  -     sertraline (ZOLOFT) 100 MG tablet; Take 2.5 tablets by mouth Daily for 90 days.  Dispense: 225 tablet; Refill: 0  -     Cariprazine HCl (Vraylar) 1.5 MG capsule capsule; Take 1 capsule by mouth Daily.  Dispense: 90 capsule; Refill: 0            Visit Diagnoses:    ICD-10-CM ICD-9-CM   1. Generalized anxiety disorder  F41.1 300.02   2. Mixed obsessional thoughts and acts  F42.2 300.3           Formulation:  Sisi Gale is a 24 y.o. patient with previous dx of CHIQUIS and OCD presenting for follow up evaluation and management of anxiety. Patient says that since high school, she has dealt with significant anxiety, but it has worsened in the past year in the context of significant stress at work. Struggles with overthinking/worrying, which she feels makes her more irritable than she should be. Recently diagnosed with OCD, endoses past compulsive cleaning behaviors that have improved with Vraylar.     3/10/2025: Patient is dealing with worsening stress/anxiety in the context of switching jobs. Will trial PRN hydroxyzine    Past Medication Trials: Zoloft, Lexapro, Buspar, Prozac, Wellbutrin (Did not help), Effexor (had been on this since rachelle/senior year), & Auvelity.      Plan:  #CHIQUIS #OCD  - Continue Zoloft to 250 mg qday  - Continue Vralyar at this time, will consider decrease once Zoloft is at a reliable dosage  - Start PRN Hydroxyzine 10-20 mg q8h PRN anxiety  - Discussed Serotonin Syndrome, and informed patient of red flag symptoms to monitor for, particularly since she is also taking Elavil for her headaches at this time.         Risk Assessment for Suicide/Harm To Self/Others: : Based on patient history, demographics and today's interview, Patient is considered to be at low risk for self harm/harm to others.       GOALS:  Short Term Goals: Patient will be compliant with medication, and patient will have no significant medication related side effects.  Patient will be engaged in psychotherapy as indicated.  Patient will report subjective improvement of symptoms.  Long term goals: To stabilize mood and treat/improve subjective symptoms, the patient will stay out of the hospital, the patient will be at an optimal level of functioning, and the patient will take all medications as prescribed.  The patient/guardian verbalized understanding and agreement with goals that were mutually set.      TREATMENT PLAN: Continue supportive psychotherapy efforts and medications as indicated.  Pharmacological and Non-Pharmacological treatment options discussed during today's visit. Patient/Guardian acknowledged and verbally consented with current treatment plan and was educated on the importance of compliance with treatment and follow-up appointments.      MEDICATION ISSUES:  Discussed medication options and treatment plan of prescribed medication as well as the risks, benefits, any black box warnings, and side effects including potential falls, possible impaired driving, and metabolic adversities among others. Patient is agreeable to call the office with any worsening of symptoms or onset of side effects, or if any concerns or questions arise.  The contact information for the office is made available to the patient. Patient is agreeable to call 911 or go to the nearest ER should they begin having any SI/HI, or if any urgent concerns arise. No medication side effects or related complaints today.     MEDS ORDERED DURING VISIT:  New Medications Ordered This Visit   Medications    hydrOXYzine (ATARAX) 10 MG tablet     Sig: Take 1-2 tablets by mouth 3 (Three) Times a Day As Needed (anxiety and/or sleep).     Dispense:  60 tablet     Refill:  0    sertraline (ZOLOFT) 100 MG tablet     Sig: Take 2.5 tablets by mouth Daily for 90 days.      Dispense:  225 tablet     Refill:  0    Cariprazine HCl (Vraylar) 1.5 MG capsule capsule     Sig: Take 1 capsule by mouth Daily.     Dispense:  90 capsule     Refill:  0       MEDS DISCONTINUED DURING VISIT:   Medications Discontinued During This Encounter   Medication Reason    Cariprazine HCl (Vraylar) 1.5 MG capsule capsule Reorder    sertraline (ZOLOFT) 100 MG tablet Reorder        Follow Up Appointment:   6 weeks           This document has been electronically signed by Carson Bhakta MD  March 10, 2025 10:24 EDT

## 2025-03-14 DIAGNOSIS — M54.12 RADICULITIS, CERVICAL: ICD-10-CM

## 2025-03-14 DIAGNOSIS — M62.838 CERVICAL PARASPINAL MUSCLE SPASM: ICD-10-CM

## 2025-03-14 DIAGNOSIS — M54.2 NECK PAIN: ICD-10-CM

## 2025-03-14 NOTE — TELEPHONE ENCOUNTER
Cleo Henson called to request a refill on her medication.      Last office visit : 8/18/2023   Next office visit : Visit date not found     Requested Prescriptions     Pending Prescriptions Disp Refills    tiZANidine (ZANAFLEX) 4 MG tablet [Pharmacy Med Name: TIZANIDINE HCL 4 MG TABLET] 60 tablet 3     Sig: TAKE 1/2 TO 1 TABLET BY MOUTH EVERY 8 HOURS AS NEEDED            Nayana Espinoza MA

## 2025-03-28 RX ORDER — PROPRANOLOL HCL 20 MG
20 TABLET ORAL 2 TIMES DAILY
Qty: 180 TABLET | Refills: 0 | Status: SHIPPED | OUTPATIENT
Start: 2025-03-28 | End: 2025-06-26

## 2025-03-28 NOTE — TELEPHONE ENCOUNTER
Rx Refill Note  Requested Prescriptions     Pending Prescriptions Disp Refills    propranolol (INDERAL) 20 MG tablet [Pharmacy Med Name: PROPRANOLOL 20 MG TABLET] 180 tablet 0     Sig: TAKE 1 TABLET BY MOUTH 2 (TWO) TIMES A DAY FOR 90 DAYS.      Last office visit with prescribing clinician: 1/3/2025   Last telemedicine visit with prescribing clinician: Visit date not found   Next office visit with prescribing clinician: Visit date not found                         Would you like a call back once the refill request has been completed: [] Yes [] No    If the office needs to give you a call back, can they leave a voicemail: [] Yes [] No    Brenda Del Rio RN  03/28/25, 07:45 CDT

## 2025-04-02 DIAGNOSIS — G43.E09 CHRONIC MIGRAINE WITH AURA WITHOUT STATUS MIGRAINOSUS, NOT INTRACTABLE: ICD-10-CM

## 2025-04-02 RX ORDER — BUTALBITAL, ACETAMINOPHEN, CAFFEINE AND CODEINE PHOSPHATE 50; 325; 40; 30 MG/1; MG/1; MG/1; MG/1
1 CAPSULE ORAL EVERY 4 HOURS PRN
Qty: 30 CAPSULE | Refills: 0 | Status: CANCELLED | OUTPATIENT
Start: 2025-04-02

## 2025-04-02 RX ORDER — BUTALBITAL, ACETAMINOPHEN, CAFFEINE AND CODEINE PHOSPHATE 50; 325; 40; 30 MG/1; MG/1; MG/1; MG/1
1 CAPSULE ORAL EVERY 4 HOURS PRN
Qty: 30 CAPSULE | Refills: 0 | OUTPATIENT
Start: 2025-04-02

## 2025-04-02 RX ORDER — HYDROXYZINE HYDROCHLORIDE 10 MG/1
10-20 TABLET, FILM COATED ORAL 3 TIMES DAILY PRN
Qty: 60 TABLET | Refills: 0 | Status: SHIPPED | OUTPATIENT
Start: 2025-04-02

## 2025-04-02 NOTE — TELEPHONE ENCOUNTER
Caller: Sisi Gale    Relationship: Self    Best call back number: [phone]     Requested Prescriptions:   Requested Prescriptions     Pending Prescriptions Disp Refills    butalbital-acetaminophen-caffeine-codeine (FIORICET WITH CODEINE) -46-30 MG per capsule 30 capsule 0     Sig: Take 1 capsule by mouth Every 4 (Four) Hours As Needed for Migraine.        Pharmacy where request should be sent: [facility]ACY #[id number] - [place], [place] - [address] [facility] [phone] [facility] [phone]      Last office visit with prescribing clinician: 1/3/2025   Last telemedicine visit with prescribing clinician: Visit date not found   Next office visit with prescribing clinician: Visit date not found     Additional details provided by patient: OUT, & SHE CAN'T GET INTO HER OTHER DR. [employer] SHE DOESN'T HAVE INSURANCE AT THIS TIME    PLEASE CALL BACK PATIENT IF & WHEN THIS CAN BE SENT    Does the patient have less than a 3 day supply:  [x] Yes  [] No    Would you like a call back once the refill request has been completed: [x] Yes [] No    If the office needs to give you a call back, can they leave a voicemail: [x] Yes [] No    Ashlee Morrow Rep   04/02/25 09:02 CDT

## 2025-04-02 NOTE — TELEPHONE ENCOUNTER
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 Cross, so I was wondering if you’d give me a refill until I get insirance next month?

## 2025-04-03 DIAGNOSIS — G43.E09 CHRONIC MIGRAINE WITH AURA WITHOUT STATUS MIGRAINOSUS, NOT INTRACTABLE: ICD-10-CM

## 2025-04-03 RX ORDER — BUTALBITAL, ACETAMINOPHEN, CAFFEINE AND CODEINE PHOSPHATE 50; 325; 40; 30 MG/1; MG/1; MG/1; MG/1
1 CAPSULE ORAL EVERY 4 HOURS PRN
Qty: 30 CAPSULE | Refills: 0 | Status: SHIPPED | OUTPATIENT
Start: 2025-04-03

## 2025-04-14 RX ORDER — FLUCONAZOLE 200 MG/1
200 TABLET ORAL EVERY OTHER DAY
Qty: 6 TABLET | Refills: 1 | Status: SHIPPED | OUTPATIENT
Start: 2025-04-14

## 2025-04-14 NOTE — TELEPHONE ENCOUNTER
Called, spoke with patient and made her aware rx was sent 03/10/25 for 90 day supply. Patient is going to reach out to the pharmacy.

## 2025-04-22 ENCOUNTER — PATIENT MESSAGE (OUTPATIENT)
Dept: INTERNAL MEDICINE | Facility: CLINIC | Age: 25
End: 2025-04-22
Payer: COMMERCIAL

## 2025-04-22 DIAGNOSIS — F41.9 ANXIETY DISORDER, UNSPECIFIED: ICD-10-CM

## 2025-04-22 RX ORDER — CARIPRAZINE 1.5 MG/1
1.5 CAPSULE, GELATIN COATED ORAL DAILY
Qty: 30 CAPSULE | Refills: 5 | Status: SHIPPED | OUTPATIENT
Start: 2025-04-22

## 2025-04-26 DIAGNOSIS — G43.E09 CHRONIC MIGRAINE WITH AURA WITHOUT STATUS MIGRAINOSUS, NOT INTRACTABLE: ICD-10-CM

## 2025-04-28 RX ORDER — BUTALBITAL, ACETAMINOPHEN, CAFFEINE AND CODEINE PHOSPHATE 50; 325; 40; 30 MG/1; MG/1; MG/1; MG/1
1 CAPSULE ORAL EVERY 4 HOURS PRN
Qty: 30 CAPSULE | Refills: 0 | Status: SHIPPED | OUTPATIENT
Start: 2025-04-28

## 2025-05-19 RX ORDER — FLUCONAZOLE 200 MG/1
200 TABLET ORAL
Qty: 6 TABLET | Refills: 1 | Status: SHIPPED | OUTPATIENT
Start: 2025-05-19

## 2025-05-28 DIAGNOSIS — G43.E09 CHRONIC MIGRAINE WITH AURA WITHOUT STATUS MIGRAINOSUS, NOT INTRACTABLE: ICD-10-CM

## 2025-05-29 RX ORDER — BUTALBITAL, ACETAMINOPHEN, CAFFEINE AND CODEINE PHOSPHATE 50; 325; 40; 30 MG/1; MG/1; MG/1; MG/1
CAPSULE ORAL
Qty: 30 CAPSULE | Refills: 0 | OUTPATIENT
Start: 2025-05-29

## 2025-06-07 DIAGNOSIS — M62.838 CERVICAL PARASPINAL MUSCLE SPASM: ICD-10-CM

## 2025-06-09 RX ORDER — AMITRIPTYLINE HYDROCHLORIDE 75 MG/1
75 TABLET ORAL NIGHTLY
Qty: 90 TABLET | Refills: 3 | OUTPATIENT
Start: 2025-06-09

## 2025-06-12 RX ORDER — FLUCONAZOLE 200 MG/1
200 TABLET ORAL
Qty: 6 TABLET | Refills: 1 | Status: SHIPPED | OUTPATIENT
Start: 2025-06-12

## 2025-06-13 DIAGNOSIS — M62.838 CERVICAL PARASPINAL MUSCLE SPASM: ICD-10-CM

## 2025-06-13 DIAGNOSIS — M54.12 RADICULITIS, CERVICAL: ICD-10-CM

## 2025-06-13 DIAGNOSIS — M54.2 NECK PAIN: ICD-10-CM

## 2025-06-19 ENCOUNTER — OFFICE VISIT (OUTPATIENT)
Dept: INTERNAL MEDICINE | Facility: CLINIC | Age: 25
End: 2025-06-19
Payer: COMMERCIAL

## 2025-06-19 VITALS
SYSTOLIC BLOOD PRESSURE: 115 MMHG | HEIGHT: 64 IN | BODY MASS INDEX: 32.27 KG/M2 | WEIGHT: 189 LBS | HEART RATE: 91 BPM | RESPIRATION RATE: 19 BRPM | OXYGEN SATURATION: 100 % | DIASTOLIC BLOOD PRESSURE: 80 MMHG | TEMPERATURE: 97.8 F

## 2025-06-19 DIAGNOSIS — Z32.01 POSITIVE PREGNANCY TEST: ICD-10-CM

## 2025-06-19 DIAGNOSIS — R00.0 TACHYCARDIA: ICD-10-CM

## 2025-06-19 DIAGNOSIS — F41.1 GENERALIZED ANXIETY DISORDER: Primary | ICD-10-CM

## 2025-06-19 RX ORDER — LABETALOL 100 MG/1
50 TABLET, FILM COATED ORAL 2 TIMES DAILY
Qty: 60 TABLET | Refills: 1 | Status: SHIPPED | OUTPATIENT
Start: 2025-06-19

## 2025-06-19 RX ORDER — SERTRALINE HYDROCHLORIDE 100 MG/1
250 TABLET, FILM COATED ORAL DAILY
Qty: 225 TABLET | Refills: 0 | Status: SHIPPED | OUTPATIENT
Start: 2025-06-19 | End: 2025-09-17

## 2025-06-19 NOTE — PROGRESS NOTES
Answers submitted by the patient for this visit:  Problem not listed (Submitted on 6/17/2025)  Chief Complaint: Other medical problem  Reason for appointment: Pregnancy  abdominal pain: No  anorexia: No  joint pain: No  change in stool: No  chest pain: No  chills: No  nasal congestion: No  cough: No  diaphoresis: No  fatigue: Yes  fever: No  headaches: Yes  joint swelling: No  myalgias: No  nausea: No  neck pain: No  numbness: No  rash: No  sore throat: No  swollen glands: No  dysuria: No  vertigo: No  visual change: No  vomiting: No  weakness: No  Onset: in the past 7 days  Chronicity: new  Frequency: daily          Subjective     Chief Complaint   Patient presents with    Anxiety       History of Present Illness  The patient is a 24-year-old female who presents for evaluation of pregnancy.    She confirmed her pregnancy last Thursday, which was a week ago, and has been conducting daily tests to ensure the result remains positive. She has an appointment with her obstetrician today at 4:00 PM. She experienced spotting for 2 days last month, which she initially mistook for her menstrual period due to her use of contraceptive pills and typically light menstrual flow. She anticipates an ultrasound examination today.    She reports no illness but is feeling fatigued. She also reports breast tenderness and a lack of appetite, although she has been making herself eat. She has discontinued her birth control pills and Fioricet. She is currently on propranolol for heart rate management and amitriptyline 75 mg nightly. She has been taking Vraylar every other day and continues to take Zoloft 250 mg. She is also on a low dose of hydroxyzine.    SOCIAL HISTORY  She does not smoke marijuana. 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        Otherwise complete ROS reviewed and negative except as mentioned in the HPI.    Past Medical History:   Past Medical History:   Diagnosis Date    Allergic     Anxiety     Depression      Past  Surgical History:  Past Surgical History:   Procedure Laterality Date    COLONOSCOPY N/A 6/4/2024    Procedure: COLONOSCOPY WITH ANESTHESIA;  Surgeon: Arvind Dupont MD;  Location: [facility] ENDOSCOPY;  Service: Gastroenterology;  Laterality: N/A;  preop; constipation  postop normal   PCP Thalia Avilez    HEMORRHOIDECTOMY      WISDOM TOOTH EXTRACTION       Social History:  reports that she has never smoked. She has never been exposed to tobacco smoke. She has never used smokeless tobacco. She reports that she does not drink alcohol and does not use drugs.    Family History: family history includes Anxiety disorder in her maternal grandmother and mother; Colon polyps in her father; Dementia in her maternal grandmother; Diabetes in her father; OCD in her mother.       Allergies:  Allergies   Allergen Reactions    Bactrim [Sulfamethoxazole-Trimethoprim] Hives    Biaxin [Clarithromycin] Hives    Cephalosporins Hives    Prednisone Hives    Sulfa Antibiotics Hives     Medications:  Prior to Admission medications    Medication Sig Start Date End Date Taking? Authorizing Provider   amitriptyline (ELAVIL) 75 MG tablet Take 1 tablet by mouth every night at bedtime. 7/26/24   Thalia Avilez APRN   amoxicillin-clavulanate (AUGMENTIN) 875-125 MG per tablet Take 1 tablet by mouth 2 (Two) Times a Day. 4/22/25   Thalia Avilez APRN   Azelastine HCl 137 MCG/SPRAY solution SPRAY 2 SPRAYS INTO THE NOSTRILS AS DIRECTED BY PROVIDER 2 TIMES DAILY  Patient taking differently: As Needed. 2/6/25   Thalia Avilez APRN   azithromycin (Zithromax Z-Tommy) 250 MG tablet Take 2 tablets by mouth on day 1, then 1 tablet daily on days 2-5 12/17/24   Lesley Velasquez APRN   butalbital-acetaminophen-caffeine-codeine (FIORICET WITH CODEINE) -37-30 MG per capsule TAKE 1 CAPSULE BY MOUTH EVERY 4 (FOUR) HOURS AS NEEDED FOR MIGRAINE. 4/28/25   Thalia Avilez APRN   eletriptan (RELPAX) 20 MG tablet TAKE 1 TABLET  "BY MOUTH DAILY AS NEEDED FOR MIGRAINE. MAY REPEAT IN 2 HOURS IF NECESSARY 10/16/24   Thalia Avilez APRN   fluconazole (DIFLUCAN) 200 MG tablet TAKE 1 TABLET BY MOUTH EVERY OTHER DAY 6/12/25   Thalia Avilez APRN   hydrOXYzine (ATARAX) 10 MG tablet TAKE 1-2 TABLETS BY MOUTH 3 (THREE) TIMES A DAY AS NEEDED (ANXIETY AND/OR SLEEP). 4/2/25   Carson Bhakta MD   linaclotide (LINZESS) 145 MCG capsule capsule Take 1 capsule by mouth Daily. Take on empty stomach, at least 30 minutes before first meal of the day.  Patient not taking: Reported on 2/10/2025 5/20/24   Sheryl Franklin APRN   linaclotide (Linzess) 290 MCG capsule capsule Take 1 capsule by mouth Every Morning Before Breakfast. 11/4/24   Thalia Avilez APRN   ondansetron ODT (ZOFRAN-ODT) 4 MG disintegrating tablet Place 1 tablet on the tongue Every 6 (Six) Hours As Needed for Nausea or Vomiting. 11/17/23   Dom Henderson APRN   predniSONE (DELTASONE) 10 MG (21) dose pack Use as directed on package  Patient not taking: Reported on 2/10/2025 12/17/24   Lesley Velasquez APRN   propranolol (INDERAL) 20 MG tablet TAKE 1 TABLET BY MOUTH 2 (TWO) TIMES A DAY FOR 90 DAYS. 3/28/25 6/26/25  Dex Del Rio APRN   sertraline (ZOLOFT) 100 MG tablet Take 2.5 tablets by mouth Daily for 90 days. 3/10/25 6/8/25  Carson Bhakta MD   Rosy 3-0.03 MG per tablet Take 1 tablet by mouth Daily. 12/12/24   Thalia Avilez APRN   tiZANidine (ZANAFLEX) 4 MG tablet Take 0.5-1 tablets by mouth Every 8 (Eight) Hours As Needed.    Provider, MD Mary   Vraylar 1.5 MG capsule capsule TAKE 1 CAPSULE BY MOUTH EVERY DAY 4/22/25   Thalia Avilez APRN       Objective     Vital Signs: /80   Pulse 91   Temp 97.8 °F (36.6 °C)   Resp 19   Ht 162.6 cm (64\")   Wt 85.7 kg (189 lb)   SpO2 100%   BMI 32.44 kg/m²     Physical Exam  Cardiovascular: Heart rate slightly elevated  Physical Exam  Vitals reviewed.   Constitutional:       " Appearance: She is well-developed.   HENT:      Head: Normocephalic and atraumatic.   Eyes:      Pupils: Pupils are equal, round, and reactive to light.   Neck:      Vascular: No JVD.   Cardiovascular:      Rate and Rhythm: Normal rate and regular rhythm.   Pulmonary:      Effort: Pulmonary effort is normal.      Breath sounds: Normal breath sounds.   Abdominal:      General: Bowel sounds are normal.      Palpations: Abdomen is soft.   Musculoskeletal:         General: No deformity.      Cervical back: Normal range of motion and neck supple.   Lymphadenopathy:      Cervical: No cervical adenopathy.   Skin:     General: Skin is warm and dry.   Neurological:      General: No focal deficit present.      Mental Status: She is alert and oriented to person, place, and time.   Psychiatric:         Behavior: Behavior normal.         Thought Content: Thought content normal.         Judgment: Judgment normal.       Results Reviewed:  Glucose   Date Value Ref Range Status   04/03/2024 95 70 - 99 mg/dL Final   11/17/2023 94 65 - 99 mg/dL Final   05/06/2022 83 74 - 109 mg/dL Final     BUN   Date Value Ref Range Status   04/03/2024 8 6 - 20 mg/dL Final   11/17/2023 10 6 - 20 mg/dL Final   05/06/2022 9 6 - 20 mg/dL Final     Creatinine   Date Value Ref Range Status   04/03/2024 0.71 0.57 - 1.00 mg/dL Final   12/05/2023 0.70 0.60 - 1.30 mg/dL Final     Comment:     Serial Number: [id number]:  [id number]   05/06/2022 0.6 0.5 - 0.9 mg/dL Final     Sodium   Date Value Ref Range Status   04/03/2024 139 134 - 144 mmol/L Final   11/17/2023 138 136 - 145 mmol/L Final   05/06/2022 142 136 - 145 mmol/L Final     Potassium   Date Value Ref Range Status   04/03/2024 4.1 3.5 - 5.2 mmol/L Final   11/17/2023 3.8 3.5 - 5.2 mmol/L Final     Comment:     Slight hemolysis detected by analyzer. Result may be falsely elevated.   05/06/2022 4.2 3.5 - 5.0 mmol/L Final     Chloride   Date Value Ref Range Status   04/03/2024 100 96 - 106 mmol/L Final    11/17/2023 103 98 - 107 mmol/L Final   05/06/2022 103 98 - 111 mmol/L Final     CO2   Date Value Ref Range Status   11/17/2023 25.0 22.0 - 29.0 mmol/L Final   05/06/2022 21 (L) 22 - 29 mmol/L Final     Total CO2   Date Value Ref Range Status   04/03/2024 22 20 - 29 mmol/L Final     Calcium   Date Value Ref Range Status   04/03/2024 9.5 8.7 - 10.2 mg/dL Final   11/17/2023 9.0 8.6 - 10.5 mg/dL Final   05/06/2022 10.0 8.6 - 10.0 mg/dL Final     ALT (SGPT)   Date Value Ref Range Status   04/03/2024 17 0 - 32 IU/L Final   11/17/2023 15 1 - 33 U/L Final   05/06/2022 21 5 - 33 U/L Final     AST (SGOT)   Date Value Ref Range Status   04/03/2024 16 0 - 40 IU/L Final   11/17/2023 18 1 - 32 U/L Final   05/06/2022 20 5 - 32 U/L Final     WBC   Date Value Ref Range Status   04/03/2024 6.8 3.4 - 10.8 x10E3/uL Final   05/06/2022 10.5 4.8 - 10.8 K/uL Final     Hematocrit   Date Value Ref Range Status   04/03/2024 41.5 34.0 - 46.6 % Final   11/17/2023 38.4 34.0 - 46.6 % Final   05/06/2022 42.0 37.0 - 47.0 % Final     Platelets   Date Value Ref Range Status   04/03/2024 284 150 - 450 x10E3/uL Final   11/17/2023 297 140 - 450 10*3/mm3 Final   05/06/2022 353 130 - 400 K/uL Final     Triglycerides   Date Value Ref Range Status   12/08/2023 129 0 - 149 mg/dL Final     HDL Cholesterol   Date Value Ref Range Status   12/08/2023 80 >39 mg/dL Final     LDL Chol Calc (NIH)   Date Value Ref Range Status   12/08/2023 93 0 - 99 mg/dL Final     Hemoglobin A1C   Date Value Ref Range Status   12/08/2023 5.7 (H) 4.8 - 5.6 % Final     Comment:              Prediabetes: 5.7 - 6.4           Diabetes: >6.4           Glycemic control for adults with diabetes: <7.0     05/06/2022 5.4 4.0 - 6.0 % Final     Comment:     HbA1c levels >6% are an indication of hyperglycemia during the preceding 2  to 3 months or longer.    HbA1c levels may reach 20% or higher in poorly controlled diabetes.  Therapeutic action is suggested at levels above 8%.    Diabetes  patients with HbA1c levels below 7% meet the goal of the American  Diabetes Association.    HbA1c levels below the established reference range may indicate recent  episodes of hypoglycemia, the presence of Hb variants, or shortened lifetime  of erythrocytes.        Results  Labs   - Pregnancy test: Positive      Assessment / Plan     Assessment/Plan:  Diagnoses and all orders for this visit:    1. Generalized anxiety disorder (Primary)  -     amitriptyline (ELAVIL) 25 MG tablet; Take 1 tablet by mouth Every Night.  Dispense: 30 tablet; Refill: 0  -     sertraline (ZOLOFT) 100 MG tablet; Take 2.5 tablets by mouth Daily for 90 days.  Dispense: 225 tablet; Refill: 0    2. Positive pregnancy test    3. Tachycardia  -     labetalol (NORMODYNE) 100 MG tablet; Take 0.5 tablets by mouth 2 (Two) Times a Day.  Dispense: 60 tablet; Refill: 1      Assessment & Plan  1. Pregnancy.  - Positive pregnancy test confirmed last week.  - Ultrasound scheduled for today to determine gestational age.  - Advised to discontinue propranolol and switch to labetalol 100 mg, to be taken as half a tablet twice daily.  - Informed that regular use of hydroxyzine could potentially reduce milk production due to its antihistamine effect.    2. Medication Management.  - Advised to discontinue propranolol and switch to labetalol 100 mg, to be taken as half a tablet twice daily.  - Discussed potential risks associated with Vraylar during the third trimester, including decreased fetal weight, fetal death, and  developmental delays.  - Instructed to continue taking Vraylar every other day for another week before discontinuing it completely.  - Refill for Zoloft 250 mg provided.    3. Amitriptyline Weaning.  - Advised to gradually reduce amitriptyline dosage from 75 mg to 25 mg over the next 2 weeks.  - Followed by an every-other-night regimen for a week before discontinuation.  - Recommended to use Unisom for sleep.    4. Counseling.  - Reassured  that it is normal to experience a lack of appetite during pregnancy.  - Advised to maintain a healthy diet.  - Discussed potential risks associated with Vraylar during the third trimester.  - Informed that regular use of hydroxyzine could potentially reduce milk production due to its antihistamine effect.      No follow-ups on file. unless patient needs to be seen sooner or acute issues arise.    Code Status: Full    Patient or patient representative verbalized consent for the use of Ambient Listening during the visit with  SEN Perez for chart documentation. 6/19/2025  08:19 CDT  I have discussed the patient results/orders and and plan/recommendation with them at today's visit.      Signed by:    SEN Perez Date: 06/19/25

## 2025-07-11 ENCOUNTER — E-VISIT (OUTPATIENT)
Dept: FAMILY MEDICINE CLINIC | Facility: TELEHEALTH | Age: 25
End: 2025-07-11
Payer: COMMERCIAL

## 2025-07-11 DIAGNOSIS — F41.1 GENERALIZED ANXIETY DISORDER: ICD-10-CM

## 2025-07-11 NOTE — E-VISIT ESCALATED
Date: 2025 17:26:43  Clinician: Mila Miller  Clinician NPI: [id number]  Patient: Sisi Gale  Patient : 2000  Patient Address: [address] [place] [address]  Patient Phone: [phone]  Visit Protocol: URI  Patient Summary:  Sisi is a 24 year old ( : 2000 ) female who initiated a visit for cold, sinus infection, or influenza.     Sisi states the symptoms started 1-2 days ago.   Symptom start date: 2025   The symptoms consist of myalgia,   enlarged lymph nodes, rhinitis, a cough, facial pain or pressure, chills, a headache, anosmia or ageusia, nasal congestion, ear pain, a sore throat, and malaise. Sisi is experiencing mild difficulty breathing with daily activities but can speak   normally in full sentences. Sisi also feels feverish.   Symptom details     Nasal secretions: The color of the mucus is white, green, and yellow.    Cough: Sisi coughs a few times an hour and the cough is more bothersome at night. Phlegm does not come into the throat when coughing. Sisi believes the cough is caused by post-nasal drip.     Sore throat: Sisi reports having moderate throat pain (4-6 on a 10 point pain scale), does not have exudate on the tonsils, and can swallow liquids with mild discomfort. The lymph nodes in the neck are enlarged. The lymph node swelling is not worse on   one side and the swelling has caused changes in speech or hoarseness in the voice. A rash has not appeared on the skin since the sore throat started. Patient reports feeling pain in the front of the neck that sometimes moves to the ear.     Temperature: Current temperature is 100 degrees Fahrenheit.     Facial pain or pressure: The facial pain or pressure feels worse when bending over or leaning forward.     Headache: The headache is severe (7-9 on a 10 point pain scale).      Sisi denies having teeth pain, wheezing, vomiting, nausea, and diarrhea. Sisi also denies having recent facial or sinus  surgery in the past 60 days, experiencing difficulty opening their mouth due to pain or swelling in the jaw muscles, and having   a sinus infection within the past year.   Precipitating events  Within the past week, Sisi has been exposed to someone with strep throat. Sisi has recently been exposed to someone with influenza. Sisi has been in close contact with the following   high risk individuals: children under the age of 5, immunocompromised people, adults 65 or older, people with asthma, heart disease or diabetes, and pregnant women.    Sisi has not received the influenza vaccination.   Pertinent COVID-19 (Coronavirus)   information  Since the symptoms started, Sisi has tested for COVID-19. The result of the test was negative. The negative result was within the last 48 hours.   Sisi has not received a COVID-19 vaccine in the past year.   Pertinent medical history       Sisi has taken an antibiotic medication for similar symptoms in the past month. Antibiotic name as reported by the patient (free text): Amoxicillin   Sisi typically gets a yeast infection when an antibiotic is taken. Sisi has successfully used   Terconazole vaginal suppository and Diflucan to treat previous yeast infections. 2 doses of fluconazole (Diflucan) has typically been needed for symptoms to resolve in the past.  A provider has told Sisi to avoid NSAIDs.   Sisi does not have   diabetes. Sisi denies having immunosuppressive conditions (e.g., chemotherapy, HIV, organ transplant, long-term use of steroids or other immunosuppressive medications, splenectomy). Sisi does not have asthma.   Sisi denies having chronic lung   disease, cystic fibrosis, hypertension, long-term disabilities, mental health conditions, sickle cell disease or thalassemia, stroke or other cardiovascular disease, substance use disorders, or tuberculosis (TB).  Sisi does not smoke or use smokeless   tobacco. Sisi does not vape or use other  e-cigarette products.   Sisi is approximately 8 weeks pregnant and denies breastfeeding.   Weight: 196 lbs (88.9 kg)    MEDICATIONS: sertraline oral, prednisone oral, labetalol oral, butalbital-acetaminophen-caffeine-codeine oral, hydroxyzine HCl oral, Rosy oral, amitriptyline oral, Vraylar oral, linaclotide oral, tizanidine oral, ondansetron oral, doxycycline hyclate   oral, Golytely oral, ALLERGIES: prednisone, sulfasalazine, cefdinir, clarithromycin, sulfamethoxazole-trimethoprim  Clinician Response:  Dear Sisi,  I am sorry you are not feeling well. To determine the most appropriate care for you, I would like you to be seen in person to further discuss your health history and symptoms.  You will not be charged for this visit.   Thank you for trusting us with your care.  For the latest updates on COVID-19 (Coronavirus), please visit the Centers for Disease Control and Prevention (CDC). Also, your state and local health department websites may provide additional guidance   regarding testing and isolation recommendations for your location.   Diagnosis: Refer for additional evaluation  Diagnosis ICD: R69  Additional Clinician Notes:  Due to early pregnancy, it is important for testing. Medications and treatment are only recommended in certain situations during pregnancy and unnecessary medications should be avoided.

## 2025-07-15 DIAGNOSIS — F41.1 GENERALIZED ANXIETY DISORDER: ICD-10-CM

## 2025-07-21 RX ORDER — HYDROXYZINE HYDROCHLORIDE 10 MG/1
10-20 TABLET, FILM COATED ORAL 3 TIMES DAILY PRN
Qty: 60 TABLET | Refills: 0 | Status: SHIPPED | OUTPATIENT
Start: 2025-07-21

## (undated) DEVICE — Device: Brand: DEFENDO AIR/WATER/SUCTION AND BIOPSY VALVE

## (undated) DEVICE — CUFF,BP,DISP,1 TUBE,ADULT,HP: Brand: MEDLINE

## (undated) DEVICE — YANKAUER,BULB TIP WITH VENT: Brand: ARGYLE

## (undated) DEVICE — MASK,OXYGEN,MED CONC,ADLT,7' TUB, UC: Brand: PENDING

## (undated) DEVICE — SENSR O2 OXIMAX FNGR A/ 18IN NONSTR

## (undated) DEVICE — THE CHANNEL CLEANING BRUSH IS A NYLON FLEXI BRUSH ATTACHED TO A FLEXIBLE PLASTIC SHEATH DESIGNED TO SAFELY REMOVE DEBRIS FROM FLEXIBLE ENDOSCOPES.